# Patient Record
Sex: MALE | Race: BLACK OR AFRICAN AMERICAN | Employment: UNEMPLOYED | URBAN - METROPOLITAN AREA
[De-identification: names, ages, dates, MRNs, and addresses within clinical notes are randomized per-mention and may not be internally consistent; named-entity substitution may affect disease eponyms.]

---

## 2018-07-19 ENCOUNTER — HOSPITAL ENCOUNTER (EMERGENCY)
Age: 38
Discharge: HOME | End: 2018-07-19
Attending: EMERGENCY MEDICINE
Payer: COMMERCIAL

## 2018-07-19 VITALS
SYSTOLIC BLOOD PRESSURE: 132 MMHG | TEMPERATURE: 99.3 F | BODY MASS INDEX: 36.69 KG/M2 | OXYGEN SATURATION: 99 % | HEIGHT: 66 IN | DIASTOLIC BLOOD PRESSURE: 67 MMHG | WEIGHT: 228.3 LBS | HEART RATE: 90 BPM | RESPIRATION RATE: 16 BRPM

## 2018-07-19 DIAGNOSIS — K13.0 CELLULITIS OF LIP: Primary | ICD-10-CM

## 2018-07-19 PROCEDURE — 6360000002 HC RX W HCPCS: Performed by: NURSE PRACTITIONER

## 2018-07-19 PROCEDURE — 6370000000 HC RX 637 (ALT 250 FOR IP): Performed by: NURSE PRACTITIONER

## 2018-07-19 PROCEDURE — 96372 THER/PROPH/DIAG INJ SC/IM: CPT

## 2018-07-19 PROCEDURE — 99283 EMERGENCY DEPT VISIT LOW MDM: CPT

## 2018-07-19 RX ORDER — DIPHENHYDRAMINE HYDROCHLORIDE 50 MG/ML
50 INJECTION INTRAMUSCULAR; INTRAVENOUS ONCE
Status: COMPLETED | OUTPATIENT
Start: 2018-07-19 | End: 2018-07-19

## 2018-07-19 RX ORDER — CLINDAMYCIN HYDROCHLORIDE 300 MG/1
300 CAPSULE ORAL 3 TIMES DAILY
Qty: 21 CAPSULE | Refills: 0 | Status: SHIPPED | OUTPATIENT
Start: 2018-07-19 | End: 2018-07-26

## 2018-07-19 RX ORDER — FAMOTIDINE 20 MG/1
20 TABLET, FILM COATED ORAL ONCE
Status: COMPLETED | OUTPATIENT
Start: 2018-07-19 | End: 2018-07-19

## 2018-07-19 RX ORDER — DEXAMETHASONE SODIUM PHOSPHATE 10 MG/ML
10 INJECTION INTRAMUSCULAR; INTRAVENOUS ONCE
Status: COMPLETED | OUTPATIENT
Start: 2018-07-19 | End: 2018-07-19

## 2018-07-19 RX ADMIN — DEXAMETHASONE SODIUM PHOSPHATE 10 MG: 10 INJECTION INTRAMUSCULAR; INTRAVENOUS at 11:05

## 2018-07-19 RX ADMIN — DIPHENHYDRAMINE HYDROCHLORIDE 50 MG: 50 INJECTION, SOLUTION INTRAMUSCULAR; INTRAVENOUS at 11:04

## 2018-07-19 RX ADMIN — FAMOTIDINE 20 MG: 20 TABLET ORAL at 11:02

## 2018-07-19 ASSESSMENT — ENCOUNTER SYMPTOMS
CHEST TIGHTNESS: 0
SORE THROAT: 0
FACIAL SWELLING: 1
CHOKING: 0
NAUSEA: 0
TROUBLE SWALLOWING: 0
COUGH: 0
SHORTNESS OF BREATH: 0
VOMITING: 0

## 2018-07-19 ASSESSMENT — PAIN SCALES - GENERAL: PAINLEVEL_OUTOF10: 9

## 2018-07-19 ASSESSMENT — PAIN DESCRIPTION - LOCATION: LOCATION: FACE

## 2018-07-19 ASSESSMENT — PAIN DESCRIPTION - DESCRIPTORS: DESCRIPTORS: THROBBING;CONSTANT

## 2018-07-19 NOTE — ED NOTES
Patient ambulatory to treatment area. advised by patient that he experienced onset of swelling to upper lip two days ago. Denies any injury to swollen area and claims that the only medication he is taking is percocet. . States that upper lip swelling has decreased slightly since he has been taking benadryl . denies any other specific discomfort other than swelling to upper lip. Upon arrival patient remains alert/oriented . Respirations non labored. Denies any difficulty swallowing. Patient being evaluated by elise dillard cnp and medications being ordered.       Alexx Sykes RN  07/19/18 [id number]

## 2018-07-26 ENCOUNTER — HOSPITAL ENCOUNTER (EMERGENCY)
Age: 38
Discharge: HOME | End: 2018-07-26
Attending: EMERGENCY MEDICINE
Payer: COMMERCIAL

## 2018-07-26 VITALS
WEIGHT: 229.06 LBS | SYSTOLIC BLOOD PRESSURE: 130 MMHG | OXYGEN SATURATION: 100 % | DIASTOLIC BLOOD PRESSURE: 75 MMHG | HEART RATE: 74 BPM | BODY MASS INDEX: 36.81 KG/M2 | TEMPERATURE: 97.9 F | RESPIRATION RATE: 18 BRPM | HEIGHT: 66 IN

## 2018-07-26 DIAGNOSIS — R19.7 DIARRHEA, UNSPECIFIED TYPE: Primary | ICD-10-CM

## 2018-07-26 PROCEDURE — 6360000002 HC RX W HCPCS: Performed by: NURSE PRACTITIONER

## 2018-07-26 PROCEDURE — 99283 EMERGENCY DEPT VISIT LOW MDM: CPT

## 2018-07-26 RX ORDER — ONDANSETRON 4 MG/1
4 TABLET, ORALLY DISINTEGRATING ORAL EVERY 8 HOURS PRN
Qty: 20 TABLET | Refills: 0 | Status: SHIPPED | OUTPATIENT
Start: 2018-07-26 | End: 2018-09-16

## 2018-07-26 RX ORDER — ONDANSETRON 4 MG/1
4 TABLET, ORALLY DISINTEGRATING ORAL ONCE
Status: COMPLETED | OUTPATIENT
Start: 2018-07-26 | End: 2018-07-26

## 2018-07-26 RX ADMIN — ONDANSETRON 4 MG: 4 TABLET, ORALLY DISINTEGRATING ORAL at 09:53

## 2018-07-26 ASSESSMENT — ENCOUNTER SYMPTOMS
SINUS PRESSURE: 0
COUGH: 0
WHEEZING: 0
VOMITING: 0
COLOR CHANGE: 0
CONSTIPATION: 0
SORE THROAT: 0
NAUSEA: 0
RHINORRHEA: 0
DIARRHEA: 1
ABDOMINAL PAIN: 0
SHORTNESS OF BREATH: 0

## 2018-07-26 ASSESSMENT — PAIN DESCRIPTION - PAIN TYPE: TYPE: ACUTE PAIN

## 2018-07-26 ASSESSMENT — PAIN SCALES - GENERAL: PAINLEVEL_OUTOF10: 3

## 2018-07-26 NOTE — ED PROVIDER NOTES
The patient was seen and examined by me in conjunction with the mid-level provider. I agree with his/her assessment and treatment plan. The patient was unable to provide a stool specimen. Our intention was to test for C. difficile.      Dustin Terry MD  07/26/18 7191

## 2018-09-16 ENCOUNTER — HOSPITAL ENCOUNTER (EMERGENCY)
Age: 38
Discharge: HOME | End: 2018-09-16
Attending: EMERGENCY MEDICINE
Payer: COMMERCIAL

## 2018-09-16 ENCOUNTER — APPOINTMENT (OUTPATIENT)
Dept: GENERAL RADIOLOGY | Age: 38
End: 2018-09-16
Payer: COMMERCIAL

## 2018-09-16 VITALS
OXYGEN SATURATION: 100 % | WEIGHT: 228 LBS | HEIGHT: 66 IN | TEMPERATURE: 98.2 F | SYSTOLIC BLOOD PRESSURE: 134 MMHG | DIASTOLIC BLOOD PRESSURE: 63 MMHG | RESPIRATION RATE: 14 BRPM | HEART RATE: 58 BPM | BODY MASS INDEX: 36.64 KG/M2

## 2018-09-16 DIAGNOSIS — M25.572 CHRONIC PAIN OF LEFT ANKLE: Primary | ICD-10-CM

## 2018-09-16 DIAGNOSIS — G89.29 CHRONIC PAIN OF LEFT ANKLE: Primary | ICD-10-CM

## 2018-09-16 PROCEDURE — 99283 EMERGENCY DEPT VISIT LOW MDM: CPT

## 2018-09-16 PROCEDURE — 73610 X-RAY EXAM OF ANKLE: CPT

## 2018-09-16 ASSESSMENT — ENCOUNTER SYMPTOMS
ABDOMINAL PAIN: 0
SORE THROAT: 0
COUGH: 0
DIARRHEA: 0
VOMITING: 0
COLOR CHANGE: 0
WHEEZING: 0
CONSTIPATION: 0
SHORTNESS OF BREATH: 0
RHINORRHEA: 0
SINUS PRESSURE: 0
NAUSEA: 0

## 2018-09-16 ASSESSMENT — PAIN SCALES - GENERAL: PAINLEVEL_OUTOF10: 8

## 2018-09-16 NOTE — ED PROVIDER NOTES
Systems   Constitutional: Negative for chills, fever and unexpected weight change. HENT: Negative for congestion, rhinorrhea, sinus pressure and sore throat. Respiratory: Negative for cough, shortness of breath and wheezing. Cardiovascular: Negative for chest pain and palpitations. Gastrointestinal: Negative for abdominal pain, constipation, diarrhea, nausea and vomiting. Genitourinary: Negative for dysuria and hematuria. Musculoskeletal: Negative for arthralgias and myalgias. Skin: Negative for color change and rash. Neurological: Negative for dizziness, weakness and headaches. Hematological: Negative for adenopathy. Except as noted above the remainder of the review of systems was reviewed and negative. PHYSICAL EXAM    (up to 7 for level 4, 8 or more for level 5)     ED Triage Vitals [09/16/18 1006]   BP Temp Temp src Pulse Resp SpO2 Height Weight   134/63 98.2 °F (36.8 °C) -- 58 14 100 % 5' 6\" (1.676 m) 228 lb (103.4 kg)       Physical Exam   Constitutional: He is oriented to person, place, and time. He appears well-developed and well-nourished. HENT:   Head: Normocephalic and atraumatic. Mouth/Throat: Oropharynx is clear and moist.   Eyes: Pupils are equal, round, and reactive to light. Conjunctivae are normal.   Neck: Normal range of motion. Neck supple. Cardiovascular: Normal rate and regular rhythm. Pulmonary/Chest: Effort normal and breath sounds normal. No stridor. No respiratory distress. Abdominal: Soft. Bowel sounds are normal.   Musculoskeletal: Normal range of motion. Lymphadenopathy:     He has no cervical adenopathy. Neurological: He is alert and oriented to person, place, and time. Skin: Skin is warm and dry. No rash noted. Psychiatric: He has a normal mood and affect. Vitals reviewed.       RADIOLOGY:   Non-plain film images such as CT, Ultrasound and MRI are read by the radiologist. Plain radiographic images are visualized and preliminarily DEPARTMENT COURSE and DIFFERENTIAL DIAGNOSIS/MDM:   Vitals:    Vitals:    09/16/18 1006   BP: 134/63   Pulse: 58   Resp: 14   Temp: 98.2 °F (36.8 °C)   SpO2: 100%   Weight: 228 lb (103.4 kg)   Height: 5' 6\" (1.676 m)       Medical Decision Making: the patient was given 3 days off at work. He was made aware of the findings on xray. He will call Dr Enrique Farfan office in the morning for follow up care. FINAL IMPRESSION      1.  Chronic pain of left ankle          DISPOSITION/PLAN   DISPOSITION        PATIENT REFERRED TO:   Vidhya Monterroso MD  . [place] [address]  [phone]    Call in 2 days      [facility]  [address] [facility]  [phone]    If symptoms worsen      DISCHARGE MEDICATIONS:     Discharge Medication List as of 9/16/2018 10:40 AM              (Please note that portions of this note were completed with a voice recognition program.  Efforts were made to edit the dictations but occasionally words are mis-transcribed.)    [address] [place] [address] NP, APRN - [place]  Certified Nurse Practitioner          ALEXA Warner - STALIN  09/16/18 9523

## 2019-03-10 ENCOUNTER — HOSPITAL ENCOUNTER (EMERGENCY)
Age: 39
Discharge: HOME | End: 2019-03-10
Attending: EMERGENCY MEDICINE
Payer: COMMERCIAL

## 2019-03-10 ENCOUNTER — APPOINTMENT (OUTPATIENT)
Dept: GENERAL RADIOLOGY | Age: 39
End: 2019-03-10
Payer: COMMERCIAL

## 2019-03-10 VITALS
OXYGEN SATURATION: 100 % | SYSTOLIC BLOOD PRESSURE: 140 MMHG | DIASTOLIC BLOOD PRESSURE: 68 MMHG | TEMPERATURE: 98.1 F | HEIGHT: 66 IN | BODY MASS INDEX: 35.79 KG/M2 | WEIGHT: 222.7 LBS | HEART RATE: 61 BPM | RESPIRATION RATE: 16 BRPM

## 2019-03-10 DIAGNOSIS — M25.572 CHRONIC PAIN OF LEFT ANKLE: Primary | ICD-10-CM

## 2019-03-10 DIAGNOSIS — S93.402A SPRAIN OF LEFT ANKLE, UNSPECIFIED LIGAMENT, INITIAL ENCOUNTER: ICD-10-CM

## 2019-03-10 DIAGNOSIS — G89.29 CHRONIC PAIN OF LEFT ANKLE: Primary | ICD-10-CM

## 2019-03-10 PROCEDURE — 99283 EMERGENCY DEPT VISIT LOW MDM: CPT

## 2019-03-10 PROCEDURE — 73610 X-RAY EXAM OF ANKLE: CPT

## 2019-03-10 PROCEDURE — 73630 X-RAY EXAM OF FOOT: CPT

## 2019-03-10 ASSESSMENT — PAIN DESCRIPTION - LOCATION: LOCATION: ANKLE

## 2019-03-10 ASSESSMENT — PAIN DESCRIPTION - DESCRIPTORS: DESCRIPTORS: CONSTANT;SHARP;STABBING

## 2019-03-10 ASSESSMENT — PAIN DESCRIPTION - FREQUENCY: FREQUENCY: CONTINUOUS

## 2019-03-10 ASSESSMENT — PAIN SCALES - GENERAL: PAINLEVEL_OUTOF10: 9

## 2019-03-10 ASSESSMENT — PAIN DESCRIPTION - ORIENTATION: ORIENTATION: LEFT

## 2020-07-02 ENCOUNTER — TELEPHONE (OUTPATIENT)
Dept: ORTHOPEDIC SURGERY | Age: 40
End: 2020-07-02

## 2020-07-02 NOTE — TELEPHONE ENCOUNTER
[facility] office calling to see if patient needs clearance for upcoming surgery. MA/Surgery Scheduler, Denis Shelton said clearance is needed. [facility] stated an appointment will be scheduled for patient.

## 2020-07-15 ENCOUNTER — HOSPITAL ENCOUNTER (OUTPATIENT)
Age: 40
Setting detail: SPECIMEN
Discharge: HOME | End: 2020-07-15
Payer: COMMERCIAL

## 2020-07-15 ENCOUNTER — OFFICE VISIT (OUTPATIENT)
Dept: ORTHOPEDIC SURGERY | Age: 40
End: 2020-07-15
Payer: COMMERCIAL

## 2020-07-15 VITALS
DIASTOLIC BLOOD PRESSURE: 89 MMHG | HEIGHT: 66 IN | SYSTOLIC BLOOD PRESSURE: 132 MMHG | WEIGHT: 222 LBS | BODY MASS INDEX: 35.68 KG/M2 | HEART RATE: 98 BPM

## 2020-07-15 LAB
ABSOLUTE EOS #: 0.05 K/UL (ref 0–0.44)
ABSOLUTE IMMATURE GRANULOCYTE: <0.03 K/UL (ref 0–0.3)
ABSOLUTE LYMPH #: 1.48 K/UL (ref 1.1–3.7)
ABSOLUTE MONO #: 0.21 K/UL (ref 0.1–1.2)
ALBUMIN SERPL-MCNC: 4.6 G/DL (ref 3.5–5.2)
ALBUMIN/GLOBULIN RATIO: 1.5 (ref 1–2.5)
ALP BLD-CCNC: 61 U/L (ref 40–129)
ALT SERPL-CCNC: 26 U/L (ref 5–41)
ANION GAP SERPL CALCULATED.3IONS-SCNC: 15 MMOL/L (ref 9–17)
AST SERPL-CCNC: 23 U/L
BASOPHILS # BLD: 1 % (ref 0–2)
BASOPHILS ABSOLUTE: 0.05 K/UL (ref 0–0.2)
BILIRUB SERPL-MCNC: 0.37 MG/DL (ref 0.3–1.2)
BUN BLDV-MCNC: 10 MG/DL (ref 6–20)
BUN/CREAT BLD: ABNORMAL (ref 9–20)
C-REACTIVE PROTEIN: 1.2 MG/L (ref 0–5)
CALCIUM SERPL-MCNC: 9.4 MG/DL (ref 8.6–10.4)
CHLORIDE BLD-SCNC: 100 MMOL/L (ref 98–107)
CO2: 24 MMOL/L (ref 20–31)
CREAT SERPL-MCNC: 0.9 MG/DL (ref 0.7–1.2)
DIFFERENTIAL TYPE: NORMAL
EOSINOPHILS RELATIVE PERCENT: 1 % (ref 1–4)
GFR AFRICAN AMERICAN: >60 ML/MIN
GFR NON-AFRICAN AMERICAN: >60 ML/MIN
GFR SERPL CREATININE-BSD FRML MDRD: ABNORMAL ML/MIN/{1.73_M2}
GFR SERPL CREATININE-BSD FRML MDRD: ABNORMAL ML/MIN/{1.73_M2}
GLUCOSE BLD-MCNC: 134 MG/DL (ref 70–99)
HCT VFR BLD CALC: 47.2 % (ref 40.7–50.3)
HEMOGLOBIN: 14.5 G/DL (ref 13–17)
IMMATURE GRANULOCYTES: 0 %
LYMPHOCYTES # BLD: 33 % (ref 24–43)
MCH RBC QN AUTO: 26.3 PG (ref 25.2–33.5)
MCHC RBC AUTO-ENTMCNC: 30.7 G/DL (ref 28.4–34.8)
MCV RBC AUTO: 85.7 FL (ref 82.6–102.9)
MONOCYTES # BLD: 5 % (ref 3–12)
NRBC AUTOMATED: 0 PER 100 WBC
PDW BLD-RTO: 13.2 % (ref 11.8–14.4)
PLATELET # BLD: 300 K/UL (ref 138–453)
PLATELET ESTIMATE: NORMAL
PMV BLD AUTO: 11.6 FL (ref 8.1–13.5)
POTASSIUM SERPL-SCNC: 3.9 MMOL/L (ref 3.7–5.3)
PREALBUMIN: 26.8 MG/DL (ref 20–40)
PTH INTACT: 29.03 PG/ML (ref 15–65)
RBC # BLD: 5.51 M/UL (ref 4.21–5.77)
RBC # BLD: NORMAL 10*6/UL
SEDIMENTATION RATE, ERYTHROCYTE: 6 MM (ref 0–15)
SEG NEUTROPHILS: 60 % (ref 36–65)
SEGMENTED NEUTROPHILS ABSOLUTE COUNT: 2.68 K/UL (ref 1.5–8.1)
SODIUM BLD-SCNC: 139 MMOL/L (ref 135–144)
TOTAL PROTEIN: 7.6 G/DL (ref 6.4–8.3)
TSH SERPL DL<=0.05 MIU/L-ACNC: 1.63 MIU/L (ref 0.3–5)
VITAMIN D 25-HYDROXY: 17.8 NG/ML (ref 30–100)
WBC # BLD: 4.5 K/UL (ref 3.5–11.3)
WBC # BLD: NORMAL 10*3/UL

## 2020-07-15 PROCEDURE — 99204 OFFICE O/P NEW MOD 45 MIN: CPT | Performed by: ORTHOPAEDIC SURGERY
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
## 2020-07-15 NOTE — LETTER
Dr. Rai [address] [facility]  [phone]        7/15/2020     Patient: Jorge Phillips  YOB: 1980    Dear Dontae Perdomo MD,    I had the pleasure of seeing one of your patients, Jorge Phillips, recently in the office. Below are the relevant portions of my assessment and plan of care. ASSESSMENT AND PLAN:  He has left posttraumatic tibiotalar arthritis (status post surgery for ankle and syndesmosis fusion in April 2019 by Dr. Raquel East) with retained hardware, and questionable nonunion of his tibiotalar joint, along with valgus tilt of his ankle/hindfoot. Notably, he has no relevant past medical history. I had a long discussion today with the patient about the likely diagnosis and its natural history, physical exam and imaging findings, as well as treatment options in detail. We discussed rest/activity modification, swelling control, NSAIDs/Acetaminophen/topical anesthetics, orthotics/shoewear modification, bracing/immobilization, injections, and physical therapy. Surgically, we discussed revision tibiotalar arthrodesis versus hindfoot fusion with bone grafting, with removal of hardware, with soft tissue balancing as needed. We also discussed the possibility of staging the surgical treatment, beginning with a removal of hardware, and then returning to the operating room several weeks later for the fusion as above, depending on the results of his infectious lab work as below. At this point, as he does report that he is having increasing difficulty with ambulation, he does wish to proceed with surgery, and after our discussion of the anticipated postoperative course he does state he would like to proceed in September/October. I do think he would most likely benefit postoperatively from a bone stimulator as well. The patient wishes to proceed with the recommendations as above. Orders/referrals were placed as below at today's visit. The following labs were ordered to rule out a reversible metabolic cause of a nonunion: 25-OH vitamin D, CMP, TSH, PTH, and Prealbumin. The following labs were ordered to rule out an infection: CBC with differential, CRP, and ESR. All questions were answered and the patient agrees with the above plan. The patient will return to clinic in 1 month with bilateral Lorriane Mano view x-rays. At his next visit, he will bring with him a CD with his CT scan (was obtained in May 2020), and a copy of his most recent operative note. Depending on his infectious labs, if there is any suspicion for infection, then I may consider recommending staging his removal of hardware, in order to allow 14-day cultures to mature. Depending on the appearance of his CT scan (which is unable to be viewed at this visit), we will decide between a tibiotalar and a hindfoot fusion. Thank you for allowing me to participate in the care of this patient. I look forward to serving you and your patients again in the future. Please don't hesitate to contact me at my mobile number .         Demetris Mojica MD  Orthopedic Surgery

## 2020-07-20 ENCOUNTER — TELEPHONE (OUTPATIENT)
Dept: ORTHOPEDIC SURGERY | Age: 40
End: 2020-07-20

## 2020-07-24 ENCOUNTER — TELEPHONE (OUTPATIENT)
Dept: ORTHOPEDIC SURGERY | Age: 40
End: 2020-07-24

## 2020-08-05 ENCOUNTER — OFFICE VISIT (OUTPATIENT)
Dept: ORTHOPEDIC SURGERY | Age: 40
End: 2020-08-05
Payer: COMMERCIAL

## 2020-08-05 VITALS
BODY MASS INDEX: 38.45 KG/M2 | DIASTOLIC BLOOD PRESSURE: 84 MMHG | HEART RATE: 63 BPM | SYSTOLIC BLOOD PRESSURE: 131 MMHG | HEIGHT: 67 IN | TEMPERATURE: 97.6 F | WEIGHT: 245 LBS

## 2020-08-05 PROCEDURE — 99213 OFFICE O/P EST LOW 20 MIN: CPT | Performed by: ORTHOPAEDIC SURGERY

## 2020-08-05 RX ORDER — ERGOCALCIFEROL (VITAMIN D2) 1250 MCG
50000 CAPSULE ORAL WEEKLY
Qty: 8 CAPSULE | Refills: 0 | Status: SHIPPED | OUTPATIENT
Start: 2020-08-05 | End: 2020-10-05
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
## 2020-08-17 ENCOUNTER — OFFICE VISIT (OUTPATIENT)
Dept: FAMILY MEDICINE CLINIC | Age: 40
End: 2020-08-17
Payer: COMMERCIAL

## 2020-08-17 VITALS
TEMPERATURE: 99.1 F | HEART RATE: 72 BPM | SYSTOLIC BLOOD PRESSURE: 130 MMHG | DIASTOLIC BLOOD PRESSURE: 82 MMHG | OXYGEN SATURATION: 96 % | WEIGHT: 244 LBS | HEIGHT: 66 IN | BODY MASS INDEX: 39.21 KG/M2

## 2020-08-17 LAB — HBA1C MFR BLD: 5 %

## 2020-08-17 PROCEDURE — 99203 OFFICE O/P NEW LOW 30 MIN: CPT | Performed by: NURSE PRACTITIONER

## 2020-08-17 PROCEDURE — 83036 HEMOGLOBIN GLYCOSYLATED A1C: CPT | Performed by: NURSE PRACTITIONER

## 2020-08-17 ASSESSMENT — ENCOUNTER SYMPTOMS
NAUSEA: 0
COUGH: 0
CHEST TIGHTNESS: 0
ABDOMINAL DISTENTION: 0
VOMITING: 0
CONSTIPATION: 0
ABDOMINAL PAIN: 0
SHORTNESS OF BREATH: 0
SORE THROAT: 0
DIARRHEA: 0
BACK PAIN: 0
RHINORRHEA: 0

## 2020-08-17 ASSESSMENT — PATIENT HEALTH QUESTIONNAIRE - PHQ9
1. LITTLE INTEREST OR PLEASURE IN DOING THINGS: 0
SUM OF ALL RESPONSES TO PHQ9 QUESTIONS 1 & 2: 0
2. FEELING DOWN, DEPRESSED OR HOPELESS: 0
SUM OF ALL RESPONSES TO PHQ QUESTIONS 1-9: 0
SUM OF ALL RESPONSES TO PHQ QUESTIONS 1-9: 0
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
## 2020-08-17 NOTE — LETTER
Sridhar Lamer, APRN-CNP  [address] [place] [address]  [address] [address], [address]  [facility] [address]  Dept: [phone]  Dept Fax: [phone]                  Date: 2020     Provider: Dr Joni Rosario MD    Patient: Leslye Mast    : 1980    Procedure: Revision tibiotalar arthrodesis versus hindfoot fusion with bone grafting, with removal of hardware, with soft tissue balancing as needed. Patient will be low risk for cardiopulmonary complications. Patient is cleared for proposed surgery. Any questions please feel free to call at anytime.     Thank you,        DESIREE Silvestre CNP

## 2020-08-17 NOTE — PROGRESS NOTES
Visit Information    Have you changed or started any medications since your last visit including any over-the-counter medicines, vitamins, or herbal medicines? no   Are you having any side effects from any of your medications? -  no  Have you stopped taking any of your medications? Is so, why? -  no    Have you seen any other physician or provider since your last visit? Yes - Records Requested  Have you had any other diagnostic tests since your last visit? Yes - Records Obtained  Have you been seen in the emergency room and/or had an admission to a hospital since we last saw you? No  Have you had your routine dental cleaning in the past 6 months? no    Have you activated your [employer] account? If not, what are your barriers?  Yes     Patient Care Team:  Claire Aguirre MD as Consulting Physician (Orthopedic Surgery)    Medical History Review  Past Medical, Family, and Social History reviewed and does not contribute to the patient presenting condition    Health Maintenance   Topic Date Due    DTaP/Tdap/Td vaccine (1 - Tdap) 09/14/2000    HIV screen  08/17/2021 (Originally 9/14/1996)    Flu vaccine (1) 09/01/2020    Hepatitis A vaccine  Aged Out    Hepatitis B vaccine  Aged Out    Hib vaccine  Aged Out    Meningococcal (ACWY) vaccine  Aged Out    Pneumococcal 0-64 years Vaccine  Aged Out    Varicella vaccine  Discontinued

## 2020-08-18 ENCOUNTER — HOSPITAL ENCOUNTER (OUTPATIENT)
Dept: CT IMAGING | Age: 40
Discharge: HOME | End: 2020-08-20
Payer: COMMERCIAL

## 2020-08-18 PROCEDURE — 73700 CT LOWER EXTREMITY W/O DYE: CPT

## 2020-08-19 ENCOUNTER — OFFICE VISIT (OUTPATIENT)
Dept: ORTHOPEDIC SURGERY | Age: 40
End: 2020-08-19
Payer: COMMERCIAL

## 2020-08-19 VITALS
WEIGHT: 244 LBS | HEIGHT: 66 IN | HEART RATE: 59 BPM | SYSTOLIC BLOOD PRESSURE: 139 MMHG | DIASTOLIC BLOOD PRESSURE: 83 MMHG | BODY MASS INDEX: 39.21 KG/M2

## 2020-08-19 PROCEDURE — 99213 OFFICE O/P EST LOW 20 MIN: CPT | Performed by: ORTHOPAEDIC SURGERY

## 2020-08-19 RX ORDER — ACETAMINOPHEN 160 MG
1 TABLET,DISINTEGRATING ORAL DAILY
COMMUNITY
End: 2021-06-01 | Stop reason: ALTCHOICE

## 2020-08-19 NOTE — PROGRESS NOTES
time, and place  Cardio:  well perfused extremities  Resp:  normal respiratory effort  Skin:  no cyanosis  Hem/lymph:  no lymphedema  Neuro:  sensation to light touch grossly intact throughout all nerve distributions in the foot except diminished on the medial and dorsal aspect of the left foot  Musculoskeletal:    RLE:  Alignment:  Heel neutral   Vascular: Toes warm and well perfused, compartments soft/compressible. No significant swelling of foot. Skin: Intact without rash/lesions/AV malformations. Strength: Able to fire/perform the following with appropriate strength:    [x]  Tib Ant:     [x]  Gastroc-Soleus:         [x]  Inversion:    [x]  Eversion:         [x]  FHL:     [x]  EHL:      Motion:  Normal for the following joints:    [x]  Ankle:     [x]  Subtalar:        [x]  1st MTP:      []  1st TMT:            Tenderness to Palpation:    Tenderness to palpation: None      LLE:  Alignment:  Heel valgus, asymmetric to contralateral  Vascular: Toes warm and well perfused, compartments soft/compressible. No significant swelling of foot. Skin: Intact without rash/lesions/AV malformations. Healed incision at the lateral aspect of the ankle/hindfoot. Healed traumatic wound to the anteromedial aspect of the ankle. Strength: Able to fire/perform the following with appropriate strength:    [x]  Tib Ant:     [x]  Gastroc-Soleus:         [x]  Inversion:    [x]  Eversion:         [x]  FHL:     [x]  EHL:      Motion:  Normal for the following joints:    []  Ankle: No gross motion    []  Subtalar: Diminished      [x]  1st MTP:      []  1st TMT:            Tenderness to Palpation:    Tenderness to palpation:  anterior ankle joint line and diffusely throughout the lateral hindfoot      RADIOLOGY:   8/19/2020 Prior images reviewed for reference.  CT images and radiology report reviewed, as below:    Chronic postsurgical and posttraumatic change to the foot and ankle as above    with orthopedic hardware in place.  No evidence for hardware complication.         Minimal bony fusion of tibiotalar joint estimated at 10% or less with severe    degenerative changes to the unfused portions of the joint.         Mild degenerative changes to the subtalar joint.                 FINDINGS:  One weightbearing view Elkinsjaelyn Bernabee) of the bilateral ankle was obtained in the office today and reviewed, revealing no acute fracture, dislocation, or radioopaque foreign body/tumor. Asymmetric hindfoot valgus on the left. IMPRESSION:  No acute fracture/dislocation. Alignment as above. Electronically signed by Lashonda Haas MD         FINDINGS:  Three weightbearing views (AP, Mortise, and Lateral) of the left ankle and three weightbearing views (AP, Oblique, Lateral) of the left foot were obtained in the office today and reviewed, revealing no acute fracture, dislocation, or radioopaque foreign body/tumor. Degenerative changes of tibiotalar joint with joint narrowing, sclerosis, and osteophytes. Retained hardware noted across the tibiotalar joint and across the syndesmosis, with questionable amount of healing at the tibiotalar joint. IMPRESSION:  No acute fracture/dislocation. Hardware traversing the tibiotalar joint and syndesmosis as above.     Electronically signed by Lashonda Haas MD      LABS:   Lab Results   Component Value Date     07/15/2020    K 3.9 07/15/2020     07/15/2020    CO2 24 07/15/2020    BUN 10 07/15/2020    CREATININE 0.90 07/15/2020    GLUCOSE 134 (H) 07/15/2020    CALCIUM 9.4 07/15/2020    PROT 7.6 07/15/2020    LABALBU 4.6 07/15/2020    BILITOT 0.37 07/15/2020    ALKPHOS 61 07/15/2020    AST 23 07/15/2020    ALT 26 07/15/2020    LABGLOM >60 07/15/2020    GFRAA >60 07/15/2020     Lab Results   Component Value Date    TSH 1.63 07/15/2020     Lab Results   Component Value Date    CALCIUM 9.4 07/15/2020     Lab Results   Component Value Date    LABALBU 4.6 07/15/2020      Lab Results   Component Value Date VITD25 17.8 (L) 07/15/2020     Prealbumin 26.8    LABS:    Lab Results   Component Value Date    WBC 4.5 07/15/2020     Lab Results   Component Value Date    CRP 1.2 07/15/2020     Lab Results   Component Value Date    SEDRATE 6 07/15/2020       ASSESSMENT AND PLAN:  Chad Camacho was seen today for Ankle Pain (left ankle)  The primary encounter diagnosis was Retained orthopedic hardware. 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 Body mass index is 39.38 kg/m². He has left posttraumatic tibiotalar arthritis (status post surgery for ankle and syndesmosis fusion in April 2019 by Dr. 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 His infectious labs as above are all within normal limits, and the only abnormal lab from a nonunion laboratory work-up as above was low vitamin D (for which I prescribed him vitamin D supplementation). Notably, he has no relevant past medical history, other than vitamin D deficiency. I had another discussion today with the patient about the likely diagnosis and its natural history, physical exam and imaging findings, as well as treatment options in detail. We discussed rest/activity modification, swelling control, NSAIDs/Acetaminophen/topical anesthetics, orthotics/shoewear modification, bracing/immobilization, injections, and physical therapy. Surgically, we discussed staged procedures. For the first stage, I will remove all of his hardware, with possible irrigation debridement, and take deep cultures. If his cultures return as negative after 14 days, we will plan to return to the operating room for a revision tibiotalar arthrodesis with bone grafting (allograft and possible proximal tibial bone graft), possible VON, and possible calcaneal osteotomy.   He does wish to proceed with surgery as planned in September and October. Postoperatively, he will use a bone stimulator (he has obtained this), continuous vitamin D supplementation, and I will most likely keep him nonweightbearing for 3 months. The patient wishes to proceed with the recommendations as above. Orders/referrals were placed as below at today's visit. All questions were answered and the patient agrees with the above plan. The patient will return to clinic preoperatively with repeat left foot and ankle x-rays. At his next visit, he will bring a copy of his most recent operative note for preoperative planning in order to remove the hardware. No follow-ups on file. No orders of the defined types were placed in this encounter. No orders of the defined types were placed in this encounter. Susanna Aguilar MD  Orthopedic Surgery, Foot and Ankle        Please excuse any typos/errors, as this note was created with the assistance of voice recognition software. While intending to generate a document that actually reflects the content of the visit, the document can still have some errors including those of syntax and sound-a-like substitutions which may escape proof reading. In such instances, actual meaning can be extrapolated by context.

## 2020-09-04 ENCOUNTER — TELEPHONE (OUTPATIENT)
Dept: ORTHOPEDIC SURGERY | Age: 40
End: 2020-09-04

## 2020-09-11 ENCOUNTER — HOSPITAL ENCOUNTER (OUTPATIENT)
Dept: PREADMISSION TESTING | Age: 40
Discharge: HOME | End: 2020-09-15
Payer: COMMERCIAL

## 2020-09-11 VITALS
TEMPERATURE: 97.6 F | BODY MASS INDEX: 40.04 KG/M2 | HEART RATE: 70 BPM | HEIGHT: 66 IN | WEIGHT: 249.12 LBS | OXYGEN SATURATION: 100 % | DIASTOLIC BLOOD PRESSURE: 70 MMHG | RESPIRATION RATE: 16 BRPM | SYSTOLIC BLOOD PRESSURE: 132 MMHG

## 2020-09-11 LAB
ANION GAP SERPL CALCULATED.3IONS-SCNC: 8 MMOL/L (ref 9–17)
BUN BLDV-MCNC: 12 MG/DL (ref 6–20)
BUN/CREAT BLD: 13 (ref 9–20)
CALCIUM SERPL-MCNC: 8.9 MG/DL (ref 8.6–10.4)
CHLORIDE BLD-SCNC: 104 MMOL/L (ref 98–107)
CO2: 28 MMOL/L (ref 20–31)
CREAT SERPL-MCNC: 0.95 MG/DL (ref 0.7–1.2)
GFR AFRICAN AMERICAN: >60 ML/MIN
GFR NON-AFRICAN AMERICAN: >60 ML/MIN
GFR SERPL CREATININE-BSD FRML MDRD: ABNORMAL ML/MIN/{1.73_M2}
GFR SERPL CREATININE-BSD FRML MDRD: ABNORMAL ML/MIN/{1.73_M2}
GLUCOSE BLD-MCNC: 98 MG/DL (ref 70–99)
HCT VFR BLD CALC: 44.1 % (ref 40.7–50.3)
HEMOGLOBIN: 13.6 G/DL (ref 13–17)
MCH RBC QN AUTO: 26.2 PG (ref 25.2–33.5)
MCHC RBC AUTO-ENTMCNC: 30.8 G/DL (ref 28.4–34.8)
MCV RBC AUTO: 85 FL (ref 82.6–102.9)
NRBC AUTOMATED: 0 PER 100 WBC
PDW BLD-RTO: 13.1 % (ref 11.8–14.4)
PLATELET # BLD: 224 K/UL (ref 138–453)
PMV BLD AUTO: 10.4 FL (ref 8.1–13.5)
POTASSIUM SERPL-SCNC: 3.8 MMOL/L (ref 3.7–5.3)
RBC # BLD: 5.19 M/UL (ref 4.21–5.77)
SODIUM BLD-SCNC: 140 MMOL/L (ref 135–144)
WBC # BLD: 3.3 K/UL (ref 3.5–11.3)

## 2020-09-11 PROCEDURE — 85027 COMPLETE CBC AUTOMATED: CPT

## 2020-09-11 PROCEDURE — 93005 ELECTROCARDIOGRAM TRACING: CPT | Performed by: ANESTHESIOLOGY

## 2020-09-11 PROCEDURE — 80048 BASIC METABOLIC PNL TOTAL CA: CPT

## 2020-09-11 PROCEDURE — 36415 COLL VENOUS BLD VENIPUNCTURE: CPT

## 2020-09-11 RX ORDER — ALPRAZOLAM 1 MG/1
1 TABLET ORAL DAILY PRN
COMMUNITY
End: 2021-04-30 | Stop reason: ALTCHOICE

## 2020-09-11 RX ORDER — ACETAMINOPHEN 500 MG
1000 TABLET ORAL ONCE
Status: CANCELLED | OUTPATIENT
Start: 2020-09-25

## 2020-09-11 ASSESSMENT — PAIN DESCRIPTION - PROGRESSION: CLINICAL_PROGRESSION: GRADUALLY WORSENING

## 2020-09-11 ASSESSMENT — PAIN DESCRIPTION - FREQUENCY: FREQUENCY: CONTINUOUS

## 2020-09-11 ASSESSMENT — PAIN DESCRIPTION - ONSET: ONSET: ON-GOING

## 2020-09-11 ASSESSMENT — PAIN DESCRIPTION - ORIENTATION: ORIENTATION: LEFT

## 2020-09-11 ASSESSMENT — PAIN DESCRIPTION - LOCATION: LOCATION: ANKLE

## 2020-09-11 ASSESSMENT — PAIN SCALES - GENERAL: PAINLEVEL_OUTOF10: 9

## 2020-09-11 ASSESSMENT — PAIN DESCRIPTION - PAIN TYPE: TYPE: CHRONIC PAIN

## 2020-09-11 ASSESSMENT — PAIN DESCRIPTION - DESCRIPTORS: DESCRIPTORS: ACHING;CONSTANT

## 2020-09-11 ASSESSMENT — PAIN - FUNCTIONAL ASSESSMENT: PAIN_FUNCTIONAL_ASSESSMENT: PREVENTS OR INTERFERES SOME ACTIVE ACTIVITIES AND ADLS
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
## 2020-09-11 NOTE — PRE-PROCEDURE INSTRUCTIONS
[facility] ON Friday, September 25,2020 at 07:30 AM    Call [phone] when you arrive to the hospital.    No visitors in surgery at this time      Continue to take your home medications as you normally do up to and including the night before surgery with the exception of any blood thinning medications. Please stop any blood thinning medications as directed by your surgeon or prescribing physician. Failure to stop certain medications may interfere with your scheduled surgery. These may include:  Aspirin, Warfarin (Coumadin), Clopidogrel (Plavix), Ibuprofen (Motrin, Advil), Naproxen (Aleve), Meloxicam (Mobic), Celecoxib (Celebrex), Eliquis, Pradaxa, Xarelto, Effient, Fish Oil, Herbal supplements. Please take the following medication(s) the day of surgery with a small sip of water:        PREPARING FOR YOUR SURGERY:     Before surgery, you can play an important role in your own health. Because skin is not sterile, we need to be sure that your skin is as free of germs as possible before surgery by carefully washing before surgery. Preparing or prepping skin before surgery can reduce the risk of a surgical site infection.   Do not shave the area of your body where your surgery will be performed unless you received specific permission from your physician. You will need to shower at home the night before surgery and the morning of surgery with a special soap called chlorhexidine gluconate (CHG*). *Not to be used by people allergic to Chlorhexidine Gluconate (CHG). Following these instructions will help you be sure that your skin is clean before surgery. Instructions on cleaning your skin before surgery: The night before your surgery:      You will need to shower with warm water (not hot) and the CHG soap.  Use a clean wash cloth and a clean towel. Have clean clothes available to put on after the shower.   First wash your hair with regular shampoo.   Rinse your hair and body thoroughly to remove the shampoo.  Wash your face and genital area (private parts) with your regular soap or water only. Thoroughly rinse your body with warm water from the neck down.  Turn water off to prevent rinsing the soap off too soon.  With a clean wet washcloth and half of the CHG soap in the bottle, lather your entire body from the neck down. Do not use CHG soap near your eyes or ears to avoid injury to those areas.  Wash thoroughly, paying special attention to the area where your surgery will be performed.  Wash your body gently for five (5) minutes. Avoid scrubbing your skin too hard.  Turn the water back on and rinse your body thoroughly.  Pat yourself dry with a clean, soft towel. Do not apply lotion, cream or powder.  Dress with clean freshly washed clothes. The morning of surgery:     Repeat shower following steps above - using remaining half of CHG soap in bottle. Patient Instructions:    Eyvonne Clonts If you are having any type of anesthesia you are to have nothing to eat or drink after midnight the night before your surgery. This includes gum, hard candy, mints, water or smoking or chewing tobacco.  The only exception to this is a small sip of water to take with any morning dose of heart, blood pressure, or seizure medications. No alcoholic beverages for 24 hours prior to surgery.  Brush your teeth but do not swallow water.  Bring your eyeglasses and case with you. No contacts are to be worn the day of surgery. You also may bring your hearing aids. Most surgical procedures involving anesthesia will require that you remove your dentures prior to surgery. · Do not wear any jewelry or body piercings day of surgery. Also, NO lotion, perfume or deodorant to be used the day of surgery.   No nail polish on the operative extremity (arm/leg surgeries)    · If you are staying overnight with us, please bring a small bag of necessary personal items.     Please wear loose, comfortable clothing. If you are potentially going to have a cast or brace bring clothing that will fit over them.  In case of illness - If you have cold or flu like symptoms (high fever, runny nose, sore throat, cough, etc.) rash, nausea, vomiting, loose stools, and/or recent contact with someone who has a contagious disease (chicken pox, measles, etc.) Please call your doctor before coming to the hospital.         Day of Surgery/Procedure:    As a patient at [facility] you can expect quality medical and nursing care that is centered on your individual needs. Our goal is to make your surgical experience as comfortable as possible    . Transportation After Your Surgery/Procedure: You will need a friend or family member to drive you home after your procedure. Your  must be 25years of age or older and able to sign off on your discharge instructions. A taxi cab or any other form of public transportation is not acceptable. Your friend or family member must stay at the hospital throughout your procedure. Someone must remain with you for the first 24 hours after your surgery if you receive anesthesia or medication. If you do not have someone to stay with you, your procedure may be cancelled.       If you have any other questions regarding your procedure or the day of surgery, please call [phone]      _________________________  ____________________________  Signature (Patient)              Signature (Provider) & date
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
## 2020-09-12 LAB
EKG ATRIAL RATE: 69 BPM
EKG P AXIS: 50 DEGREES
EKG P-R INTERVAL: 206 MS
EKG Q-T INTERVAL: 404 MS
EKG QRS DURATION: 94 MS
EKG QTC CALCULATION (BAZETT): 432 MS
EKG R AXIS: 23 DEGREES
EKG T AXIS: -2 DEGREES
EKG VENTRICULAR RATE: 69 BPM

## 2020-09-14 ENCOUNTER — TELEPHONE (OUTPATIENT)
Dept: ORTHOPEDIC SURGERY | Age: 40
End: 2020-09-14

## 2020-09-15 ENCOUNTER — TELEPHONE (OUTPATIENT)
Dept: ORTHOPEDIC SURGERY | Age: 40
End: 2020-09-15

## 2020-09-21 ENCOUNTER — HOSPITAL ENCOUNTER (OUTPATIENT)
Dept: PREADMISSION TESTING | Age: 40
Setting detail: SPECIMEN
Discharge: HOME | End: 2020-09-25
Payer: COMMERCIAL

## 2020-09-21 PROCEDURE — U0003 INFECTIOUS AGENT DETECTION BY NUCLEIC ACID (DNA OR RNA); SEVERE ACUTE RESPIRATORY SYNDROME CORONAVIRUS 2 (SARS-COV-2) (CORONAVIRUS DISEASE [COVID-19]), AMPLIFIED PROBE TECHNIQUE, MAKING USE OF HIGH THROUGHPUT TECHNOLOGIES AS DESCRIBED BY CMS-2020-01-R: HCPCS

## 2020-09-22 LAB — SARS-COV-2, NAA: NOT DETECTED

## 2020-09-23 ENCOUNTER — TELEPHONE (OUTPATIENT)
Dept: PRIMARY CARE CLINIC | Age: 40
End: 2020-09-23

## 2020-09-23 ENCOUNTER — OFFICE VISIT (OUTPATIENT)
Dept: ORTHOPEDIC SURGERY | Age: 40
End: 2020-09-23
Payer: COMMERCIAL

## 2020-09-23 VITALS — HEIGHT: 66 IN | WEIGHT: 249 LBS | BODY MASS INDEX: 40.02 KG/M2 | RESPIRATION RATE: 12 BRPM | TEMPERATURE: 97.4 F

## 2020-09-23 PROCEDURE — 99213 OFFICE O/P EST LOW 20 MIN: CPT | Performed by: ORTHOPAEDIC SURGERY
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
## 2020-09-24 ENCOUNTER — ANESTHESIA EVENT (OUTPATIENT)
Dept: OPERATING ROOM | Age: 40
End: 2020-09-24
Payer: COMMERCIAL

## 2020-09-25 ENCOUNTER — HOSPITAL ENCOUNTER (OUTPATIENT)
Age: 40
Setting detail: OUTPATIENT SURGERY
Discharge: HOME HEALTH | End: 2020-09-25
Attending: ORTHOPAEDIC SURGERY | Admitting: ORTHOPAEDIC SURGERY
Payer: COMMERCIAL

## 2020-09-25 ENCOUNTER — APPOINTMENT (OUTPATIENT)
Dept: GENERAL RADIOLOGY | Age: 40
End: 2020-09-25
Attending: ORTHOPAEDIC SURGERY
Payer: COMMERCIAL

## 2020-09-25 ENCOUNTER — ANESTHESIA (OUTPATIENT)
Dept: OPERATING ROOM | Age: 40
End: 2020-09-25
Payer: COMMERCIAL

## 2020-09-25 VITALS
WEIGHT: 249.12 LBS | OXYGEN SATURATION: 97 % | RESPIRATION RATE: 17 BRPM | HEART RATE: 77 BPM | HEIGHT: 66 IN | TEMPERATURE: 96.8 F | DIASTOLIC BLOOD PRESSURE: 62 MMHG | SYSTOLIC BLOOD PRESSURE: 118 MMHG | BODY MASS INDEX: 40.04 KG/M2

## 2020-09-25 VITALS — OXYGEN SATURATION: 100 % | DIASTOLIC BLOOD PRESSURE: 48 MMHG | SYSTOLIC BLOOD PRESSURE: 98 MMHG | TEMPERATURE: 98.6 F

## 2020-09-25 PROCEDURE — 6370000000 HC RX 637 (ALT 250 FOR IP): Performed by: ANESTHESIOLOGY

## 2020-09-25 PROCEDURE — 2500000003 HC RX 250 WO HCPCS: Performed by: NURSE ANESTHETIST, CERTIFIED REGISTERED

## 2020-09-25 PROCEDURE — 20680 REMOVAL OF IMPLANT DEEP: CPT | Performed by: ORTHOPAEDIC SURGERY

## 2020-09-25 PROCEDURE — 3209999900 FLUORO FOR SURGICAL PROCEDURES

## 2020-09-25 PROCEDURE — 6360000002 HC RX W HCPCS: Performed by: ANESTHESIOLOGY

## 2020-09-25 PROCEDURE — 6360000002 HC RX W HCPCS: Performed by: NURSE ANESTHETIST, CERTIFIED REGISTERED

## 2020-09-25 PROCEDURE — 87070 CULTURE OTHR SPECIMN AEROBIC: CPT

## 2020-09-25 PROCEDURE — 2580000003 HC RX 258: Performed by: ANESTHESIOLOGY

## 2020-09-25 PROCEDURE — 7100000010 HC PHASE II RECOVERY - FIRST 15 MIN: Performed by: ORTHOPAEDIC SURGERY

## 2020-09-25 PROCEDURE — 3700000000 HC ANESTHESIA ATTENDED CARE: Performed by: ORTHOPAEDIC SURGERY

## 2020-09-25 PROCEDURE — 3700000001 HC ADD 15 MINUTES (ANESTHESIA): Performed by: ORTHOPAEDIC SURGERY

## 2020-09-25 PROCEDURE — 2500000003 HC RX 250 WO HCPCS: Performed by: ANESTHESIOLOGY

## 2020-09-25 PROCEDURE — 73600 X-RAY EXAM OF ANKLE: CPT

## 2020-09-25 PROCEDURE — 87075 CULTR BACTERIA EXCEPT BLOOD: CPT

## 2020-09-25 PROCEDURE — 2709999900 HC NON-CHARGEABLE SUPPLY: Performed by: ORTHOPAEDIC SURGERY

## 2020-09-25 PROCEDURE — 87205 SMEAR GRAM STAIN: CPT

## 2020-09-25 PROCEDURE — 3600000012 HC SURGERY LEVEL 2 ADDTL 15MIN: Performed by: ORTHOPAEDIC SURGERY

## 2020-09-25 PROCEDURE — 7100000011 HC PHASE II RECOVERY - ADDTL 15 MIN: Performed by: ORTHOPAEDIC SURGERY

## 2020-09-25 PROCEDURE — 64445 NJX AA&/STRD SCIATIC NRV IMG: CPT | Performed by: ANESTHESIOLOGY

## 2020-09-25 PROCEDURE — 7100000000 HC PACU RECOVERY - FIRST 15 MIN: Performed by: ORTHOPAEDIC SURGERY

## 2020-09-25 PROCEDURE — 3600000002 HC SURGERY LEVEL 2 BASE: Performed by: ORTHOPAEDIC SURGERY

## 2020-09-25 PROCEDURE — 7100000001 HC PACU RECOVERY - ADDTL 15 MIN: Performed by: ORTHOPAEDIC SURGERY

## 2020-09-25 RX ORDER — BUPIVACAINE HYDROCHLORIDE 5 MG/ML
INJECTION, SOLUTION EPIDURAL; INTRACAUDAL
Status: COMPLETED | OUTPATIENT
Start: 2020-09-25 | End: 2020-09-25

## 2020-09-25 RX ORDER — ONDANSETRON 4 MG/1
4 TABLET, FILM COATED ORAL EVERY 8 HOURS PRN
Qty: 20 TABLET | Refills: 0 | Status: SHIPPED | OUTPATIENT
Start: 2020-09-25 | End: 2020-10-02

## 2020-09-25 RX ORDER — OXYCODONE HYDROCHLORIDE AND ACETAMINOPHEN 5; 325 MG/1; MG/1
1 TABLET ORAL EVERY 6 HOURS PRN
Qty: 20 TABLET | Refills: 0 | Status: SHIPPED | OUTPATIENT
Start: 2020-09-25 | End: 2020-10-02

## 2020-09-25 RX ORDER — PROPOFOL 10 MG/ML
INJECTION, EMULSION INTRAVENOUS PRN
Status: DISCONTINUED | OUTPATIENT
Start: 2020-09-25 | End: 2020-09-25 | Stop reason: SDUPTHER

## 2020-09-25 RX ORDER — MIDAZOLAM HYDROCHLORIDE 1 MG/ML
2 INJECTION INTRAMUSCULAR; INTRAVENOUS ONCE
Status: COMPLETED | OUTPATIENT
Start: 2020-09-25 | End: 2020-09-25

## 2020-09-25 RX ORDER — ONDANSETRON 2 MG/ML
4 INJECTION INTRAMUSCULAR; INTRAVENOUS
Status: DISCONTINUED | OUTPATIENT
Start: 2020-09-25 | End: 2020-09-25 | Stop reason: HOSPADM

## 2020-09-25 RX ORDER — FENTANYL CITRATE 50 UG/ML
INJECTION, SOLUTION INTRAMUSCULAR; INTRAVENOUS PRN
Status: DISCONTINUED | OUTPATIENT
Start: 2020-09-25 | End: 2020-09-25 | Stop reason: SDUPTHER

## 2020-09-25 RX ORDER — CEFAZOLIN SODIUM 1 G/3ML
INJECTION, POWDER, FOR SOLUTION INTRAMUSCULAR; INTRAVENOUS PRN
Status: DISCONTINUED | OUTPATIENT
Start: 2020-09-25 | End: 2020-09-25 | Stop reason: SDUPTHER

## 2020-09-25 RX ORDER — CEFAZOLIN SODIUM 2 G/50ML
2 SOLUTION INTRAVENOUS ONCE
Status: DISCONTINUED | OUTPATIENT
Start: 2020-09-25 | End: 2020-09-25

## 2020-09-25 RX ORDER — OXYCODONE HYDROCHLORIDE AND ACETAMINOPHEN 5; 325 MG/1; MG/1
1 TABLET ORAL
Status: COMPLETED | OUTPATIENT
Start: 2020-09-25 | End: 2020-09-25

## 2020-09-25 RX ORDER — DOCUSATE SODIUM 100 MG/1
100 CAPSULE, LIQUID FILLED ORAL 2 TIMES DAILY PRN
Qty: 20 CAPSULE | Refills: 0 | Status: SHIPPED | OUTPATIENT
Start: 2020-09-25 | End: 2020-10-02

## 2020-09-25 RX ORDER — GLYCOPYRROLATE 1 MG/5 ML
SYRINGE (ML) INTRAVENOUS PRN
Status: DISCONTINUED | OUTPATIENT
Start: 2020-09-25 | End: 2020-09-25 | Stop reason: SDUPTHER

## 2020-09-25 RX ORDER — ROCURONIUM BROMIDE 10 MG/ML
INJECTION, SOLUTION INTRAVENOUS PRN
Status: DISCONTINUED | OUTPATIENT
Start: 2020-09-25 | End: 2020-09-25 | Stop reason: SDUPTHER

## 2020-09-25 RX ORDER — FENTANYL CITRATE 50 UG/ML
50 INJECTION, SOLUTION INTRAMUSCULAR; INTRAVENOUS EVERY 5 MIN PRN
Status: DISCONTINUED | OUTPATIENT
Start: 2020-09-25 | End: 2020-09-25 | Stop reason: HOSPADM

## 2020-09-25 RX ORDER — LIDOCAINE HYDROCHLORIDE 10 MG/ML
INJECTION, SOLUTION INFILTRATION; PERINEURAL
Status: COMPLETED | OUTPATIENT
Start: 2020-09-25 | End: 2020-09-25

## 2020-09-25 RX ORDER — ONDANSETRON 2 MG/ML
INJECTION INTRAMUSCULAR; INTRAVENOUS PRN
Status: DISCONTINUED | OUTPATIENT
Start: 2020-09-25 | End: 2020-09-25 | Stop reason: SDUPTHER

## 2020-09-25 RX ORDER — MIDAZOLAM HYDROCHLORIDE 1 MG/ML
INJECTION INTRAMUSCULAR; INTRAVENOUS PRN
Status: DISCONTINUED | OUTPATIENT
Start: 2020-09-25 | End: 2020-09-25 | Stop reason: SDUPTHER

## 2020-09-25 RX ORDER — ACETAMINOPHEN 500 MG
1000 TABLET ORAL ONCE
Status: COMPLETED | OUTPATIENT
Start: 2020-09-25 | End: 2020-09-25

## 2020-09-25 RX ORDER — FENTANYL CITRATE 50 UG/ML
25 INJECTION, SOLUTION INTRAMUSCULAR; INTRAVENOUS EVERY 5 MIN PRN
Status: DISCONTINUED | OUTPATIENT
Start: 2020-09-25 | End: 2020-09-25 | Stop reason: HOSPADM

## 2020-09-25 RX ORDER — SODIUM CHLORIDE, SODIUM LACTATE, POTASSIUM CHLORIDE, CALCIUM CHLORIDE 600; 310; 30; 20 MG/100ML; MG/100ML; MG/100ML; MG/100ML
INJECTION, SOLUTION INTRAVENOUS CONTINUOUS
Status: DISCONTINUED | OUTPATIENT
Start: 2020-09-25 | End: 2020-09-25 | Stop reason: HOSPADM

## 2020-09-25 RX ORDER — ASPIRIN 325 MG
325 TABLET, DELAYED RELEASE (ENTERIC COATED) ORAL DAILY
Qty: 42 TABLET | Refills: 0 | Status: ON HOLD
Start: 2020-09-25 | End: 2020-10-15 | Stop reason: HOSPADM

## 2020-09-25 RX ORDER — DEXAMETHASONE SODIUM PHOSPHATE 10 MG/ML
INJECTION, SOLUTION INTRAMUSCULAR; INTRAVENOUS PRN
Status: DISCONTINUED | OUTPATIENT
Start: 2020-09-25 | End: 2020-09-25 | Stop reason: SDUPTHER

## 2020-09-25 RX ORDER — LIDOCAINE HYDROCHLORIDE 20 MG/ML
INJECTION, SOLUTION EPIDURAL; INFILTRATION; INTRACAUDAL; PERINEURAL PRN
Status: DISCONTINUED | OUTPATIENT
Start: 2020-09-25 | End: 2020-09-25 | Stop reason: SDUPTHER

## 2020-09-25 RX ORDER — SULFAMETHOXAZOLE AND TRIMETHOPRIM 800; 160 MG/1; MG/1
1 TABLET ORAL 2 TIMES DAILY
Qty: 10 TABLET | Refills: 0 | Status: SHIPPED | OUTPATIENT
Start: 2020-09-25 | End: 2020-09-30

## 2020-09-25 RX ADMIN — Medication 0.4 MG: at 17:57

## 2020-09-25 RX ADMIN — ROCURONIUM BROMIDE 40 MG: 10 INJECTION, SOLUTION INTRAVENOUS at 15:54

## 2020-09-25 RX ADMIN — Medication 50 MCG: at 18:10

## 2020-09-25 RX ADMIN — SODIUM CHLORIDE, POTASSIUM CHLORIDE, SODIUM LACTATE AND CALCIUM CHLORIDE: 600; 310; 30; 20 INJECTION, SOLUTION INTRAVENOUS at 16:10

## 2020-09-25 RX ADMIN — ACETAMINOPHEN 1000 MG: 500 TABLET ORAL at 11:04

## 2020-09-25 RX ADMIN — MIDAZOLAM 2 MG: 1 INJECTION INTRAMUSCULAR; INTRAVENOUS at 15:48

## 2020-09-25 RX ADMIN — DEXAMETHASONE SODIUM PHOSPHATE 10 MG: 10 INJECTION INTRAMUSCULAR; INTRAVENOUS at 16:01

## 2020-09-25 RX ADMIN — ONDANSETRON 4 MG: 2 INJECTION, SOLUTION INTRAMUSCULAR; INTRAVENOUS at 16:30

## 2020-09-25 RX ADMIN — PROPOFOL 200 MG: 10 INJECTION, EMULSION INTRAVENOUS at 15:54

## 2020-09-25 RX ADMIN — Medication 100 MCG: at 15:53

## 2020-09-25 RX ADMIN — Medication 50 MCG: at 18:16

## 2020-09-25 RX ADMIN — NEOSTIGMINE METHYLSULFATE 3 MG: 1 INJECTION, SOLUTION INTRAMUSCULAR; INTRAVENOUS; SUBCUTANEOUS at 17:57

## 2020-09-25 RX ADMIN — BUPIVACAINE HYDROCHLORIDE 45 ML: 5 INJECTION, SOLUTION EPIDURAL; INTRACAUDAL; PERINEURAL at 12:53

## 2020-09-25 RX ADMIN — MIDAZOLAM 2 MG: 1 INJECTION INTRAMUSCULAR; INTRAVENOUS at 12:42

## 2020-09-25 RX ADMIN — Medication 50 MCG: at 18:01

## 2020-09-25 RX ADMIN — LIDOCAINE HYDROCHLORIDE 5 ML: 10 INJECTION, SOLUTION INFILTRATION; PERINEURAL at 12:53

## 2020-09-25 RX ADMIN — CEFAZOLIN 2000 MG: 1 INJECTION, POWDER, FOR SOLUTION INTRAMUSCULAR; INTRAVENOUS at 16:43

## 2020-09-25 RX ADMIN — LIDOCAINE HYDROCHLORIDE 100 MG: 20 INJECTION, SOLUTION EPIDURAL; INFILTRATION; INTRACAUDAL; PERINEURAL at 15:54

## 2020-09-25 RX ADMIN — OXYCODONE HYDROCHLORIDE AND ACETAMINOPHEN 1 TABLET: 5; 325 TABLET ORAL at 19:09

## 2020-09-25 RX ADMIN — Medication 50 MCG: at 18:02

## 2020-09-25 RX ADMIN — SODIUM CHLORIDE, POTASSIUM CHLORIDE, SODIUM LACTATE AND CALCIUM CHLORIDE: 600; 310; 30; 20 INJECTION, SOLUTION INTRAVENOUS at 08:13

## 2020-09-25 ASSESSMENT — PULMONARY FUNCTION TESTS
PIF_VALUE: 15
PIF_VALUE: 23
PIF_VALUE: 25
PIF_VALUE: 25
PIF_VALUE: 23
PIF_VALUE: 23
PIF_VALUE: 24
PIF_VALUE: 2
PIF_VALUE: 16
PIF_VALUE: 23
PIF_VALUE: 24
PIF_VALUE: 4
PIF_VALUE: 23
PIF_VALUE: 24
PIF_VALUE: 23
PIF_VALUE: 24
PIF_VALUE: 24
PIF_VALUE: 4
PIF_VALUE: 23
PIF_VALUE: 1
PIF_VALUE: 24
PIF_VALUE: 23
PIF_VALUE: 20
PIF_VALUE: 24
PIF_VALUE: 16
PIF_VALUE: 25
PIF_VALUE: 24
PIF_VALUE: 23
PIF_VALUE: 21
PIF_VALUE: 24
PIF_VALUE: 25
PIF_VALUE: 15
PIF_VALUE: 24
PIF_VALUE: 23
PIF_VALUE: 15
PIF_VALUE: 25
PIF_VALUE: 13
PIF_VALUE: 24
PIF_VALUE: 24
PIF_VALUE: 23
PIF_VALUE: 22
PIF_VALUE: 24
PIF_VALUE: 16
PIF_VALUE: 9
PIF_VALUE: 23
PIF_VALUE: 24
PIF_VALUE: 24
PIF_VALUE: 0
PIF_VALUE: 23
PIF_VALUE: 16
PIF_VALUE: 25
PIF_VALUE: 25
PIF_VALUE: 27
PIF_VALUE: 24
PIF_VALUE: 24
PIF_VALUE: 23
PIF_VALUE: 24
PIF_VALUE: 24
PIF_VALUE: 15
PIF_VALUE: 25
PIF_VALUE: 16
PIF_VALUE: 24
PIF_VALUE: 27
PIF_VALUE: 4
PIF_VALUE: 24
PIF_VALUE: 23
PIF_VALUE: 16
PIF_VALUE: 23
PIF_VALUE: 24
PIF_VALUE: 23
PIF_VALUE: 24
PIF_VALUE: 16
PIF_VALUE: 25
PIF_VALUE: 24
PIF_VALUE: 25
PIF_VALUE: 25
PIF_VALUE: 24
PIF_VALUE: 24
PIF_VALUE: 4
PIF_VALUE: 15
PIF_VALUE: 24
PIF_VALUE: 16
PIF_VALUE: 23
PIF_VALUE: 15
PIF_VALUE: 23
PIF_VALUE: 24
PIF_VALUE: 0
PIF_VALUE: 24
PIF_VALUE: 23
PIF_VALUE: 25
PIF_VALUE: 24
PIF_VALUE: 16
PIF_VALUE: 4
PIF_VALUE: 25
PIF_VALUE: 24
PIF_VALUE: 1
PIF_VALUE: 21
PIF_VALUE: 24
PIF_VALUE: 15
PIF_VALUE: 23
PIF_VALUE: 24
PIF_VALUE: 21
PIF_VALUE: 24
PIF_VALUE: 24
PIF_VALUE: 5
PIF_VALUE: 24
PIF_VALUE: 23
PIF_VALUE: 24
PIF_VALUE: 25
PIF_VALUE: 24
PIF_VALUE: 23
PIF_VALUE: 24
PIF_VALUE: 25
PIF_VALUE: 24
PIF_VALUE: 24
PIF_VALUE: 20
PIF_VALUE: 24
PIF_VALUE: 24
PIF_VALUE: 16
PIF_VALUE: 24
PIF_VALUE: 16
PIF_VALUE: 1
PIF_VALUE: 24
PIF_VALUE: 24
PIF_VALUE: 20
PIF_VALUE: 24
PIF_VALUE: 24
PIF_VALUE: 23
PIF_VALUE: 24
PIF_VALUE: 23
PIF_VALUE: 9
PIF_VALUE: 23
PIF_VALUE: 4
PIF_VALUE: 24
PIF_VALUE: 23
PIF_VALUE: 24
PIF_VALUE: 1
PIF_VALUE: 23
PIF_VALUE: 4
PIF_VALUE: 15
PIF_VALUE: 25
PIF_VALUE: 24
PIF_VALUE: 24
PIF_VALUE: 23
PIF_VALUE: 23
PIF_VALUE: 16
PIF_VALUE: 20
PIF_VALUE: 24
PIF_VALUE: 23
PIF_VALUE: 16
PIF_VALUE: 24
PIF_VALUE: 24
PIF_VALUE: 16
PIF_VALUE: 23
PIF_VALUE: 24
PIF_VALUE: 23

## 2020-09-25 ASSESSMENT — PAIN DESCRIPTION - FREQUENCY
FREQUENCY: CONTINUOUS
FREQUENCY: CONTINUOUS

## 2020-09-25 ASSESSMENT — PAIN SCALES - GENERAL
PAINLEVEL_OUTOF10: 5
PAINLEVEL_OUTOF10: 5
PAINLEVEL_OUTOF10: 6
PAINLEVEL_OUTOF10: 8
PAINLEVEL_OUTOF10: 5

## 2020-09-25 ASSESSMENT — PAIN DESCRIPTION - ONSET
ONSET: ON-GOING
ONSET: ON-GOING

## 2020-09-25 ASSESSMENT — PAIN DESCRIPTION - PROGRESSION: CLINICAL_PROGRESSION: GRADUALLY WORSENING

## 2020-09-25 ASSESSMENT — PAIN DESCRIPTION - DESCRIPTORS
DESCRIPTORS: CONSTANT;ACHING
DESCRIPTORS: ACHING

## 2020-09-25 ASSESSMENT — PAIN DESCRIPTION - ORIENTATION
ORIENTATION: LEFT
ORIENTATION: LEFT

## 2020-09-25 ASSESSMENT — PAIN DESCRIPTION - LOCATION
LOCATION: ANKLE
LOCATION: FOOT

## 2020-09-25 ASSESSMENT — PAIN DESCRIPTION - PAIN TYPE
TYPE: CHRONIC PAIN
TYPE: SURGICAL PAIN

## 2020-09-25 NOTE — ANESTHESIA PROCEDURE NOTES
Peripheral Block    Patient location during procedure: pre-op  Start time: 9/25/2020 12:53 PM  End time: 9/25/2020 12:53 PM  Staffing  Anesthesiologist: Anastacia Chacon MD  Performed: anesthesiologist   Preanesthetic Checklist  Completed: patient identified, site marked, surgical consent, pre-op evaluation, timeout performed, IV checked, risks and benefits discussed, monitors and equipment checked, anesthesia consent given, oxygen available and patient being monitored  Peripheral Block  Prep: ChloraPrep  Patient monitoring: cardiac monitor, continuous pulse ox, frequent blood pressure checks and IV access  Block type: Sciatic and Saphenous  Laterality: left  Injection technique: single-shot  Procedures: ultrasound guided  Local infiltration: lidocaine  Infiltration strength: 1 %  Dose: 3 mL  Provider prep: mask and sterile gloves  Local infiltration: lidocaine  Needle  Needle type: pencil-tip   Needle gauge: 21 G  Needle length: 10 cm  Needle localization: ultrasound guidance and nerve stimulator  Assessment  Injection assessment: negative aspiration for heme, no paresthesia on injection and local visualized surrounding nerve on ultrasound  Paresthesia pain: none  Slow fractionated injection: yes  Hemodynamics: stable  Additional Notes  U/S 11283.  (1) Under ultrasound guidance, a  gauge needle was inserted and placed in close proximity to the  nerve.  (2) Ultrasound was also used to visualize the spread of the anesthetic in close proximity to the nerve being blocked. (3) The nerve appeared anatomically normal, and (4 there were no apparent abnormal pathological findings on the image that were readily visible and related to the nerve being blocked. (5) A permanent ultrasound image was saved in the patient's record.       30 ml of bupivacaine for popliteal  15 ml for add canal      Medications Administered  Lidocaine injection 1%, 5 mL  bupivacaine (MARCAINE) PF injection 0.5%, 45 mL  Reason for block: post-op pain management and at surgeon's request

## 2020-09-25 NOTE — OP NOTE
abnormal lab from a nonunion laboratory work-up as above was low vitamin D (for which I prescribed him vitamin D supplementation).     Notably, he has no relevant past medical history, other than vitamin D deficiency. He is currently medically stable and appropriate for the planned procedure. I have previously spoken to the patient about the risks/benefits/alternatives to a surgical intervention, he expressed verbal understanding of these risks, and informed consent was obtained. I have answered all questions, and he wishes to proceed with surgical intervention. Operative Note:    The patient was identified in the preoperative holding area by name, MRN, and . The surgical site was verified and marked according to AAOS guidelines. The patient was taken to the operating room and placed on the operating table in a supine position. After achieving adequate sedation by the anesthesia team, the patient was then carefully positioned and all bony prominences were then padded. A tourniquet was placed on the ipsilateral thigh, and then the operative extremity was prepped and draped in the usual sterile fashion. A timeout was held in which the patient's identity, surgical site, procedure, allergies, and antibiotics were verified, and all in the room were in agreement, and thus the procedure began. The leg was exsanguinated to the level of the tourniquet. The tourniquet was elevated to 300 mm Hg, and the total tourniquet time was 88 minutes. Antibiotics were verified until deep cultures were obtained and sent. Attention was turned to the medial distal tibia, utilizing the prior incision. The skin was incised sharply and vessels were cauterized. Careful soft tissue dissection was performed down to the level of hardware, by elevating soft tissue at the edges of the screw.   Direct visualization and fluoroscopy were used to verify the position of the screw, and the screw was removed without complication. Attention was turned to the lateral aspect of the ankle. An incision was marked out over the lateral aspect of the ankle, utilizing the prior incision, and extending both proximal and distal. The skin was incised sharply and vessels were cauterized. Careful sharp and blunt dissection was performed down to the level of the fibula. There was significant bony overgrowth overlying the two 4.0 cannulated screws across the syndesmosis, and this was removed under fluoroscopic guidance with a mallet and an osteotome. This took extensive work in order to localize the buried screws under several millimeters of bone. The two 4.0 mm cannulated screws were eventually removed without complication. The posterior lateral tibiotalar lag screw was then addressed. In order to get to the screw, the syndesmotic fusion was partially taken down utilizing an osteotome under fluoroscopic guidance in order to the separate the fibula from the tibia. Significant proximal soft tissue dissection was then needed in order to get the appropriate angle to take the screw out, up to the level of the distal aspect of the proximal fibula stump. A rongeur was used to debride the scar tissue and help clear the screw head. The osteotome was then used to clear a path from the tibia, as bone had grown all around the proximal aspect of the screw. The screw was eventually removed without complication. Fluoroscopy was used to verify that the desired hardware was removed completely. Copious sterile irrigation was used to rinse the operative sites, and a layered closure was performed using 2-0 vicryl and 3-0 nylon, providing appropriate tension to the skin. The skin was cleaned and gently dried. Steri-Strips were then applied, and anti-bacterial ointment was applied on top of that, with Adaptic and fluffs. A sterile layer of cast padding was then applied.   A short leg splint was then applied with the ankle at neutral.     The patient was aroused from anesthesia without complication, and gently transferred to the bed and then transported to the postoperative area in a stable condition. The patient was noted to have tolerated the procedure well without complication. Postoperative Plan:       WB status:  nonweight bearing x 2-3 weeks anticipated on the Left lower extremity, until the incision(s) heal  DVT ppx:  Early mobilization + medication as prescribed (Aspirin 325 mg PO q Day)  Pain control:  Medication as prescribed (dosing and quantity) indicated for acute postoperative pain control  Special concerns:  given a relatively low vitamin D level as above, the patient will take vitamin supplementation to help maximize bone healing. I will follow-up on intraoperative cultures. Disposition:   PACU, then home    I called the patient's wife multiple times per the patient's preoperative request, to discuss the patient's condition and the particulars of surgery, however her phone line remained busy and I was unable to directly connect with her.          Wilma Pierre MD  Orthopedic Surgery, Foot and Ankle
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
## 2020-09-25 NOTE — ANESTHESIA POSTPROCEDURE EVALUATION
Department of Anesthesiology  Postprocedure Note    Patient: Kamron Reich  MRN: [id number]  YOB: 1980  Date of evaluation: 9/25/2020  Time:  7:01 PM     Procedure Summary     Date:  09/25/20 Room / Location:  [address] - INPATIENT    Anesthesia Start:  5146 Anesthesia Stop:  1838    Procedure:  LEFT ANKLE HARDWARE REMOVAL (Left Ankle) Diagnosis:  (DX RETAINED HARDWARE LEFT ANKLE)    Surgeon:  Marcell Mckinnon MD Responsible Provider:  Deangelo Moore MD    Anesthesia Type:  general, regional ASA Status:  2          Anesthesia Type: general, regional    Mechelle Phase I: Mechelle Score: 10    Mechelle Phase II:      Last vitals: Reviewed and per EMR flowsheets.        Anesthesia Post Evaluation    Complications: no

## 2020-09-26 ENCOUNTER — CLINICAL DOCUMENTATION (OUTPATIENT)
Dept: ORTHOPEDIC SURGERY | Age: 40
End: 2020-09-26

## 2020-09-26 NOTE — PROGRESS NOTES
I called the patient at home for a routine check to discuss how he was doing, as well as reinforce the relevant postoperative restrictions/precautions, discuss the medications I prescribed, and answer any further questions. I was unable to reach him, but did leave a voice message along with my phone number should any concerns arise.

## 2020-09-30 LAB
CULTURE: NORMAL
DIRECT EXAM: NORMAL
Lab: NORMAL
SPECIMEN DESCRIPTION: NORMAL

## 2020-10-11 ENCOUNTER — HOSPITAL ENCOUNTER (OUTPATIENT)
Dept: PREADMISSION TESTING | Age: 40
Setting detail: SPECIMEN
Discharge: HOME | End: 2020-10-15
Payer: COMMERCIAL

## 2020-10-12 ENCOUNTER — OFFICE VISIT (OUTPATIENT)
Dept: ORTHOPEDIC SURGERY | Age: 40
End: 2020-10-12

## 2020-10-12 VITALS — WEIGHT: 249 LBS | BODY MASS INDEX: 40.02 KG/M2 | TEMPERATURE: 97.9 F | RESPIRATION RATE: 12 BRPM | HEIGHT: 66 IN

## 2020-10-12 PROCEDURE — 99024 POSTOP FOLLOW-UP VISIT: CPT | Performed by: ORTHOPAEDIC SURGERY

## 2020-10-12 NOTE — PROGRESS NOTES
risks/benefits/alternatives to a surgical intervention. They understand that the risks of surgery may include but are not limited to pain, infection, bleeding, blood clot, damage to soft tissue/vessel/nerve, future surgery, scarring/stiffness, decreased strength/weakness, cosmetic deformity, neuroma/neuritis/phantom pains, delayed soft tissue/bone healing, nonunion, malunion, failure of hardware/fixation/surgery, iatrogenic fracture/dislocation, damage to bone/joint(s), worsening of condition, recurrence, limb length discrepancy, avascular necrosis of bone, neurovascular compromise/compartment syndrome, tourniquet complications, failure of surgery, dissatisfaction with outcome, loss of limb, stroke, heart attack, pulmonary embolus, mental status change, anesthesia risks/reaction, and even death. They expressed verbal understanding of these risks and wish to proceed with surgical intervention. All questions were answered. No guarantees were made or implied. Informed consent was obtained. The patient was counseled about the risks of jarett Covid-19 during the perioperative period and any recovery window from their procedure. The patient was made aware that jarett Covid-19 may worsen their prognosis for recovering from their procedure and lend to a higher morbidity and/or mortality risk. All material risks, benefits, and reasonable alternatives including postponing the procedure were discussed. The patient does wish to proceed with their procedure at this time. We also had a discussion about the risk of blood clot and thromboembolic events. The patient understands that there is an increased risk with surgery/immobilization, and understands nothing will completely eliminate the risk of DVT/PE's, and that any prophylactic medication does not substitute for early mobilization.  Given his risk profile, I have recommended the following strategy to decrease the risk of blood clots, and the patient agrees and wishes to proceed:  Aspirin 325 mg PO q Day. All questions were answered and the patient agrees with the above plan. The patient will return to clinic in postoperatively         No follow-ups on file. No orders of the defined types were placed in this encounter. No orders of the defined types were placed in this encounter. Madonna Ingram MD  Orthopedic Surgery        Please excuse any typos/errors, as this note was created with the assistance of voice recognition software. While intending to generate a document that actually reflects the content of the visit, the document can still have some errors including those of syntax and sound-a-like substitutions which may escape proof reading. In such instances, actual meaning can be extrapolated by context.

## 2020-10-14 ENCOUNTER — TELEPHONE (OUTPATIENT)
Dept: ORTHOPEDIC SURGERY | Age: 40
End: 2020-10-14

## 2020-10-15 ENCOUNTER — ANESTHESIA EVENT (OUTPATIENT)
Dept: OPERATING ROOM | Age: 40
End: 2020-10-15
Payer: COMMERCIAL

## 2020-10-15 ENCOUNTER — APPOINTMENT (OUTPATIENT)
Dept: GENERAL RADIOLOGY | Age: 40
End: 2020-10-15
Attending: ORTHOPAEDIC SURGERY
Payer: COMMERCIAL

## 2020-10-15 ENCOUNTER — ANESTHESIA (OUTPATIENT)
Dept: OPERATING ROOM | Age: 40
End: 2020-10-15
Payer: COMMERCIAL

## 2020-10-15 ENCOUNTER — HOSPITAL ENCOUNTER (OUTPATIENT)
Age: 40
Setting detail: OUTPATIENT SURGERY
Discharge: HOME | End: 2020-10-15
Attending: ORTHOPAEDIC SURGERY | Admitting: ORTHOPAEDIC SURGERY
Payer: COMMERCIAL

## 2020-10-15 VITALS
DIASTOLIC BLOOD PRESSURE: 78 MMHG | HEART RATE: 104 BPM | SYSTOLIC BLOOD PRESSURE: 145 MMHG | OXYGEN SATURATION: 98 % | BODY MASS INDEX: 37.77 KG/M2 | RESPIRATION RATE: 24 BRPM | TEMPERATURE: 96.8 F | HEIGHT: 66 IN | WEIGHT: 235 LBS

## 2020-10-15 VITALS — DIASTOLIC BLOOD PRESSURE: 78 MMHG | SYSTOLIC BLOOD PRESSURE: 138 MMHG | OXYGEN SATURATION: 100 % | TEMPERATURE: 97.2 F

## 2020-10-15 LAB
SARS-COV-2, RAPID: NOT DETECTED
SARS-COV-2: NORMAL
SARS-COV-2: NORMAL
SOURCE: NORMAL

## 2020-10-15 PROCEDURE — C1734 ORTH/DEVIC/DRUG BN/BN,TIS/BN: HCPCS | Performed by: ORTHOPAEDIC SURGERY

## 2020-10-15 PROCEDURE — 6360000002 HC RX W HCPCS: Performed by: ANESTHESIOLOGY

## 2020-10-15 PROCEDURE — U0002 COVID-19 LAB TEST NON-CDC: HCPCS

## 2020-10-15 PROCEDURE — 7100000001 HC PACU RECOVERY - ADDTL 15 MIN: Performed by: ORTHOPAEDIC SURGERY

## 2020-10-15 PROCEDURE — 6360000002 HC RX W HCPCS: Performed by: SPECIALIST

## 2020-10-15 PROCEDURE — 7100000011 HC PHASE II RECOVERY - ADDTL 15 MIN: Performed by: ORTHOPAEDIC SURGERY

## 2020-10-15 PROCEDURE — 2720000010 HC SURG SUPPLY STERILE: Performed by: ORTHOPAEDIC SURGERY

## 2020-10-15 PROCEDURE — 7100000010 HC PHASE II RECOVERY - FIRST 15 MIN: Performed by: ORTHOPAEDIC SURGERY

## 2020-10-15 PROCEDURE — 7100000000 HC PACU RECOVERY - FIRST 15 MIN: Performed by: ORTHOPAEDIC SURGERY

## 2020-10-15 PROCEDURE — 2709999900 HC NON-CHARGEABLE SUPPLY: Performed by: ORTHOPAEDIC SURGERY

## 2020-10-15 PROCEDURE — 27870 FUSION OF ANKLE JOINT OPEN: CPT | Performed by: ORTHOPAEDIC SURGERY

## 2020-10-15 PROCEDURE — 3600000003 HC SURGERY LEVEL 3 BASE: Performed by: ORTHOPAEDIC SURGERY

## 2020-10-15 PROCEDURE — C1713 ANCHOR/SCREW BN/BN,TIS/BN: HCPCS | Performed by: ORTHOPAEDIC SURGERY

## 2020-10-15 PROCEDURE — 3600000013 HC SURGERY LEVEL 3 ADDTL 15MIN: Performed by: ORTHOPAEDIC SURGERY

## 2020-10-15 PROCEDURE — 73600 X-RAY EXAM OF ANKLE: CPT

## 2020-10-15 PROCEDURE — 3700000001 HC ADD 15 MINUTES (ANESTHESIA): Performed by: ORTHOPAEDIC SURGERY

## 2020-10-15 PROCEDURE — 2580000003 HC RX 258: Performed by: ANESTHESIOLOGY

## 2020-10-15 PROCEDURE — 27726 REPAIR FIBULA NONUNION: CPT | Performed by: ORTHOPAEDIC SURGERY

## 2020-10-15 PROCEDURE — 3700000000 HC ANESTHESIA ATTENDED CARE: Performed by: ORTHOPAEDIC SURGERY

## 2020-10-15 PROCEDURE — 6360000002 HC RX W HCPCS: Performed by: ORTHOPAEDIC SURGERY

## 2020-10-15 PROCEDURE — 64445 NJX AA&/STRD SCIATIC NRV IMG: CPT | Performed by: ANESTHESIOLOGY

## 2020-10-15 PROCEDURE — 6370000000 HC RX 637 (ALT 250 FOR IP): Performed by: ORTHOPAEDIC SURGERY

## 2020-10-15 PROCEDURE — 6370000000 HC RX 637 (ALT 250 FOR IP): Performed by: ANESTHESIOLOGY

## 2020-10-15 PROCEDURE — 27606 INCISION OF ACHILLES TENDON: CPT | Performed by: ORTHOPAEDIC SURGERY

## 2020-10-15 PROCEDURE — 3209999900 FLUORO FOR SURGICAL PROCEDURES

## 2020-10-15 PROCEDURE — 2500000003 HC RX 250 WO HCPCS: Performed by: SPECIALIST

## 2020-10-15 DEVICE — 7.0 X 50 MM HEADED, CANNULATED, SHORT THREAD SCREW
Type: IMPLANTABLE DEVICE | Site: ANKLE | Status: FUNCTIONAL
Brand: MONSTER SCREW SYSTEM

## 2020-10-15 DEVICE — TIM-GRAFT BONE AUGMENT 3ML INJ: Type: IMPLANTABLE DEVICE | Site: ANKLE | Status: FUNCTIONAL

## 2020-10-15 RX ORDER — VANCOMYCIN HYDROCHLORIDE 1 G/20ML
INJECTION, POWDER, LYOPHILIZED, FOR SOLUTION INTRAVENOUS PRN
Status: DISCONTINUED | OUTPATIENT
Start: 2020-10-15 | End: 2020-10-15 | Stop reason: ALTCHOICE

## 2020-10-15 RX ORDER — SODIUM CHLORIDE 0.9 % (FLUSH) 0.9 %
10 SYRINGE (ML) INJECTION EVERY 12 HOURS SCHEDULED
Status: DISCONTINUED | OUTPATIENT
Start: 2020-10-15 | End: 2020-10-15 | Stop reason: HOSPADM

## 2020-10-15 RX ORDER — OXYCODONE HYDROCHLORIDE AND ACETAMINOPHEN 5; 325 MG/1; MG/1
1 TABLET ORAL EVERY 6 HOURS PRN
Qty: 20 TABLET | Refills: 0 | Status: SHIPPED | OUTPATIENT
Start: 2020-10-15 | End: 2020-10-22

## 2020-10-15 RX ORDER — HYDROMORPHONE HCL 110MG/55ML
0.5 PATIENT CONTROLLED ANALGESIA SYRINGE INTRAVENOUS EVERY 5 MIN PRN
Status: DISCONTINUED | OUTPATIENT
Start: 2020-10-15 | End: 2020-10-15 | Stop reason: HOSPADM

## 2020-10-15 RX ORDER — LIDOCAINE HYDROCHLORIDE 10 MG/ML
1 INJECTION, SOLUTION EPIDURAL; INFILTRATION; INTRACAUDAL; PERINEURAL
Status: DISCONTINUED | OUTPATIENT
Start: 2020-10-15 | End: 2020-10-15 | Stop reason: HOSPADM

## 2020-10-15 RX ORDER — SODIUM CHLORIDE 0.9 % (FLUSH) 0.9 %
10 SYRINGE (ML) INJECTION PRN
Status: DISCONTINUED | OUTPATIENT
Start: 2020-10-15 | End: 2020-10-15 | Stop reason: HOSPADM

## 2020-10-15 RX ORDER — DEXAMETHASONE SODIUM PHOSPHATE 10 MG/ML
INJECTION INTRAMUSCULAR; INTRAVENOUS PRN
Status: DISCONTINUED | OUTPATIENT
Start: 2020-10-15 | End: 2020-10-15 | Stop reason: SDUPTHER

## 2020-10-15 RX ORDER — ONDANSETRON 2 MG/ML
4 INJECTION INTRAMUSCULAR; INTRAVENOUS
Status: DISCONTINUED | OUTPATIENT
Start: 2020-10-15 | End: 2020-10-15 | Stop reason: HOSPADM

## 2020-10-15 RX ORDER — ONDANSETRON 2 MG/ML
INJECTION INTRAMUSCULAR; INTRAVENOUS PRN
Status: DISCONTINUED | OUTPATIENT
Start: 2020-10-15 | End: 2020-10-15 | Stop reason: SDUPTHER

## 2020-10-15 RX ORDER — GINSENG 100 MG
CAPSULE ORAL PRN
Status: DISCONTINUED | OUTPATIENT
Start: 2020-10-15 | End: 2020-10-15 | Stop reason: ALTCHOICE

## 2020-10-15 RX ORDER — LIDOCAINE HYDROCHLORIDE 20 MG/ML
INJECTION, SOLUTION EPIDURAL; INFILTRATION; INTRACAUDAL; PERINEURAL PRN
Status: DISCONTINUED | OUTPATIENT
Start: 2020-10-15 | End: 2020-10-15 | Stop reason: SDUPTHER

## 2020-10-15 RX ORDER — ASPIRIN 325 MG
325 TABLET, DELAYED RELEASE (ENTERIC COATED) ORAL DAILY
Qty: 42 TABLET | Refills: 0 | Status: SHIPPED | OUTPATIENT
Start: 2020-10-15 | End: 2022-01-11

## 2020-10-15 RX ORDER — FENTANYL CITRATE 50 UG/ML
50 INJECTION, SOLUTION INTRAMUSCULAR; INTRAVENOUS EVERY 5 MIN PRN
Status: DISCONTINUED | OUTPATIENT
Start: 2020-10-15 | End: 2020-10-15 | Stop reason: HOSPADM

## 2020-10-15 RX ORDER — GLYCOPYRROLATE 1 MG/5 ML
SYRINGE (ML) INTRAVENOUS PRN
Status: DISCONTINUED | OUTPATIENT
Start: 2020-10-15 | End: 2020-10-15 | Stop reason: SDUPTHER

## 2020-10-15 RX ORDER — PROPOFOL 10 MG/ML
INJECTION, EMULSION INTRAVENOUS PRN
Status: DISCONTINUED | OUTPATIENT
Start: 2020-10-15 | End: 2020-10-15 | Stop reason: SDUPTHER

## 2020-10-15 RX ORDER — SODIUM CHLORIDE, SODIUM LACTATE, POTASSIUM CHLORIDE, CALCIUM CHLORIDE 600; 310; 30; 20 MG/100ML; MG/100ML; MG/100ML; MG/100ML
INJECTION, SOLUTION INTRAVENOUS CONTINUOUS
Status: DISCONTINUED | OUTPATIENT
Start: 2020-10-15 | End: 2020-10-15 | Stop reason: HOSPADM

## 2020-10-15 RX ORDER — ACETAMINOPHEN 500 MG
1000 TABLET ORAL ONCE
Status: COMPLETED | OUTPATIENT
Start: 2020-10-15 | End: 2020-10-15

## 2020-10-15 RX ORDER — FENTANYL CITRATE 50 UG/ML
25 INJECTION, SOLUTION INTRAMUSCULAR; INTRAVENOUS EVERY 5 MIN PRN
Status: DISCONTINUED | OUTPATIENT
Start: 2020-10-15 | End: 2020-10-15 | Stop reason: HOSPADM

## 2020-10-15 RX ORDER — HYDROMORPHONE HCL 110MG/55ML
0.25 PATIENT CONTROLLED ANALGESIA SYRINGE INTRAVENOUS EVERY 5 MIN PRN
Status: DISCONTINUED | OUTPATIENT
Start: 2020-10-15 | End: 2020-10-15 | Stop reason: HOSPADM

## 2020-10-15 RX ORDER — ROPIVACAINE HYDROCHLORIDE 5 MG/ML
INJECTION, SOLUTION EPIDURAL; INFILTRATION; PERINEURAL PRN
Status: DISCONTINUED | OUTPATIENT
Start: 2020-10-15 | End: 2020-10-15 | Stop reason: SDUPTHER

## 2020-10-15 RX ORDER — ROCURONIUM BROMIDE 10 MG/ML
INJECTION, SOLUTION INTRAVENOUS PRN
Status: DISCONTINUED | OUTPATIENT
Start: 2020-10-15 | End: 2020-10-15 | Stop reason: SDUPTHER

## 2020-10-15 RX ORDER — SODIUM CHLORIDE 9 MG/ML
INJECTION, SOLUTION INTRAVENOUS CONTINUOUS
Status: DISCONTINUED | OUTPATIENT
Start: 2020-10-15 | End: 2020-10-15

## 2020-10-15 RX ORDER — FENTANYL CITRATE 50 UG/ML
INJECTION, SOLUTION INTRAMUSCULAR; INTRAVENOUS PRN
Status: DISCONTINUED | OUTPATIENT
Start: 2020-10-15 | End: 2020-10-15 | Stop reason: SDUPTHER

## 2020-10-15 RX ADMIN — NEOSTIGMINE METHYLSULFATE 3 MG: 1 INJECTION, SOLUTION INTRAMUSCULAR; INTRAVENOUS; SUBCUTANEOUS at 12:48

## 2020-10-15 RX ADMIN — SODIUM CHLORIDE, POTASSIUM CHLORIDE, SODIUM LACTATE AND CALCIUM CHLORIDE: 600; 310; 30; 20 INJECTION, SOLUTION INTRAVENOUS at 10:24

## 2020-10-15 RX ADMIN — Medication 50 MCG: at 12:51

## 2020-10-15 RX ADMIN — HYDROMORPHONE HYDROCHLORIDE 0.5 MG: 2 INJECTION, SOLUTION INTRAMUSCULAR; INTRAVENOUS; SUBCUTANEOUS at 13:49

## 2020-10-15 RX ADMIN — HYDROMORPHONE HYDROCHLORIDE 0.5 MG: 2 INJECTION, SOLUTION INTRAMUSCULAR; INTRAVENOUS; SUBCUTANEOUS at 14:05

## 2020-10-15 RX ADMIN — HYDROMORPHONE HYDROCHLORIDE 0.5 MG: 2 INJECTION, SOLUTION INTRAMUSCULAR; INTRAVENOUS; SUBCUTANEOUS at 13:44

## 2020-10-15 RX ADMIN — Medication 0.2 MG: at 12:56

## 2020-10-15 RX ADMIN — LIDOCAINE HYDROCHLORIDE 100 MG: 20 INJECTION, SOLUTION EPIDURAL; INFILTRATION; INTRACAUDAL; PERINEURAL at 10:27

## 2020-10-15 RX ADMIN — ROPIVACAINE HYDROCHLORIDE 40 ML: 5 INJECTION, SOLUTION EPIDURAL; INFILTRATION; PERINEURAL at 14:50

## 2020-10-15 RX ADMIN — SODIUM CHLORIDE, POTASSIUM CHLORIDE, SODIUM LACTATE AND CALCIUM CHLORIDE: 600; 310; 30; 20 INJECTION, SOLUTION INTRAVENOUS at 13:10

## 2020-10-15 RX ADMIN — SODIUM CHLORIDE, POTASSIUM CHLORIDE, SODIUM LACTATE AND CALCIUM CHLORIDE: 600; 310; 30; 20 INJECTION, SOLUTION INTRAVENOUS at 09:11

## 2020-10-15 RX ADMIN — PROPOFOL 200 MG: 10 INJECTION, EMULSION INTRAVENOUS at 10:27

## 2020-10-15 RX ADMIN — Medication 0.4 MG: at 12:48

## 2020-10-15 RX ADMIN — CEFAZOLIN 2 G: 10 INJECTION, POWDER, FOR SOLUTION INTRAVENOUS at 10:38

## 2020-10-15 RX ADMIN — ACETAMINOPHEN 1000 MG: 500 TABLET ORAL at 09:16

## 2020-10-15 RX ADMIN — DEXAMETHASONE SODIUM PHOSPHATE 10 MG: 10 INJECTION INTRAMUSCULAR; INTRAVENOUS at 10:50

## 2020-10-15 RX ADMIN — ONDANSETRON 4 MG: 2 INJECTION, SOLUTION INTRAMUSCULAR; INTRAVENOUS at 10:50

## 2020-10-15 RX ADMIN — Medication 100 MCG: at 10:27

## 2020-10-15 RX ADMIN — Medication 50 MCG: at 10:57

## 2020-10-15 RX ADMIN — ROCURONIUM BROMIDE 50 MG: 10 INJECTION, SOLUTION INTRAVENOUS at 10:27

## 2020-10-15 ASSESSMENT — PULMONARY FUNCTION TESTS
PIF_VALUE: 23
PIF_VALUE: 22
PIF_VALUE: 23
PIF_VALUE: 12
PIF_VALUE: 23
PIF_VALUE: 22
PIF_VALUE: 24
PIF_VALUE: 23
PIF_VALUE: 2
PIF_VALUE: 2
PIF_VALUE: 24
PIF_VALUE: 23
PIF_VALUE: 23
PIF_VALUE: 21
PIF_VALUE: 23
PIF_VALUE: 23
PIF_VALUE: 22
PIF_VALUE: 23
PIF_VALUE: 23
PIF_VALUE: 24
PIF_VALUE: 23
PIF_VALUE: 23
PIF_VALUE: 24
PIF_VALUE: 16
PIF_VALUE: 24
PIF_VALUE: 23
PIF_VALUE: 9
PIF_VALUE: 2
PIF_VALUE: 1
PIF_VALUE: 22
PIF_VALUE: 23
PIF_VALUE: 24
PIF_VALUE: 2
PIF_VALUE: 24
PIF_VALUE: 4
PIF_VALUE: 23
PIF_VALUE: 3
PIF_VALUE: 18
PIF_VALUE: 24
PIF_VALUE: 6
PIF_VALUE: 23
PIF_VALUE: 2
PIF_VALUE: 23
PIF_VALUE: 2
PIF_VALUE: 2
PIF_VALUE: 19
PIF_VALUE: 2
PIF_VALUE: 23
PIF_VALUE: 23
PIF_VALUE: 24
PIF_VALUE: 23
PIF_VALUE: 2
PIF_VALUE: 22
PIF_VALUE: 22
PIF_VALUE: 3
PIF_VALUE: 24
PIF_VALUE: 23
PIF_VALUE: 27
PIF_VALUE: 23
PIF_VALUE: 23
PIF_VALUE: 1
PIF_VALUE: 24
PIF_VALUE: 22
PIF_VALUE: 10
PIF_VALUE: 24
PIF_VALUE: 24
PIF_VALUE: 23
PIF_VALUE: 24
PIF_VALUE: 2
PIF_VALUE: 23
PIF_VALUE: 7
PIF_VALUE: 23
PIF_VALUE: 1
PIF_VALUE: 2
PIF_VALUE: 20
PIF_VALUE: 24
PIF_VALUE: 24
PIF_VALUE: 2
PIF_VALUE: 23
PIF_VALUE: 31
PIF_VALUE: 24
PIF_VALUE: 23
PIF_VALUE: 23
PIF_VALUE: 2
PIF_VALUE: 2
PIF_VALUE: 23
PIF_VALUE: 23
PIF_VALUE: 2
PIF_VALUE: 23
PIF_VALUE: 3
PIF_VALUE: 20
PIF_VALUE: 2
PIF_VALUE: 1
PIF_VALUE: 24
PIF_VALUE: 23
PIF_VALUE: 20
PIF_VALUE: 24
PIF_VALUE: 19
PIF_VALUE: 23
PIF_VALUE: 23
PIF_VALUE: 22
PIF_VALUE: 23
PIF_VALUE: 22
PIF_VALUE: 22
PIF_VALUE: 19
PIF_VALUE: 24
PIF_VALUE: 23
PIF_VALUE: 22
PIF_VALUE: 23
PIF_VALUE: 2
PIF_VALUE: 23
PIF_VALUE: 24
PIF_VALUE: 20
PIF_VALUE: 20
PIF_VALUE: 24
PIF_VALUE: 13
PIF_VALUE: 23
PIF_VALUE: 23
PIF_VALUE: 5
PIF_VALUE: 2
PIF_VALUE: 18
PIF_VALUE: 24
PIF_VALUE: 23
PIF_VALUE: 2
PIF_VALUE: 2
PIF_VALUE: 19
PIF_VALUE: 24
PIF_VALUE: 22
PIF_VALUE: 23
PIF_VALUE: 23
PIF_VALUE: 24
PIF_VALUE: 22
PIF_VALUE: 1
PIF_VALUE: 24
PIF_VALUE: 22
PIF_VALUE: 23
PIF_VALUE: 23
PIF_VALUE: 22
PIF_VALUE: 24
PIF_VALUE: 22
PIF_VALUE: 17
PIF_VALUE: 2
PIF_VALUE: 24
PIF_VALUE: 23
PIF_VALUE: 25
PIF_VALUE: 24
PIF_VALUE: 23
PIF_VALUE: 24
PIF_VALUE: 2
PIF_VALUE: 23
PIF_VALUE: 24
PIF_VALUE: 23
PIF_VALUE: 22
PIF_VALUE: 23
PIF_VALUE: 24
PIF_VALUE: 23
PIF_VALUE: 2
PIF_VALUE: 23
PIF_VALUE: 23
PIF_VALUE: 2
PIF_VALUE: 22
PIF_VALUE: 2
PIF_VALUE: 22
PIF_VALUE: 24
PIF_VALUE: 24
PIF_VALUE: 23
PIF_VALUE: 23
PIF_VALUE: 2
PIF_VALUE: 2
PIF_VALUE: 24
PIF_VALUE: 23
PIF_VALUE: 24

## 2020-10-15 ASSESSMENT — PAIN DESCRIPTION - DESCRIPTORS
DESCRIPTORS: THROBBING
DESCRIPTORS: ACHING;THROBBING
DESCRIPTORS: ACHING
DESCRIPTORS: ACHING;THROBBING

## 2020-10-15 ASSESSMENT — PAIN - FUNCTIONAL ASSESSMENT
PAIN_FUNCTIONAL_ASSESSMENT: PREVENTS OR INTERFERES SOME ACTIVE ACTIVITIES AND ADLS
PAIN_FUNCTIONAL_ASSESSMENT: 0-10

## 2020-10-15 ASSESSMENT — PAIN DESCRIPTION - LOCATION
LOCATION: FOOT

## 2020-10-15 ASSESSMENT — PAIN DESCRIPTION - PAIN TYPE
TYPE: SURGICAL PAIN

## 2020-10-15 ASSESSMENT — PAIN SCALES - GENERAL
PAINLEVEL_OUTOF10: 8
PAINLEVEL_OUTOF10: 3
PAINLEVEL_OUTOF10: 8

## 2020-10-15 ASSESSMENT — PAIN DESCRIPTION - ORIENTATION
ORIENTATION: LEFT
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
## 2020-10-15 NOTE — BRIEF OP NOTE
Brief Postoperative Note      Patient: Sindi Woods  YOB: 1980  MRN: [id number]    Date of Procedure: 10/15/2020    1. 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 Left fibula malunion  3. Left ankle equinus contracture  4. History of open ankle fracture dislocation  5. Vitamin D deficiency  Body mass index is 37.93 kg/m². Post-Op Diagnosis: Same       Procedure:   1. 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 Left fibula open treatment of fibular malunion (with osteotomy and excision of bone)  3. Left percutaneous tendoachilles lengthening, performed through separate incision    Surgeon(s):  Cintia Hernandez MD    Assistant:  Resident: Jojre Caballero DO    Anesthesia: General    Estimated Blood Loss (mL): less than 50     Complications: None    Specimens:   * No specimens in log *    Implants:  Implant Name Type Inv. Item Serial No.  Lot No. LRB No. Used Action   CHING-GRAFT BONE AUGMENT 3ML INJ Bone/Graft/Tissue/Human/Synth CHING-GRAFT BONE AUGMENT 3ML INJ  [employer] [id number] Left 1 Implanted   SCREW MONSTER DARIUS SHRT-THRD 7.0X50MM HE Screw/Plate/Nail/Mateus SCREW MONSTER DARIUS SHRT-THRD 7.0X50MM HE  PARAGON 28  Left 1 Implanted   ANTERIOR TT PLATE, FLAT LEFT     PARAGON 28  Left 1 Implanted   4.2 X 24MM PLATE SCREW    PARAGON 28  Left 2 Implanted   4.7 X 30MM NON-LOCKING SCREW    PARAGON 28  Left 1 Implanted   4.7 X 38MM PLATE SCREW    PARAGON 28  Left 1 Implanted   4.7 X 34MM PLATE SCREW    PARAGON 28  Left 1 Implanted   4.2 X 26MM PLATE SCREW     PARAGON 28  Left 1 Implanted         Drains: * No LDAs found *    Findings: See op note for details.      Electronically signed by Jorje Caballero DO on 10/15/2020 at 1:44 PM

## 2020-10-15 NOTE — OP NOTE
[place] [address]  Dept: [phone]: [phone]      Orthopedic Surgery Operative Report        Patient: Jackee Gosselin      MRN#: [id number]     YOB: 1980      Date of Admission: 10/15/2020    Attending Surgeon: Nyla Arreola M.D.   PCP: ALEXA Henry NP        Preoperative Diagnosis:   1. 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 Left fibula malunion  3. Left ankle equinus contracture  4. History of open ankle fracture dislocation  5. Vitamin D deficiency  6. Body mass index is 37.93 kg/m². Postoperative Diagnosis:   1. Same as above    Procedures Performed:  (10/15/2020)  1. 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 Left fibula open treatment of fibular malunion (with osteotomy and excision of bone)  3. Left percutaneous tendoachilles lengthening, performed through separate incision      Implants:    [place] 28 anterior ankle fusion plate and partially-threaded cannulated 7.0 screw  Implant Name Type Inv.  Item Serial No.  Lot No. LRB No. Used Action   CHING-GRAFT BONE AUGMENT 3ML INJ Bone/Graft/Tissue/Human/Synth CHING-GRAFT BONE AUGMENT 3ML INJ  [employer] [id number] Left 1 Implanted   SCREW MONSTER DARIUS SHRT-THRD 7.0X50MM HE Screw/Plate/Nail/Mateus SCREW MONSTER DARIUS SHRT-THRD 7.0X50MM HE  PARAGON 28  Left 1 Implanted   ANTERIOR TT PLATE, FLAT LEFT     PARAGON 28  Left 1 Implanted   4.2 X 24MM PLATE SCREW    PARAGON 28  Left 2 Implanted   4.7 X 30MM NON-LOCKING SCREW    PARAGON 28  Left 1 Implanted   4.7 X 38MM PLATE SCREW    PARAGON 28  Left 1 Implanted   4.7 X 34MM PLATE SCREW    PARAGON 28  Left 1 Implanted          1 Implanted         Attending Surgeon:     Vianey Chau MD      Assistant Surgeon:    Resident: Betty Sanchez DO      Anesthesia:    General      Staff:  Surgeon(s):  Cristy Hernandez MD  Relief Scrub: Griselda Poncho  Scrub Person First: Alex Lindsey  Anesthesia: Daniel Velásquez MD      Estimated Blood Loss:    Less than 897 ml      Complications:    None      Specimen:    * No specimens in log *      History:    Mr. Lucia Farrell is a 45 y.o. male who was seen recently for the above problem. He has left posttraumatic tibiotalar arthritis with a complicated history surrounding his left ankle. He initially had an open ankle fracture, and had multiple surgeries to fix his fracture, however his fibula never healed. He eventually underwent an attempted left ankle fusion with syndesmosis fusion by Dr. Michael Vicente in April 2019, but unfortunately his left tibiotalar joint went on to develop a nonunion, with the ankle tipped into valgus, and his fibula went on to malunion. His infectious labs have all been within normal limits, and the only abnormal lab from a nonunion laboratory work-up as above was low vitamin D (for which I prescribed him vitamin D supplementation). On 9/25/2020, I took the patient to the operating room for a left ankle removal of hardware, and deep cultures were sent. The deep cultures were discarded after 5 days, but they remained negative. The patient and I had numerous discussions, and the plan was a staged revision ankle fusion with treatment of his fibula malunion. Today's trip to the operating room was the planned second stage, 2 refuses ankle and improved position and treat his fibula malunion. He is currently medically stable and appropriate for the planned procedure. We have spoken about the risks/benefits/alternatives to a surgical intervention, verbal understanding of these risks was expressed, and informed consent was obtained. All questions were answered. No guarantees were made or implied. I have answered all questions, and they wish to proceed with surgical intervention.        Labs:   Lab Results   Component Value Date    VITD25 17.8 (L) 07/15/2020       Operative Note:    The patient was identified in the preoperative holding area by name, MRN, and . The surgical site was verified and marked according to AAOS guidelines. The patient was taken to the operating room and placed on the operating table in a supine position. After achieving adequate sedation by the anesthesia team, the patient was then carefully positioned and all bony prominences were well padded. A tourniquet was placed on the ipsilateral thigh, and then the operative extremity was prepped and draped in the usual sterile fashion. A timeout was held in which the patient's identity, surgical site, procedure, allergies, and antibiotics were verified, and all in the room were in agreement, and thus the procedure began. The procedure began with a triple hemisection tendo-Achilles lengthening to allow more dorsiflexion and help neutralize the talus. This was accomplished by elevating the leg off the bed and using a 15-blade to release the tendon at three percutaneous stab incisions, approximately 3 cm apart. After completing this, improved dorsiflexion was immediately noted. The leg was then returned to its position on the bed. The leg was exsanguinated to the level of the tourniquet, and the tourniquet was then elevated to 275 mm Hg. The total tourniquet time was 120 minutes. An incision about 10 cm in length was marked out over the midline of the ankle, about 1 cm lateral to the tibial crest extending distally over the center of the ankle, extending to about the level of the talar head. The skin was incised sharply and vessels were cauterized. The superficial peroneal nerve was carefully protected. Careful dissection was then performed through the subcutaneous tissues, to identify deep fascia and extensor retinaculum, which was incised in-line with the incision to develop the interval between Tibialis Anterior and the EHL tendon, safely retracting the deep neurovascular bundle laterally.  An arthrotomy was performed at the tibiotalar joint to expose the joint surface. A full thickness flap including periosteum was then elevated off the tibia distally both medially and laterally, to expose the ankle joint. There was significant arthrosis with scarring at the tibiotalar joint, and the plafond was noted to be in valgus. The distal fibula was malunited posterior laterally to the tibiotalar joint, and this was accessed through the anterior ankle wound. An osteotome was used to perform an osteotomy at the level of the tibiotalar joint, and an osteotome and a rongeur were then used distally to remove the excess bone which was impinging on the tibiotalar joint and in the lateral gutter. This was extricated from the wound and discarded. Fluoroscopy was used to ensure the appropriate level of the osteotomy as well as that the appropriate resection had been performed. Osteotomes and laminar spreaders were used to localize the level of the joint under fluoroscopy and then mobilize the tibiotalar joint appropriately, in order to provide distraction and expose the tibiotalar joint. Due to the multiple prior surgeries, this took an extensive amount of work to free this up appropriately, which required substantially greater work then normal.      The tibiotalar joint was prepared for fusion by denuding all the bone and nonunion tissue interposed at the tibiotalar joint, using a curette, a rongeur, and osteotomes. The joint was then irrigated to remove debris. The tibia and the talus were noted to be alive and well perfused with good healthy bleeding. The medial aspect of the tibiotalar joint was planed down to help decrease the valgus tilt, using a high-speed bur. The subchondral plate was fenestrated to stimulate healing. Bone graft [facility] PDGF augment and local autograft) was added and packed into place.  The joint was manually reduced and provisionally pinned into place to check the alignment both clinically and on fluoroscopy. The joint was deemed to be in the appropriate position. Multiple guide wires were then advanced under fluoroscopic guidance to hold the joint in the desired position. After fluoroscopy was used to verify the position of the guidewires, a percutaneous incision was made at the posteromedial aspect of the tibia, with careful blunt spreading down to bone and a guidewire was advanced in an antegrade fashion into the talus. Fluoroscopy and direct visualization were used to verify the position of the ankle and foot, and deemed to be appropriate. The tibiotalar joint was then fused using the following hardware:  one 7.0 mm screw and an anterior ankle fusion plate. Good compression across the joint was noted upon insertion by hand of the above hardware after predrilling. Additional compression was also obtained through the plate by drilling eccentrically. Proximal to the joint line, there were a total of 3 screws placed, a combination of 4.7 mm cortical and locking screws by predrilling and measuring. Distal to the joint line, there were a total of 3 screws placed, 4.2 mm locking screws by predrilling and measuring. Locking fixation was used at the sites of far cortex comminution. Final radiographs were obtained and reviewed, showing the hardware was positioned safely. The foot was again checked clinically, and the position of the fusion appeared appropriate. The surrounding joints moved appropriately and without crepitus. The foot and toes were all noted to be well perfused. The fixation was stable. Final radiographs were obtained and reviewed, showing the hardware was positioned safely. The foot was again checked clinically, and the position of the fusion appeared appropriate. The foot and toes were all noted to be well perfused. The fixation was stable. After irrigating, vancomycin powder was sprinkled into the wound deeply over the hardware to help prevent infection.  Copious sterile irrigation was used to rinse the operative sites, and a layered closure was performed using 0 vicryl, 2-0 vicryl and 3-0 nylon, providing appropriate tension to the skin. The skin was cleaned and gently dried. Steri-Strips were then applied, and anti-bacterial ointment was applied on top of that, with Adaptic and fluffs. A sterile layer of cast padding was then applied, and a short leg splint was then applied with the ankle at neutral.    The patient was aroused from anesthesia without complication, and gently transferred to the bed and then transported to the postoperative area in a stable condition. The patient was noted to have tolerated the procedure well without complication. Postoperative Plan:         Precautions:  nonweight bearing x 8 weeks anticipated on the Left lower extremity   -             []  Physical Therapy/Home exercises       Immobilization:      [x]  Splint/Cast  []  CAM boot  []  Comfortable shoe    []  Other:      DVT ppx:   [x]  Early mobilization       [x]  Medication as prescribed (Aspirin 325 mg PO q Day)      []  No chemical ppx needed; mechanical only   -    Pain control:  Medication as prescribed (dosing and quantity) indicated for acute postoperative pain control   -    Special concerns:       [x]  Vitamin D -- due to relatively low level, the patient will take supplementation         []  Prealbumin -- due to relatively low level, the patient will take supplementation    []  Glucose control            []  Tobacco cessation     []  Pin pulling in office at 6 weeks anticipated    []  Follow up on pathology results    []  Follow up on culture results    [x] Bone stimulator        [x]  Avoid strenuous activity/pain provoking maneuvers and high-impact repetitive exercises    -    Disposition:   PACU      The patient has been instructed to follow up in the office.     Postoperatively, I contacted the patient's fiancé, per his request, however she was unavailable by phone, so I

## 2020-10-15 NOTE — ANESTHESIA POSTPROCEDURE EVALUATION
Department of Anesthesiology  Postprocedure Note    Patient: Tito Dates  MRN: [id number]  YOB: 1980  Date of evaluation: 10/15/2020  Time:  3:05 PM     Procedure Summary     Date:  10/15/20 Room / Location:  [facility] OR 01 / [facility] - INPATIENT    Anesthesia Start:  1024 Anesthesia Stop:  1331    Procedure:  LEFT ANKLE FUSION WITH BONE GRAFT- PARAGON 28 (Left Ankle) Diagnosis:  (DX LEFT ANKLE OA)    Surgeon:  Serge Regalado MD Responsible Provider:  Taiwo More MD    Anesthesia Type:  general, regional ASA Status:  2          Anesthesia Type: general, regional    Mechelle Phase I: Mechelle Score: 8    Mechelle Phase II:      Last vitals: Reviewed and per EMR flowsheets.        Anesthesia Post Evaluation    Patient location during evaluation: PACU  Level of consciousness: awake and alert  Complications: no

## 2020-10-15 NOTE — ANESTHESIA PROCEDURE NOTES
Peripheral Block    Patient location during procedure: PACU  Start time: 10/15/2020 2:40 PM  End time: 10/15/2020 2:55 PM  Staffing  Anesthesiologist: Shannan Kapoor MD  Performed: anesthesiologist   Preanesthetic Checklist  Completed: patient identified, site marked, surgical consent, pre-op evaluation, timeout performed, IV checked, risks and benefits discussed, monitors and equipment checked, anesthesia consent given, oxygen available and patient being monitored  Peripheral Block  Patient position: supine  Prep: ChloraPrep  Patient monitoring: cardiac monitor, continuous pulse ox, frequent blood pressure checks and IV access  Block type: Sciatic  Laterality: left  Injection technique: single-shot  Procedures: ultrasound guided  Local infiltration: lidocaine  Infiltration strength: 1 %  Dose: 3 mL  Popliteal  Provider prep: mask and sterile gloves  Local infiltration: lidocaine  Needle  Needle type: combined needle/nerve stimulator   Needle gauge: 21 G  Needle length: 10 cm  Needle localization: ultrasound guidance  Assessment  Injection assessment: negative aspiration for heme, no paresthesia on injection and local visualized surrounding nerve on ultrasound  Paresthesia pain: none  Slow fractionated injection: yes  Hemodynamics: stable  Additional Notes  U/S 81290.  (1) Under ultrasound guidance, a 21 gauge needle was inserted and placed in close proximity to the nerve.  (2) Ultrasound was also used to visualize the spread of the anesthetic in close proximity to the nerve being blocked. (3)  A permanent ultrasound image was saved in the patient's record.             Reason for block: post-op pain management and at surgeon's request

## 2020-10-15 NOTE — H&P
History and Physical Update    Pt Name: Dalia Choi  MRN: [id number]  YOB: 1980  Date of evaluation: 10/15/2020      [x] I have reviewed the Orthopedic Progress Note by Dr Guillermina Gorman dated 10/12/20 in epic which meets the criteria for an Interval History and Physical note and is attached below. [x] I have examined  Dalia Choi  There are no changes to the patient who is scheduled for a left ankle fusion possible calcaneal osteotomy, possible bone graft by Dr Guillermina Gorman for left ankle OA. Patient s/p left ankle hardware removal 6/99/53 w/o complications. Patient returns for scheduled ankle fusion. He denies health changes, fever, chills, wheezing, cough, increased SOB, chest pain, open sores or wounds. Last  mg 10/12/20     Vital signs: /76   Pulse 79   Temp 97.7 °F (36.5 °C) (Temporal)   Resp 18   SpO2 96%     Allergies:  Patient has no known allergies. Medications:    Prior to Admission medications    Medication Sig Start Date End Date Taking? Authorizing Provider   oxyCODONE-acetaminophen (PERCOCET)  MG per tablet Take 1 tablet by mouth every 4 hours as needed for Pain. Historical Provider, MD   docusate sodium (COLACE) 100 MG capsule Take 100 mg by mouth 2 times daily    Historical Provider, MD   acetaminophen (TYLENOL) 325 MG tablet Take 325 mg by mouth every 6 hours as needed for Pain    Historical Provider, MD   aspirin 325 MG EC tablet Take 1 tablet by mouth daily 9/25/20 11/6/20  Ed Shepard MD   ALPRAZolam Mary Ramoss) 1 MG tablet Take 1 mg by mouth daily as needed for Anxiety. Historical Provider, MD   Cholecalciferol (VITAMIN D3) 50 MCG (2000 UT) CAPS Take 1 capsule by mouth daily     Historical Provider, MD         This is a 45 y.o.morbidly obese male who is pleasant, cooperative, alert and oriented x3, in no acute distress. Heart: Heart sounds are normal.  HR 79 regular rate and rhythm without murmur, gallop or rub.    Lungs: Normal respiratory effort with equal expansion, good air exchange, unlabored and clear to auscultation without wheezes or rales bilaterally   Abdomen: obese, soft, round, nontender, nondistended with bowel sounds. Labs:  No results for input(s): HGB, HCT, WBC, MCV, PLT, NA, K, CL, CO2, BUN, CREATININE, GLUCOSE, INR, PROTIME, APTT, AST, ALT, LABALBU, HCG in the last 720 hours. Recent Labs     09/21/20  1030   COVID19 Not Detected       Iram Nam  APRN, ANP-BC  Electronically signed 10/15/2020 at 8:56 AM        Christian Caraballo MD    Physician    Specialty:  Orthopedic Surgery    Progress Notes    Signed    Encounter Date:  10/12/2020          Related encounter: Office Visit from 10/12/2020 in [facility]          Signed        Expand All Collapse All       [facility]  [address] [facility] [address]  Dept: [phone]     Ambulatory Orthopedic Postoperative Visit     Preoperative Diagnosis:   1. Left ankle retained hardware in the tibia and fibula status post attempted ankle fusion  2. History of open ankle fracture dislocation  3. Vitamin D deficiency  4. Body mass index is 40.21 kg/m². Postoperative Diagnosis:   1. Same      Procedures Performed:   (9/25/2020)  1. Left ankle removal of hardware via 2 separate incisions        SUBJECTIVE:     The patient returns for routine check 2 weeks post op from the above stated procedure. Reports doing well overall, reports improved pain, denies wound drainage/issues, fevers/chills/night sweats, calf swelling/pain, chest pain, shortness of breath. No complaints.         OBJECTIVE:  Temp 97.9 °F (36.6 °C)   Resp 12   Ht 5' 6\" (1.676 m)   Wt 249 lb (112.9 kg)   BMI 40.19 kg/m²    NAD, resting comfortably  Incisions clean/dry/intact, no erythema/dehiscence/drainage  Sensation to light touch grossly intact throughout   Can wiggle toes and plantarflex/dorsiflex foot appropriately  Toes warm and well perfused  Compartments of foot/leg soft and compressible  No calf swelling/tenderness  No signs of infection        RADIOLOGY:   10/12/2020 No new radiology images today. Prior images reviewed for reference. ASSESSMENT AND PLAN:      2 weeks s/p above, doing well overall          [x]? Sutures were removed and steri-strips applied             []?  Physical Therapy/Home exercises                       -    []? Compression socks     Immobilization:      []? Cast                     [x]? CAM boot              []?  Comfortable shoe    []? Other:        []? Information has been provided on routine antibiotic prophylaxis recommendations        []? Avoid the routine use of NSAIDs        [x]? Avoid strenuous activity/pain provoking maneuvers and high-impact repetitive exercises     We spoke about the risks/benefits/alternatives to a surgical intervention. They understand that the risks of surgery may include but are not limited to pain, infection, bleeding, blood clot, damage to soft tissue/vessel/nerve, future surgery, scarring/stiffness, decreased strength/weakness, cosmetic deformity, neuroma/neuritis/phantom pains, delayed soft tissue/bone healing, nonunion, malunion, failure of hardware/fixation/surgery, iatrogenic fracture/dislocation, damage to bone/joint(s), worsening of condition, recurrence, limb length discrepancy, avascular necrosis of bone, neurovascular compromise/compartment syndrome, tourniquet complications, failure of surgery, dissatisfaction with outcome, loss of limb, stroke, heart attack, pulmonary embolus, mental status change, anesthesia risks/reaction, and even death. They expressed verbal understanding of these risks and wish to proceed with surgical intervention. All questions were answered. No guarantees were made or implied. Informed consent was obtained.       The patient was counseled about the risks of jarett Covid-19 during the perioperative period and any recovery window from their procedure. The patient was made aware that jarett Covid-19 may worsen their prognosis for recovering from their procedure and lend to a higher morbidity and/or mortality risk. All material risks, benefits, and reasonable alternatives including postponing the procedure were discussed. The patient does wish to proceed with their procedure at this time. We also had a discussion about the risk of blood clot and thromboembolic events. The patient understands that there is an increased risk with surgery/immobilization, and understands nothing will completely eliminate the risk of DVT/PE's, and that any prophylactic medication does not substitute for early mobilization. Given his risk profile, I have recommended the following strategy to decrease the risk of blood clots, and the patient agrees and wishes to proceed:  Aspirin 325 mg PO q Day. All questions were answered and the patient agrees with the above plan. The patient will return to clinic in postoperatively        No follow-ups on file. Encounter Medications    No orders of the defined types were placed in this encounter. No orders of the defined types were placed in this encounter. Milvia Sierra MD  Orthopedic Surgery           Please excuse any typos/errors, as this note was created with the assistance of voice recognition software. While intending to generate a document that actually reflects the content of the visit, the document can still have some errors including those of syntax and sound-a-like substitutions which may escape proof reading. In such instances, actual meaning can be extrapolated by context. I spoke to the patient in the preoperative area, and the operative site was identified, verified and marked. The procedure was verified. I reviewed the risks, benefits, and alternatives to the procedure. No guarantees were made.  I have

## 2020-10-16 ENCOUNTER — CLINICAL DOCUMENTATION (OUTPATIENT)
Dept: ORTHOPEDIC SURGERY | Age: 40
End: 2020-10-16

## 2020-10-28 ENCOUNTER — OFFICE VISIT (OUTPATIENT)
Dept: ORTHOPEDIC SURGERY | Age: 40
End: 2020-10-28

## 2020-10-28 VITALS — BODY MASS INDEX: 37.77 KG/M2 | WEIGHT: 235 LBS | TEMPERATURE: 97.2 F | HEIGHT: 66 IN | RESPIRATION RATE: 12 BRPM

## 2020-10-28 PROCEDURE — 99024 POSTOP FOLLOW-UP VISIT: CPT | Performed by: ORTHOPAEDIC SURGERY

## 2020-10-28 RX ORDER — DOCUSATE SODIUM 100 MG/1
100 CAPSULE, LIQUID FILLED ORAL 2 TIMES DAILY PRN
Qty: 20 CAPSULE | Refills: 0 | Status: SHIPPED | OUTPATIENT
Start: 2020-10-28 | End: 2021-04-30 | Stop reason: ALTCHOICE

## 2020-10-28 NOTE — PROGRESS NOTES
[facility]  [address] [address]  Dept: [phone]    Ambulatory Orthopedic Postoperative Visit     Preoperative Diagnosis:   1. 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 Left fibula malunion  3. Left ankle equinus contracture  4. History of open ankle fracture dislocation  5. Vitamin D deficiency  6. Body mass index is 37.93 kg/m².     Postoperative Diagnosis:   1. Same as above     Procedures Performed:  (10/15/2020)  1. 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 Left fibula open treatment of fibular malunion (with osteotomy and excision of bone)  3. Left percutaneous tendoachilles lengthening, performed through separate incision      SUBJECTIVE:     The patient returns 2 weeks post op from the above stated procedure. Reports doing well overall, reports improved pain, denies wound drainage/issues, fevers/chills/night sweats, calf swelling/pain, chest pain, shortness of breath. No complaints. OBJECTIVE:  Ht 5' 6\" (1.676 m)   Wt 235 lb (106.6 kg)   BMI 37.93 kg/m²    NAD, resting comfortably  Incisions clean/dry/intact, no erythema/dehiscence/drainage  Sensation to light touch grossly intact throughout   Can wiggle toes and plantarflex/dorsiflex foot appropriately  Toes warm and well perfused  Compartments of foot/leg soft and compressible  No calf swelling/tenderness  No signs of infection      RADIOLOGY:   10/28/2020 No new radiology images today. Prior images reviewed for reference. ASSESSMENT AND PLAN:     2 weeks s/p above, doing well overall        [x]  Sutures were removed and steri-strips applied          Precautions:  nonweight bearing x 8 weeks anticipated on the Left lower extremity             -             []?  Physical Therapy/Home exercises        Immobilization:      [x]?   Splint/Cast               []?  CAM boot []?  Comfortable shoe    []? Other:                DVT ppx:   [x]? Early mobilization              [x]? Medication as prescribed (Aspirin 325 mg PO q Day)                     []?  No chemical ppx needed; mechanical only             -     Pain control:  Medication as prescribed (dosing and quantity) indicated for acute postoperative pain control             -     Special concerns:       [x]? Vitamin D -- due to relatively low level, the patient will take supplementation                           []?  Prealbumin -- due to relatively low level, the patient will take supplementation    []? Glucose control            []? Tobacco cessation     []? Pin pulling in office at 6 weeks anticipated    []? Follow up on pathology results    []? Follow up on culture results    [x]? Bone stimulator          [x]? Avoid strenuous activity/pain provoking maneuvers and high-impact repetitive exercises    -20 tabs of docusate ordered today. He does report he has been able to have bowel movements every day, that he normally goes twice a day. All questions were answered and the patient agrees with the above plan. The patient will return to clinic in 4 weeks with left foot and ankle x-rays out of plaster simulated weightbearing         Return in about 4 weeks (around 11/25/2020). No orders of the defined types were placed in this encounter. No orders of the defined types were placed in this encounter. Jose Brennan MD  Orthopedic Surgery        Please excuse any typos/errors, as this note was created with the assistance of voice recognition software. While intending to generate a document that actually reflects the content of the visit, the document can still have some errors including those of syntax and sound-a-like substitutions which may escape proof reading. In such instances, actual meaning can be extrapolated by context.

## 2020-10-28 NOTE — LETTER
[address] [place] [address] and Sports Medicine  [address] [address]  Phone: [phone]  Fax: [phone]    Meera Tellez MD        October 28, 2020     Patient: Nava Zamarripa   YOB: 1980   Date of Visit: 10/28/2020       To Whom it May Concern:    Pilar Mayfield was seen in my clinic on 10/28/2020. He was brought to this appointment by his [employer]. John Ferguson is unable to drive at this time following orthopedic surgery. If you have any questions or concerns, please don't hesitate to call.     Sincerely,     The office of Tamia Bland MD

## 2020-12-10 ENCOUNTER — TELEPHONE (OUTPATIENT)
Dept: ORTHOPEDIC SURGERY | Age: 40
End: 2020-12-10

## 2020-12-10 NOTE — TELEPHONE ENCOUNTER
FYI  Procedures Performed:  (10/15/2020)  1. 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 Left fibula open treatment of fibular malunion (with osteotomy and excision of bone)  3.  Left percutaneous tendoachilles lengthening, performed through separate incision  10/28 Last appt 1st post op   11/06/cancel appt  11/25 No show   12/18 Can  Next appt 01/06

## 2020-12-18 ENCOUNTER — OFFICE VISIT (OUTPATIENT)
Dept: ORTHOPEDIC SURGERY | Age: 40
End: 2020-12-18

## 2020-12-18 VITALS
HEART RATE: 94 BPM | BODY MASS INDEX: 37.77 KG/M2 | HEIGHT: 66 IN | TEMPERATURE: 97.5 F | RESPIRATION RATE: 15 BRPM | WEIGHT: 235 LBS

## 2020-12-18 DIAGNOSIS — Z96.9 RETAINED ORTHOPEDIC HARDWARE: Primary | ICD-10-CM

## 2020-12-18 DIAGNOSIS — M19.272 SECONDARY LOCALIZED OSTEOARTHROSIS OF LEFT ANKLE AND FOOT: ICD-10-CM

## 2020-12-18 PROCEDURE — 99024 POSTOP FOLLOW-UP VISIT: CPT | Performed by: ORTHOPAEDIC SURGERY

## 2020-12-18 NOTE — PROGRESS NOTES
[facility]  [address] [address]  Dept: [phone]    Ambulatory Orthopedic Postoperative Visit     Preoperative Diagnosis:   1. 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 Left fibula malunion  3. Left ankle equinus contracture  4. History of open ankle fracture dislocation  5. Vitamin D deficiency  6. Body mass index is 37.93 kg/m².     Postoperative Diagnosis:   1. Same as above     Procedures Performed:  (10/15/2020)  1. 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 Left fibula open treatment of fibular malunion (with osteotomy and excision of bone)  3. Left percutaneous tendoachilles lengthening, performed through separate incision      SUBJECTIVE:     The patient returns 9 weeks post op from the above stated procedure. Reports doing well overall, reports improved pain, denies wound drainage/issues, fevers/chills/night sweats, calf swelling/pain, chest pain, shortness of breath. No complaints. Since being seen here last, the patient did miss his 6-week postop appointment (I called the patient at home earlier this week and encouraged him to return to the office; he explained that there must of been a miscommunication as he believes his next appointment was in January). He reports he has been putting some weight on his foot, but has not been ambulating.      OBJECTIVE:  Pulse 94   Temp 97.5 °F (36.4 °C)   Resp 15   Ht 5' 6\" (1.676 m)   Wt 235 lb (106.6 kg)   BMI 37.93 kg/m²    NAD, resting comfortably  Incisions clean/dry/intact, no erythema/dehiscence/drainage  Sensation to light touch grossly intact throughout   Can wiggle toes and plantarflex/dorsiflex foot appropriately  Toes warm and well perfused  Compartments of foot/leg soft and compressible  No calf swelling/tenderness  No signs of infection      RADIOLOGY: 12/18/2020 FINDINGS:  Three views (AP, Mortise, and Lateral) of the left ankle and three views (AP, Oblique, Lateral) of the left foot were obtained in the office today and reviewed, revealing no acute fracture, dislocation, or radioopaque foreign body/tumor. Intact hardware across the tibiotalar joint without evidence of loosening. No interval displacement. Interval healing noted. IMPRESSION: Intact hardware as above. Electronically signed by Cintia Hernandez MD      ASSESSMENT AND PLAN:     9 weeks s/p above, doing well overall        []  Sutures were removed and steri-strips applied          Precautions:  nonweight bearing x 8 weeks anticipated on the Left lower extremity--completed             -Begin weightbearing advance, information was provided on this today             [x]? Physical Therapy/Home exercises        Immobilization:      []? Splint/Cast               [x]? CAM boot                    []?  Comfortable shoe    []? Other:                DVT ppx:   [x]? Early mobilization              []?  Medication as prescribed (Aspirin 325 mg PO q Day)                     [x]? No chemical ppx needed; mechanical only             -     Pain control:  Medication as prescribed (dosing and quantity) indicated for acute postoperative pain control             -     Special concerns:       [x]? Vitamin D -- due to relatively low level, the patient will take supplementation                           []?  Prealbumin -- due to relatively low level, the patient will take supplementation    []? Glucose control            []? Tobacco cessation     []? Pin pulling in office at 6 weeks anticipated    []? Follow up on pathology results    []? Follow up on culture results    [x]? Bone stimulator          [x]?   Avoid strenuous activity/pain provoking maneuvers and high-impact repetitive exercises All questions were answered and the patient agrees with the above plan. The patient will return to clinic in 6 weeks with repeat left foot and ankle x-rays as well as bilateral Lolly Hang x-rays         Return in about 6 weeks (around 1/29/2021). No orders of the defined types were placed in this encounter. Orders Placed This Encounter   Procedures    DME Order for (Specify) as OP     DME: compression stockings (20-30 mm Hg)  Routine, External, Referral By - [employer]ables, Qty-1, Supply Name: Compression Stockings Prognosis: good Estimated length of need: 80         Radha Dan MD  Orthopedic Surgery        Please excuse any typos/errors, as this note was created with the assistance of voice recognition software. While intending to generate a document that actually reflects the content of the visit, the document can still have some errors including those of syntax and sound-a-like substitutions which may escape proof reading. In such instances, actual meaning can be extrapolated by context.

## 2020-12-18 NOTE — LETTER
[address] [place] [address] and [address] [address]  Phone: [phone]  Fax: [phone]    Ed Shepard MD        December 18, 2020     Patient: Dalia Choi   YOB: 1980   Date of Visit: 12/18/2020       To Whom It May Concern: It is my medical opinion that Trena Dupont should remain out of work until February 1, 2021. If you have any questions or concerns, please don't hesitate to call.     Sincerely,        Ed Shepard MD

## 2021-01-22 ENCOUNTER — HOSPITAL ENCOUNTER (OUTPATIENT)
Dept: PHYSICAL THERAPY | Age: 41
Setting detail: THERAPIES SERIES
Discharge: HOME | End: 2021-01-22

## 2021-01-22 PROCEDURE — 97162 PT EVAL MOD COMPLEX 30 MIN: CPT

## 2021-01-22 PROCEDURE — 97110 THERAPEUTIC EXERCISES: CPT

## 2021-01-22 NOTE — CONSULTS
[x] [facility]  [address]  [phone]  F: [phone]         Physical Therapy Lower Extremity Evaluation    Date:  2021  Patient: Tashia Sheridan  : 1980  MRN: [id number]  Physician: Meg Pritchard MD     Insurance: BC/BS (limit 30 Rx, 30% co-pay)  Medical Diagnosis: Retained orthopedic hardware Z96.9; Secondary localized osteoarthrosis of left ankle and foot M19.272; s/p L ankle fusion     Rehab Codes: M25.572, M25.672, M25.675, M25.662, R26.2, R26.89, M25.472, M25.475  Onset date: 10/15/2020  Next 's appt.: 2021      Subjective:   HPI/CC: Pt w/h/o open L fibula fracture , Pt has had 7 surgeries total, including attempted fusion w/valgus non-union. Pt had hardware removed 2020, then 10/15/2020 had tibiotalar arthrodesis, osteotomy L fibula malunion, and tendoachilles lengthening. Pt used knee scooter, was NWB until 2020. Pt reported to Rx w/out assistive device, wearing CAM boot. Pt reports has 1 crutch uses off and on as needed. Pt reports ankle cont to be painful, points medial, ant and lat ankle joint. Pain increases w/walking, going up steps, getting up early in morning, walking distances, riding in car too long, and driving (due to position of leg), up to 10/10. Pt reports he has to \"bump up stairs\" to his second floor seated as they are narrow steps. Pt currently using bone stimulator daily for 20 min. Pain decreases w/pain meds, sitting down, and elevating leg. Pt has not tried using ice or heat. Warm water from bath helps with pain and swelling.        PMHx: [] Unremarkable [] Diabetes [] HTN  [] Pacemaker   [] MI/Heart Problems [] Cancer [] Arthritis   [x] Other: seizure, brain cyst, severe anxiety w/panic; bone graft from L hip for L ankle              [x] Refer to full medical chart  In EPIC       Comorbidities:   [] Obesity [] Dialysis  [x] N/A   [] Asthma/COPD [] Dementia [] Other: [] Stroke [] Sleep apnea [] Other:   [] Vascular disease [] Rheumatic disease [] Other:     Tests: [x] X-Ray: [] MRI:  [] Other:    Medications: [x] Refer to full medical record [] None [] Other:  Allergies:      [x] Refer to full medical record [x] None [] Other:    Function:  Hand Dominance  [x] Right  [] Left  Patient lives with: fiance   In what type of home []  One story   [x] Two story   [] Split level   Number of stairs to enter 3 steps   With handrail on the []  Right to enter   [x] Left to enter   Bathroom has a []  Tub only  [x] Tub/shower combo   [] Walk in shower    []  Grab bars   Washing machine is on []  Main level   [] Second level   [x] Basement   Employer [employer]   Job Status []  Normal duty   [] Light duty    [x] Off due to condition-since April 2019  []  Retired   [] Not employed   [] Disability  [] Other:  [x]  Return to work: unknown   Work activities/duties , on feet 8 hours day, lifting  lbs       ADL/IADL Previous level of function Current level of function Who currently assists the patient with task   Bathing  [x] Independent  [] Assist [] Independent  [x] Assist Fiance helps, has to stand on one leg    Dress/grooming [x] Independent  [] Assist [x] Independent  [] Assist    Transfer/mobility [x] Independent  [] Assist [] Independent  [x] Assist Fiance helps w/steps   Feeding [x] Independent  [] Assist [x] Independent  [] Assist    Toileting [x] Independent  [] Assist [] Independent  [x] Assist Uses urinal due to difficulty w/transferring to toilet   Driving [x] Independent  [] Assist [x] Independent  [] Assist    Housekeeping [x] Independent  [] Assist [] Independent  [x] Assist Fiance does laundry as in basement   Grocery shop/meal prep [x] Independent  [] Assist [] Independent  [x] Assist      Gait Prior level of function Current level of function    [x] Independent  [] Assist [x] Independent  [] Assist   Device: [x] Independent [x] Independent    [] Straight Cane [] Quad cane [] Straight Cane [] Quad cane    [] Standard walker [] Rolling walker   [] 4 wheeled walker [] Standard walker [] Rolling walker   [] 4 wheeled walker    [] Wheelchair [x] 1 crutch     Pain:  [x] Yes  [] No Location: L foot, ankle  Pain Rating: (0-10 scale) 10/10  Pain altered Tx:  [] Yes  [x] No  Action:    Symptoms:  [x] Improving-as long as takes pain meds [] Worsening [] Same    Sleep: [] OK    [x] Disturbed-wakes him up if moves    Objective:    ROM  ° A/P STRENGTH TESTS (+/-) Left Right Not Tested    Left Right Left Right Ant. Drawer   []   Hip Flex   3+ 4- Post. Drawer   []   Ext   3+ 4 Lachmans   []   ER     Valgus Stress   []   IR     Varus Stress   []   ABD   3+  Pablos   []   ADD     Apleys Comp.   []   Knee Flex 118°  3+ 4+ Apleys Dist.   []   Ext 28°  3+ 4 Hip Scouring   []   Ankle DF 1°p 8° 2-p 5 MICHELLEs   []   PF 4°p 33° 2-p 5 Piriformis   []   INV 17° 49 2- 5 Caydens   []   EVER 0-1° 15° 2- 5 Talor Tilt   []        Pat-Fem Grind   []   p=pain      OBSERVATION No Deficit Deficit Not Tested Comments   Posture       Forward Head [] [x] []    Rounded Shoulders [] [x] []    Slumped Sitting [] [x] []    Palpation [] [x] []    Sensation [] [] [x]    Edema [] [x] [] @ lat malleoli L 34.4 cm, R 28.8 cm; mid arch L 29.0 cm, R 29.4 cm   Neurological [] [] [x]    Patellar Mobility [] [] [x]    Patellar Orientation [] [] [x]    Gait [] [x] [] Analysis: painful gait, decreased WB L, decreased stance time L           Functional Test: LEFS Score: 91% functionally impaired     Comments: Pt arrived 10 min late for appt, was to have paperwork completed, did not. Assessment:  Patient would benefit from skilled physical therapy services in order to: decrease ankel pain, increase L LE strength and ankle ROM, and improve walking and steps, in order to allow Pt to return to work. Problems:    [x] ? Pain: L ankle 10/10   [x] ? ROM: L ankle, L knee ext  [x] ? Strength: L LE   [x] ?  Function: LEFS 91% loss of LE function   [] Other:       STG: (to be met in 10 treatments)  1. ? Pain: L ankle 6/10 average pain, 8/10 at worst  2. ? ROM: L DF 4°, PF 10°, Inv 20°, Ever 4°, L knee ext 14°  3. ? Strength: L hip 4-/5, L knee 4-/5, L ankle 3+/5  4. ? Function: LEFS 62% loss of LE function  5. Pt amb indep w/CAM boot for community distances  6. Pt ascend/descend 1 flight steps indep 1 step at a time  7. Patient to be independent with home exercise program as demonstrated by performance with correct form without cues. LTG: (to be met in 18 treatments)  1. ? Pain: L ankle 3/10 average pain, 6/10 at worst   2. ? ROM: L DF 6°, PF 16°, Inv 26°, Ever 6°, L knee ext 6°  3. ? Strength: L hip 4/5, L knee 4+/5, L ankle 4-/5  4. Pt ascend/descend 1 flight steps indep step over step  5. ? Function: LEFS 30% loss of LE function   6. Pt amb indep w/normal gait pattern for community distances (when Dr Dotson Math to go without CAM boot)                   Patient goals: \"Get back to work\"    Rehab Potential:  [x] Good  [] Fair  [] Poor   Suggested Professional Referral:  [x] No  [] Yes:  Barriers to Goal Achievement:  [x] No  [] Yes:  Domestic Concerns:  [x] No  [] Yes:    Pt. Education:  [x] Plans/Goals, Risks/Benefits discussed  [x] Home exercise program    Method of Education: [x] Verbal  [x] Demo  [x] Written  Comprehension of Education:  [x] Verbalizes understanding. [] Demonstrates understanding. [] Needs Review. [] Demonstrates/verbalizes understanding of HEP/Ed previously given.     Treatment Plan:  [x] Therapeutic Exercise   11446  [] Iontophoresis: 4 mg/mL Dexamethasone Sodium Phosphate  mAmin  54224   [x] Therapeutic Activity  04297 [x] Vasopneumatic cold with compression  58148    [x] Gait Training   95425 [x] Ultrasound   88452   [] Neuromuscular Re-education  04703 [x] Electrical Stimulation Unattended  23140   [x] Manual Therapy  49914 [] Electrical Stimulation Attended  80844   [x] Instruction in HEP  [] Lumbar/Cervical Traction  [id number]   [] Aquatic Therapy   [id number] [x] Cold/hotpack    [] Massage   [id number]      [] Dry Needling, 1 or 2 muscles  10363   [] Biofeedback, first 15 minutes   75441  [] Biofeedback, additional 15 minutes   73382 [] Dry Needling, 3 or more muscles  53559     []  Medication allergies reviewed for use of    Dexamethasone Sodium Phosphate 4mg/ml     with iontophoresis treatments. Pt is not allergic. Frequency:  2 x/week for 18 visits        Todays Treatment:  Modalities:   Precautions: from 12/18 order (in CAM boot when ambulating) 50% Partial Wb'ing x 2 weeks, then 75% Partial Wb'ing x 2 weeks, then WBAT if pt is comfortable. Exercises:  Exercise Reps/ Time Weight/ Level Comments         Ankle pumps 10x     Ankle inver/ever 10x     Ankle circles  10x ea  Cw, ccw   Toe curls  15x     Ankle alphabet       Other: Educated Pt in ex for HEP, issued handout. Specific Instructions for next treatment: Progress ankle ex, add toe ex, HS stretch, knee ext stretches, hip, knee strengthening; trial game ready, if no benefit with game ready try HP; review amb w/1 crutch (if Pt cont to use)  Per Order: (in CAM boot when ambulating) 50% Partial Wb'ing x 2 weeks, then 75% Partial Wb'ing x 2 weeks, then WBAT if pt is comfortable.   Please evaluate and treat, services as deemed appropriate by the therapist, including:  Gait training, range of motion, light strengthening as tolerated    Evaluation Complexity:  History (Personal factors, comorbidities) [] 0 [] 1-2 [x] 3+   Exam (limitations, restrictions) [] 1-2 [] 3 [x] 4+   Clinical presentation (progression) [] Stable [x] Evolving  [] Unstable   Decision Making [] Low [x] Moderate [] High    [] Low Complexity [x] Moderate Complexity [] High Complexity       Treatment Charges: Mins Units   [x] Evaluation       []  Low       [x]  Moderate       []  High 31 1   []  Modalities     [x]  Ther Exercise 13 1   []  Manual Therapy     []  Ther Activities     []

## 2021-01-26 ENCOUNTER — HOSPITAL ENCOUNTER (OUTPATIENT)
Dept: PHYSICAL THERAPY | Age: 41
Setting detail: THERAPIES SERIES
Discharge: HOME | End: 2021-01-26

## 2021-01-26 PROCEDURE — 97140 MANUAL THERAPY 1/> REGIONS: CPT

## 2021-01-26 PROCEDURE — 97016 VASOPNEUMATIC DEVICE THERAPY: CPT

## 2021-01-26 PROCEDURE — 97110 THERAPEUTIC EXERCISES: CPT

## 2021-01-26 NOTE — FLOWSHEET NOTE
[x] [facility]  [address]  [phone]  F: [phone] [] [address]  [place] [address]   [address]  P: [phone]  F: [phone] [] [place]  [address]  P: [phone]  F: [phone] [] [address]  P: [phone]  F: [phone] [] [address]  [facility]   [address]   [place]: [phone]  F: [phone]      Physical Therapy Daily Treatment Note    Date:  2021  Patient Name:  Wing Quiros    :  1980  MRN: [id number]    Physician: Espinoza Hurtado MD                                    Insurance: BC/BS (limit 30 Rx, 30% co-pay)  Medical Diagnosis: Retained orthopedic hardware Z96.9; Secondary localized osteoarthrosis of left ankle and foot M19.272; s/p L ankle fusion                  Rehab Codes: M25.572, M25.672, M25.675, M25.662, R26.2, R26.89, M25.472, M25.475  Onset date: 10/15/2020                     Next 's appt.: 2021    Visit# / total visits:       Cancels/No Shows: 0/1    Subjective:    Pain:  [x]? Yes  []? No  Location: L foot, ankle            Pain Rating: (0-10 scale) 9/10  Pain altered Tx:  []? Yes  [x]? No  Action:  Comments: Patient arrives this date with camboot and crutch. Patient states he has 9/10 pain upon arrival today. Says he did not take meds before coming to therapy today. Objective:  Modalities:  Vaso to L ankle x 15 mins, 38°, med compression, supine with ankle elevated    Precautions:  from  order (in CAM boot when ambulating) 50% Partial Wb'ing x 2 weeks, then 75% Partial Wb'ing x 2 weeks, then WBAT if pt is comfortable.   Exercises:  Exercise Reps/ Time Weight/ Level Comments   Nustep       Standing:      3 way hip 10x ea     Weight shifting  x15     HS stretch  20\"x3 ea           Seated: Ankle pumps  x10     Ankle inver/ever x10     Ankle circles  x10  Cw/Ccw   Toe curls  x15     Ankle alphabet  x1     BAPS  x10  Cw/Ccw, plantar/dorsi, inver/leander   LAQ x15     Toe yoga  x10     Calf stretch  20\"x3  Long seated   Domers  x10           Supine:      SLR  x10     SAQ x10                             Other: PROM plantar/dorsi flexion, inver,leander, CW, CCW for 8 minutes. Treatment Charges: Mins Units   []  Modalities     [x]  Ther Exercise 36 2   [x]  Manual Therapy 8 1   []  Ther Activities     []  Aquatics     [x]  Vasocompression 15 1   []  Other     Total Treatment time 59 4       Assessment: [x] Progressing toward goals. Initiated therapeutic exercise this date as noted above with fair tolerance from patient. Patient needed MIN visual/verbal cues throughout exercises to improve technique with good carryover noted. Pt unable to get full ROM thus delivered manual therapy to L ankle as noted above to increase ROM with patient noting some discomfort however, states \"it's not that bad\" Ended tx with vaso to left ankle as noted above to decrease pain and swelling with positive results. No adverse response noted post tx.    [] No change. [] Other:  [x] Patient would continue to benefit from skilled physical therapy services in order to: decrease ankel pain, increase L LE strength and ankle ROM, and improve walking and steps, in order to allow Pt  to return to work. STG/LTG STG: (to be met in 10 treatments)  1. ? Pain: L ankle 6/10 average pain, 8/10 at worst  2. ? ROM: L DF 4°, PF 10°, Inv 20°, Ever 4°, L knee ext 14°  3. ? Strength: L hip 4-/5, L knee 4-/5, L ankle 3+/5  4. ? Function: LEFS 62% loss of LE function  5. Pt amb indep w/CAM boot for community distances  6. Pt ascend/descend 1 flight steps indep 1 step at a time  7.  Patient to be independent with home exercise program as demonstrated by performance with correct form without cues.     LTG: (to be met in 18 treatments)  1. ? Pain: L ankle 3/10 average pain, 6/10 at worst   2. ? ROM: L DF 6°, PF 16°, Inv 26°, Ever 6°, L knee ext 6°  3. ? Strength: L hip 4/5, L knee 4+/5, L ankle 4-/5  4. Pt ascend/descend 1 flight steps indep step over step  5. ? Function: LEFS 30% loss of LE function   6. Pt amb indep w/normal gait pattern for community distances (when Dr Dumont Curls to go without CAM boot)                    Patient goals: \"Get back to work\"       Pt. Education:  [x] Yes  [] No  [x] Reviewed Prior HEP/Ed  Method of Education: [x] Verbal  [x] Demo  [] Written  Comprehension of Education:  [x] Verbalizes understanding. [x] Demonstrates understanding. [] Needs review. [x] Demonstrates/verbalizes HEP/Ed previously given. Plan: [x] Continue current frequency toward long and short term goals. [x] Specific Instructions for subsequent treatments: Progress ankle ex, add toe ex, HS stretch, knee ext stretches, hip, knee strengthening; review amb w/1 crutch (if Pt cont to use)    Frequency:  2 x/week for 18 visits       Time In:9:06AM            Time Out: 10:10AM    Electronically signed by:  Anya Amaro    Patient treated and note written by Anya Amaro, Student Physical Therapist Assistant under supervision of Nathan Villalpando PTA.

## 2021-01-28 ENCOUNTER — HOSPITAL ENCOUNTER (OUTPATIENT)
Dept: PHYSICAL THERAPY | Age: 41
Setting detail: THERAPIES SERIES
Discharge: HOME | End: 2021-01-28

## 2021-01-28 PROCEDURE — 97140 MANUAL THERAPY 1/> REGIONS: CPT

## 2021-01-28 PROCEDURE — 97016 VASOPNEUMATIC DEVICE THERAPY: CPT

## 2021-01-28 PROCEDURE — 97110 THERAPEUTIC EXERCISES: CPT

## 2021-01-28 NOTE — FLOWSHEET NOTE
[x] [facility]  [address]  [phone]  F: [phone] [] [address]  [place] [address]   [address]  P: [phone]  F: [phone] [] [place]  [address]  P: [phone]  F: [phone] [] [address]  P: [phone]  F: [phone] [] [address]  [facility]   [address]   [place]: [phone]  F: [phone]      Physical Therapy Daily Treatment Note    Date:  2021  Patient Name:  Keith Ford    :  1980  MRN: [id number]    Physician: Kadi Eaton MD                                    Insurance: BC/BS (limit 30 Rx, 30% co-pay)  Medical Diagnosis: Retained orthopedic hardware Z96.9; Secondary localized osteoarthrosis of left ankle and foot M19.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 Yes  []? No  Location: L foot, ankle            Pain Rating: (0-10 scale) 10/10  Pain altered Tx:  []? Yes  [x]? No  Action:  Comments: Patient states his ankle is \"sore\" upon arrival, however notes 10/10 pain. Patient arrives ambulating with no crutches, states they are \"outside\" and he has only been using them every now and then. Reports he has been adhering to some exercises at home, mostly ROM.      Objective:  Modalities:  Vaso to L ankle x 15 mins, 38°, med compression, supine with ankle elevated    Precautions:  from  order (in CAM boot when ambulating) 50% Partial Wb'ing x 2 weeks, then 75% Partial Wb'ing x 2 weeks, then WBAT if pt is comfortable. Exercises:  Exercise Reps/ Time Weight/ Level Comments   Nustep  5m L1 CAM boot on          Standing:   Held standing 1/28   3 way hip  10x ea     Weight shifting  x15     HS stretch  20\"x3 ea           Seated:      Heel/toe raises 2x10  Limited ROM heel raise   Ankle inver/ever x10     Ankle circles  x10  Cw/Ccw   Toe curls  2x10  W/ towel   Ankle alphabet  x1     BAPS  x10 L1 Cw/Ccw, plantar/dorsi, inver/leander -- manual assist    LAQ 2x10     Toe yoga  x10     Calf stretch  20\"x3  Long seated w/ towel   Domers  x10           Supine:   Dilan   SLR  2x10     SAQ 2x10                             Other: PROM plantar/dorsi flexion, inver,leander, CW, CCW, end range PF stretch for 10 minutes. Treatment Charges: Mins Units   []  Modalities     [x]  Ther Exercise 35 2   [x]  Manual Therapy 10 1   []  Ther Activities     []  Aquatics     [x]  Vasocompression 15 1   []  Other     Total Treatment time 60 4       Assessment: [] Progressing toward goals. Continued with focus on seated and supine mat exercises for ROM this date due to increased pain noted at arrival. Increased majority of reps to 20x, broken into sets for rest if needed with no issues or increased pain noted from patient. Continued with manual therapy as noted with focus on ROM in all planes as well as end range plantarflexion which is most limited with minimal discomfort noted. Ended with vaso for symptom and swelling reduction with positive result. [] No change. [] Other:  [x] Patient would continue to benefit from skilled physical therapy services in order to: decrease ankle pain, increase L LE strength and ankle ROM, and improve walking and steps, in order to allow Pt  to return to work.       STG/LTG STG: (to be met in 10 treatments)  1. ? Pain: L ankle 6/10 average pain, 8/10 at worst  2. ? ROM: L DF 4°, PF 10°, Inv 20°, Ever 4°, L knee ext 14°  3. ? Strength: L hip 4-/5, L knee 4-/5, L ankle 3+/5  4. ? Function: LEFS 62% loss of LE function  5. Pt amb indep w/CAM boot for community distances  6. Pt ascend/descend 1 flight steps indep 1 step at a time  7. Patient to be independent with home exercise program as demonstrated by performance with correct form without cues.     LTG: (to be met in 18 treatments)  1. ? Pain: L ankle 3/10 average pain, 6/10 at worst   2. ? ROM: L DF 6°, PF 16°, Inv 26°, Ever 6°, L knee ext 6°  3. ? Strength: L hip 4/5, L knee 4+/5, L ankle 4-/5  4. Pt ascend/descend 1 flight steps indep step over step  5. ? Function: LEFS 30% loss of LE function   6. Pt amb indep w/normal gait pattern for community distances (when Dr Margy Garciaers to go without CAM boot)                    Patient goals: \"Get back to work\"       Pt. Education:  [x] Yes  [] No  [x] Reviewed Prior HEP/Ed  Method of Education: [x] Verbal  [x] Demo  [] Written  Comprehension of Education:  [x] Verbalizes understanding. [x] Demonstrates understanding. [] Needs review. [x] Demonstrates/verbalizes HEP/Ed previously given. Plan: [x] Continue current frequency toward long and short term goals.     [x] Specific Instructions for subsequent treatments: Progress ankle ex, add toe ex, HS stretch, knee ext stretches, hip, knee strengthening; review amb w/1 crutch (if Pt cont to use)    Frequency:  2 x/week for 18 visits       Time In: 9:03AM            Time Out: 10:10AM    Electronically signed by:  Davis Montana PTA

## 2021-02-01 DIAGNOSIS — Z96.9 RETAINED ORTHOPEDIC HARDWARE: Primary | ICD-10-CM

## 2021-02-02 ENCOUNTER — OFFICE VISIT (OUTPATIENT)
Dept: ORTHOPEDIC SURGERY | Age: 41
End: 2021-02-02
Payer: COMMERCIAL

## 2021-02-02 VITALS
TEMPERATURE: 97.3 F | HEART RATE: 75 BPM | HEIGHT: 66 IN | BODY MASS INDEX: 37.77 KG/M2 | WEIGHT: 235 LBS | OXYGEN SATURATION: 98 % | RESPIRATION RATE: 14 BRPM

## 2021-02-02 DIAGNOSIS — M19.272 SECONDARY LOCALIZED OSTEOARTHROSIS OF LEFT ANKLE AND FOOT: Primary | ICD-10-CM

## 2021-02-02 DIAGNOSIS — Z98.1 ARTHRODESIS STATUS: ICD-10-CM

## 2021-02-02 PROCEDURE — 99213 OFFICE O/P EST LOW 20 MIN: CPT | Performed by: ORTHOPAEDIC SURGERY
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
## 2021-02-04 ENCOUNTER — HOSPITAL ENCOUNTER (OUTPATIENT)
Dept: PHYSICAL THERAPY | Age: 41
Setting detail: THERAPIES SERIES
Discharge: HOME | End: 2021-02-04

## 2021-02-04 PROCEDURE — 97110 THERAPEUTIC EXERCISES: CPT

## 2021-02-04 PROCEDURE — 97140 MANUAL THERAPY 1/> REGIONS: CPT

## 2021-02-04 PROCEDURE — 97016 VASOPNEUMATIC DEVICE THERAPY: CPT

## 2021-02-04 NOTE — FLOWSHEET NOTE
[x] [facility] &  Therapy  [address]  [phone]  F: [phone] [] [address]  [facility]   [address]  P: [phone]  F: [phone] [] [place]  [address]  P: [phone]  F: [phone] [] [address]  P: [phone]  F: [phone] [] [address]  [facility]   [address]   [place]: [phone]  F: [phone]      Physical Therapy Daily Treatment Note    Date:  2021  Patient Name:  Fahad Camarena    :  1980  MRN: [id number]    Physician: Anselmo Henley MD                                    Insurance: BC/BS (limit 30 Rx, 30% co-pay)  Medical Diagnosis: Retained orthopedic hardware Z96.9; 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 Yes  []? No  Location: L foot, ankle            Pain Rating: (0-10 scale) 9/10  Pain altered Tx:  []? Yes  [x]? No  Action:  Comments: Patient arrives wearing CAM boot this date, states he has an apt on Monday with his primary and was told to start progressing to a shoe as he is able to tolerate. Patient states it's still \"difficult and painful\" to walk on. Says his pain is 9/10 today with increased swelling from walking long periods yesterday.      Objective:  Modalities:  Vaso to L ankle x 15 mins, 38°, med compression, supine with ankle elevated  2/4  Precautions:  from  order (in CAM boot when ambulating) 50% Partial Wb'ing x 2 weeks, then 75% Partial Wb'ing x 2 weeks, then WBAT if pt is comfortable. Exercises:  Exercise Reps/ Time Weight/ Level Comments   Nustep  5m L1 CAM boot on          Standing:   Held 2/4   3 way hip  10x ea     Weight shifting  x15     HS stretch  20\"x3 ea           Seated:      Heel/toe raises 2x10  Limited ROM heel raise   Ankle inver/ever x10     Ankle circles  x10  Cw/Ccw   Toe curls  2x10  W/ towel   Ankle alphabet  x1     BAPS  x10 L1 Cw/Ccw, plantar/dorsi, inver/leander -- manual assist    LAQ 2x10     Toe yoga  x10     Calf stretch  20\"x3  Long seated w/ towel   Domers  x10           Supine:   Dilan   SLR  2x10     SAQ 2x10                             Other: PROM plantar/dorsi flexion, inver,leander, CW, CCW, end range PF stretch for 10 minutes. Treatment Charges: Mins Units   []  Modalities     [x]  Ther Exercise 35 2   [x]  Manual Therapy 10 1   []  Ther Activities     []  Aquatics     [x]  Vasocompression 15 1   []  Other     Total Treatment time 60 4       Assessment: [] Progressing toward goals. [x] No change. Held standing exercises this date as noted above due to patients increased pain upon arrival. Patient notes inability to correctly perform heel/toe raises due   to hard end feel and increased swelling thus manually assisted patient through plantar/dorsiflexion motion with positive results. Patient continues to be most limited with   plantarflexion. Applied manual therapy as noted above to increase ROM with MIN discomfort noted throughout. Ended tx with vaso for pain and swelling. No adverse   response noted post tx. [x] Other: Instructed patient to bring tennis shoes next session   [x] Patient would continue to benefit from skilled physical therapy services in order to: decrease ankle pain, increase L LE strength and ankle ROM, and improve walking  and steps, in order to allow Pt to return to work.       STG/LTG STG: (to be met in 10 treatments)  1. ? Pain: L ankle 6/10 average pain, 8/10 at worst  2. ? ROM: L DF 4°, PF 10°, Inv 20°, Ever 4°, L knee ext 14°  3. ? Strength: L hip 4-/5, L knee 4-/5, L ankle 3+/5  4. ? Function: LEFS 62% loss of LE function  5. Pt amb indep w/CAM boot for community distances  6. Pt ascend/descend 1 flight steps indep 1 step at a time  7. Patient to be independent with home exercise program as demonstrated by performance with correct form without cues.     LTG: (to be met in 18 treatments)  1. ? Pain: L ankle 3/10 average pain, 6/10 at worst   2. ? ROM: L DF 6°, PF 16°, Inv 26°, Ever 6°, L knee ext 6°  3. ? Strength: L hip 4/5, L knee 4+/5, L ankle 4-/5  4. Pt ascend/descend 1 flight steps indep step over step  5. ? Function: LEFS 30% loss of LE function   6. Pt amb indep w/normal gait pattern for community distances (when Dr Hernandez Rape to go without CAM boot)                    Patient goals: \"Get back to work\"       Pt. Education:  [x] Yes  [] No  [x] Reviewed Prior HEP/Ed  Method of Education: [x] Verbal  [x] Demo  [] Written  Comprehension of Education:  [x] Verbalizes understanding. [x] Demonstrates understanding. [] Needs review. [x] Demonstrates/verbalizes HEP/Ed previously given. Plan: [x] Continue current frequency toward long and short term goals. [x] Specific Instructions for subsequent treatments: Progress ankle ex, add toe ex, HS stretch, knee ext stretches, hip, knee strengthening; review amb w/1 crutch (if Pt cont to use)    Frequency:  2 x/week for 18 visits       Time In: 9:02AM            Time Out: 10:05    Electronically signed by:  Dede Bartlett    Patient treated and note written by Dede Bartlett, Student Physical Therapist Assistant under supervision of Ruperto Corbin PTA.

## 2021-02-09 ENCOUNTER — HOSPITAL ENCOUNTER (OUTPATIENT)
Dept: PHYSICAL THERAPY | Age: 41
Setting detail: THERAPIES SERIES
Discharge: HOME | End: 2021-02-09

## 2021-02-09 PROCEDURE — 97140 MANUAL THERAPY 1/> REGIONS: CPT

## 2021-02-09 PROCEDURE — 97016 VASOPNEUMATIC DEVICE THERAPY: CPT

## 2021-02-09 PROCEDURE — 97110 THERAPEUTIC EXERCISES: CPT

## 2021-02-09 NOTE — FLOWSHEET NOTE
[x] [facility]) [facility] &  Therapy  [address]  [phone]  F: [phone] [] [address]  [facility]   [address]  P: [phone]  F: [phone] [] [address]  [address]  P: [phone]  F: [phone] [] [address]  P: [phone]  F: [phone] [] [address]  [facility]   [address]   [place]: [phone]  F: [phone]      Physical Therapy Daily Treatment Note    Date:  2021  Patient Name:  Meme Mazariegos    :  1980  MRN: [id number]    Physician: Manju Beatty MD                                    Insurance: BC/BS (limit 30 Rx, 30% co-pay)  Medical Diagnosis: Retained orthopedic hardware Z96.9; 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 Yes  []? No  Location: L foot, ankle            Pain Rating: (0-10 scale) 8/10  Pain altered Tx:  []? Yes  [x]? No  Action:  Comments: Patient arrives wearing CAM boot this date however, brought shoe. States he has 8/10 pain due to cold weather. Objective:  Modalities:  Vaso to L ankle x 15 mins, 38°, med compression, supine with ankle elevated  2/9  Precautions:  from  order (in CAM boot when ambulating) 50% Partial Wb'ing x 2 weeks, then 75% Partial Wb'ing x 2 weeks, then WBAT if pt is comfortable.   Exercises:  Exercise Reps/ Time Weight/ Level Comments    Nustep  5m L1 CAM boot on  x          Standing:   Wearing shoe  -- OK[facility] L LE     3 way hip  10x ea   x   Weight shifting  x15  FWD/BWD Pre gait/ Lateral x   HS stretch  20\"x3 ea   x   Heel taps to step  10x 6\" Added 2/9  x   HS curls  10x  Added 2/9 x   Marches  10x  Added 2/9 x                        Seated:       Heel/toe raises 2x10  Limited ROM heel raise x   Ankle inver/ever x10   x   Ankle circles  x10  Cw/Ccw x   Toe curls  2x10  W/ towel x   Ankle alphabet  x1      BAPS  x10 L1 Cw/Ccw, plantar/dorsi, inver/leander -- manual assist     LAQ 2x10   x   Toe yoga  x10   x   Calf stretch  20\"x3  Long seated w/ towel x   Domers  x10             Supine:   Dilan    SLR  2x10   x   SAQ 2x10   x                               Other: PROM plantar/dorsi flexion, inver,leander, CW, CCW, end range PF stretch for 8 minutes. --2/9      Treatment Charges: Mins Units   []  Modalities     [x]  Ther Exercise 35 2   [x]  Manual Therapy 8 1   []  Ther Activities     []  Aquatics     [x]  Vasocompression 15 1   []  Other     Total Treatment time 58 4       Assessment: [x] Progressing toward goals. Initiated additional standing exercises this date as noted above to increase strength and function. Verbal cues needed with weight shifting for decreased speed throughout with good carry over. Demonstration and instruction given with heel taps for correct performance with good understanding however, patient displayed difficulty due to limited ankle ROM. Patient slowly improving with ROM however, continues to be limited thus applied manual stretching. Patient notes MIN discomfort however, states tolerable. Instructed patient on bringing crutch next tx session to initiate gait training. Ended with vaso for pain and swelling with positive results. No adverse response post tx. Will continue to progress as tolerates. [] No change.      [x] Other:   [x] Patient would continue to benefit from skilled physical therapy services in order to: decrease ankle pain, increase L LE strength and ankle ROM, and improve walking  and steps, in order to allow Pt to return to work. STG/LTG STG: (to be met in 10 treatments)  1. ? Pain: L ankle 6/10 average pain, 8/10 at worst  2. ? ROM: L DF 4°, PF 10°, Inv 20°, Ever 4°, L knee ext 14°  3. ? Strength: L hip 4-/5, L knee 4-/5, L ankle 3+/5  4. ? Function: LEFS 62% loss of LE function  5. Pt amb indep w/CAM boot for community distances  6. Pt ascend/descend 1 flight steps indep 1 step at a time  7. Patient to be independent with home exercise program as demonstrated by performance with correct form without cues.     LTG: (to be met in 18 treatments)  1. ? Pain: L ankle 3/10 average pain, 6/10 at worst   2. ? ROM: L DF 6°, PF 16°, Inv 26°, Ever 6°, L knee ext 6°  3. ? Strength: L hip 4/5, L knee 4+/5, L ankle 4-/5  4. Pt ascend/descend 1 flight steps indep step over step  5. ? Function: LEFS 30% loss of LE function   6. Pt amb indep w/normal gait pattern for community distances (when Dr Ubaldo Hintonvel to go without CAM boot)                    Patient goals: \"Get back to work\"       Pt. Education:  [x] Yes  [] No  [] Reviewed Prior HEP/Ed  Method of Education: [x] Verbal  [x] Demo  [] Written  Comprehension of Education:  [x] Verbalizes understanding. [x] Demonstrates understanding. [] Needs review. [] Demonstrates/verbalizes HEP/Ed previously given. Plan: [x] Continue current frequency toward long and short term goals. [x] Specific Instructions for subsequent treatments: Progress ankle ex, knee ext stretches, gait training. Frequency:  2 x/week for 18 visits       Time In: 9:02AM            Time Out: 10:05  Electronically signed by:  Sybil Cunningham    Patient treated and note written by Sybil Cunningham, Student Physical Therapist Assistant under supervision of Harrison Talley PTA.

## 2021-02-11 ENCOUNTER — HOSPITAL ENCOUNTER (OUTPATIENT)
Dept: PHYSICAL THERAPY | Age: 41
Setting detail: THERAPIES SERIES
Discharge: HOME | End: 2021-02-11
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
## 2021-02-16 ENCOUNTER — APPOINTMENT (OUTPATIENT)
Dept: PHYSICAL THERAPY | Age: 41
End: 2021-02-16

## 2021-02-23 ENCOUNTER — HOSPITAL ENCOUNTER (OUTPATIENT)
Dept: PHYSICAL THERAPY | Age: 41
Setting detail: THERAPIES SERIES
Discharge: HOME | End: 2021-02-23

## 2021-02-23 PROCEDURE — 97110 THERAPEUTIC EXERCISES: CPT

## 2021-02-23 PROCEDURE — 97140 MANUAL THERAPY 1/> REGIONS: CPT

## 2021-02-23 NOTE — FLOWSHEET NOTE
[x] [facility]) [facility] &  Therapy  [address]  [phone]  F: [phone] [] [address]  [place] [address]   [address]  P: [phone]  F: [phone] [] [place]  [address]  P: [phone]  F: [phone] [] [address]  P: [phone]  F: [phone] [] [address]  [facility]   [address]   [place]: [phone]  F: [phone]      Physical Therapy Daily Treatment Note    Date:  2021  Patient Name:  Jax Gould    :  1980  MRN: [id number]    Physician: Laura Wick MD                                    Insurance: BC/BS (limit 30 Rx, 30% co-pay)  Medical Diagnosis: Retained orthopedic hardware Z96.9; 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 Yes  []? No  Location: L foot, ankle            Pain Rating: (0-10 scale) 9/10  Pain altered Tx:  []? Yes  [x]? No  Action:  Comments: Patient complains of 9/10 pain upon arrival. Says he just woke up and took pain medication which hasn't kicked in yet. Objective:  Modalities:  Vaso to L ankle x 15 mins, 38°, med compression, supine with ankle elevated  -- Held   Precautions:  from  order (in CAM boot when ambulating) 50% Partial Wb'ing x 2 weeks, then 75% Partial Wb'ing x 2 weeks, then WBAT if pt is comfortable.   Exercises:  Exercise Reps/ Time Weight/ Level Comments    Nustep  5m L1 Wearing shoe  x          Standing:   Wearing shoe  -- [facility] L LE     3 way hip  2x10 2 lbs  Added weight & increased reps 2/23 x   Weight shifting  x15  FWD/BWD Pre gait/ Lateral x   HS stretch  20\"x3 ea   x   Heel taps to step  10x 6\"   x   HS curls  2x10  2 lbs  Added weight & increased reps 2/23  x   Marches  10x 2 lbs  Added weight 2/23 x                        Seated:       Heel/toe raises 2x10  Limited ROM heel raise x   Ankle inver/ever x10   x   Ankle circles  x10  Cw/Ccw x   Toe curls  2x10  W/ towel x   Ankle alphabet  x1      BAPS  x10 L1 Cw/Ccw, plantar/dorsi, inver/leander -- manual assist     LAQ 2x10 2 lbs  Added weight 2/23 x   Toe yoga  x10      Calf stretch  20\"x3  Long seated w/ towel x   Domers  x10      Ankle iso  x10  With towel, PF/DF IV/EV - Added 2/23 x          Supine:   Dilan    SLR  2x10 2 lbs  Added weight 2/23 x   SAQ 2x10 2 lbs  Added weight 2/23 x                               Other: PROM plantar/dorsi flexion, inver,leander, CW, CCW, end range PF stretch for 8 minutes. --2/23      Treatment Charges: Mins Units   []  Modalities     [x]  Ther Exercise 40 2   [x]  Manual Therapy 8 1   []  Ther Activities     []  Aquatics     []  Vasocompression     []  Other     Total Treatment time 48 3       Assessment: [x] Progressing toward goals. Added weight to exercises noted above this date to increase LE strength. MIN verbal cues needed throughout standing exercises to correct technique with good understanding and carryover. Patient with decreased swelling and increased ankle ROM however, continues to be limited with PF/DF thus applied manual stretching focusing on these areas. Patient displayed good tolerance to stretches and ankle exercises, notes MIN discomfort throughout with certain motions. Patient denied vaso application this date due to limited time. Will continue to progress as tolerates. [] No change.      [x] Other:   [x] Patient would continue to benefit from skilled physical therapy services in order to: decrease ankle pain, increase L LE strength and ankle ROM, and improve walking  and steps, in order to allow Pt to return to work. STG/LTG STG: (to be met in 10 treatments)  1. ? Pain: L ankle 6/10 average pain, 8/10 at worst  2. ? ROM: L DF 4°, PF 10°, Inv 20°, Ever 4°, L knee ext 14°  3. ? Strength: L hip 4-/5, L knee 4-/5, L ankle 3+/5  4. ? Function: LEFS 62% loss of LE function  5. Pt amb indep w/CAM boot for community distances  6. Pt ascend/descend 1 flight steps indep 1 step at a time  7. Patient to be independent with home exercise program as demonstrated by performance with correct form without cues.     LTG: (to be met in 18 treatments)  1. ? Pain: L ankle 3/10 average pain, 6/10 at worst   2. ? ROM: L DF 6°, PF 16°, Inv 26°, Ever 6°, L knee ext 6°  3. ? Strength: L hip 4/5, L knee 4+/5, L ankle 4-/5  4. Pt ascend/descend 1 flight steps indep step over step  5. ? Function: LEFS 30% loss of LE function   6. Pt amb indep w/normal gait pattern for community distances (when Dr Rosalie Cordero to go without CAM boot)                    Patient goals: \"Get back to work\"       Pt. Education:  [x] Yes  [] No  [] Reviewed Prior HEP/Ed  Method of Education: [x] Verbal  [x] Demo  [] Written  Comprehension of Education:  [x] Verbalizes understanding. [x] Demonstrates understanding. [] Needs review. [] Demonstrates/verbalizes HEP/Ed previously given. Plan: [x] Continue current frequency toward long and short term goals. [x] Specific Instructions for subsequent treatments: knee ext stretches, gait training. Treadmill, step ups if tolerable. Frequency:  2 x/week for 18 visits       Time In: 8:34AM            Time Out: 9:22AM  Electronically signed by:  Irasema Salazar    Patient treated and note written by Irasema Salazar, Student Physical Therapist Assistant under supervision of Rodrigo Fernandes PTA.

## 2021-02-25 ENCOUNTER — HOSPITAL ENCOUNTER (OUTPATIENT)
Dept: PHYSICAL THERAPY | Age: 41
Setting detail: THERAPIES SERIES
Discharge: HOME | End: 2021-02-25

## 2021-02-25 PROCEDURE — 97110 THERAPEUTIC EXERCISES: CPT

## 2021-02-25 PROCEDURE — 97140 MANUAL THERAPY 1/> REGIONS: CPT

## 2021-02-25 PROCEDURE — 97016 VASOPNEUMATIC DEVICE THERAPY: CPT
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
## 2021-03-02 ENCOUNTER — HOSPITAL ENCOUNTER (OUTPATIENT)
Dept: PHYSICAL THERAPY | Age: 41
Setting detail: THERAPIES SERIES
Discharge: HOME | End: 2021-03-02

## 2021-03-02 PROCEDURE — 97110 THERAPEUTIC EXERCISES: CPT

## 2021-03-02 PROCEDURE — 97140 MANUAL THERAPY 1/> REGIONS: CPT

## 2021-03-02 PROCEDURE — 97016 VASOPNEUMATIC DEVICE THERAPY: CPT

## 2021-03-02 NOTE — FLOWSHEET NOTE
edema reduction. No adverse response noted post treatment. Will continue to progress as tolerates. [] No change. [x] Other:   [x] Patient would continue to benefit from skilled physical therapy services in order to: decrease ankle pain, increase L LE strength and ankle ROM, and improve walking  and steps, in order to allow Pt to return to work. STG/LTG STG: (to be met in 10 treatments)  1. ? Pain: L ankle 6/10 average pain, 8/10 at worst  2. ? ROM: L DF 4°, PF 10°, Inv 20°, Ever 4°, L knee ext 14°  3. ? Strength: L hip 4-/5, L knee 4-/5, L ankle 3+/5  4. ? Function: LEFS 62% loss of LE function  5. Pt amb indep w/CAM boot for community distances  6. Pt ascend/descend 1 flight steps indep 1 step at a time  7. Patient to be independent with home exercise program as demonstrated by performance with correct form without cues.     LTG: (to be met in 18 treatments)  1. ? Pain: L ankle 3/10 average pain, 6/10 at worst   2. ? ROM: L DF 6°, PF 16°, Inv 26°, Ever 6°, L knee ext 6°  3. ? Strength: L hip 4/5, L knee 4+/5, L ankle 4-/5  4. Pt ascend/descend 1 flight steps indep step over step  5. ? Function: LEFS 30% loss of LE function   6. Pt amb indep w/normal gait pattern for community distances (when Dr Shanda Godoy to go without CAM boot)                    Patient goals: \"Get back to work\"       Pt. Education:  [x] Yes  [] No  [] Reviewed Prior HEP/Ed  Method of Education: [x] Verbal  [x] Demo  [] Written  Comprehension of Education:  [x] Verbalizes understanding. [x] Demonstrates understanding. [] Needs review. [] Demonstrates/verbalizes HEP/Ed previously given. Plan: [x] Continue current frequency toward long and short term goals. [x] Specific Instructions for subsequent treatments: knee ext stretches, gait training. Increase weight, reps as tolerable.     Frequency:  2 x/week for 18 visits       Time In: 8:55 AM            Time Out: 9:55 AM  Electronically signed by:  Luis CAUSEY   Patient treated

## 2021-03-04 ENCOUNTER — HOSPITAL ENCOUNTER (OUTPATIENT)
Dept: PHYSICAL THERAPY | Age: 41
Setting detail: THERAPIES SERIES
Discharge: HOME | End: 2021-03-04

## 2021-03-04 PROCEDURE — 97140 MANUAL THERAPY 1/> REGIONS: CPT

## 2021-03-04 PROCEDURE — 97110 THERAPEUTIC EXERCISES: CPT

## 2021-03-04 PROCEDURE — 97016 VASOPNEUMATIC DEVICE THERAPY: CPT

## 2021-03-04 NOTE — FLOWSHEET NOTE
LE     3 way hip  2x10 4 lbs  Increased weight 3/4 x   Weight shifting  x15  FWD/BWD Pre gait/ Lateral    HS stretch  20\"x3 ea   x   Heel taps to step  10x 6\"   x   HS curls  2x10  4 lbs  Increased weight 3/4 x   Marches  2x10 4 lbs  Increased weight 3/4 x   Mini squat 2x10   Increased reps 3/4 x   Step ups  10xea 4\" 1 UE assist going up with L LE  x          Seated:       Heel/toe raises 2x10  Limited ROM heel raise    Ankle inver/ever x10  Supine with bolster under ankle 3/4 x   Ankle circles  x10  Cw/Ccw -- Supine with bolster  x   Toe curls  2x10  W/ towel    Ankle alphabet  x1      BAPS  x10 L1 Cw/Ccw, plantar/dorsi, inver/leander -- manual assist     LAQ 2x10 3 lbs   x   Toe yoga  x10   x   Calf stretch  20\"x3  Long seated w/ towel x   Domers  x10      Ankle ROM x10 [place]  PF/DF IV/EV - Supine with bolster under ankle, added resistance 3/4 x          Supine:   Dilan    SLR  2x10 3 lbs  Increased weight 3/4 x   SAQ 2x10 3 lbs  Increased weight 3/4 x                               Other: PROM plantar/dorsi flexion, inver,leander, CW, CCW, end range PF stretch for 8 minutes--3/4      Treatment Charges: Mins Units   []  Modalities     [x]  Ther Exercise 35 2   [x]  Manual Therapy 8 1   []  Ther Activities     []  Aquatics     [x]  Vasocompression 15 1   []  Other     Total Treatment time 58 4       Assessment: [x] Progressing toward goals. Began treatment with treadmill this date, patient with improved gait sequence throughout. Increased weight and reps with documented exercises to increase LE strength with good carryover. Patient using MOD dilan UE assist with step ups. Verbal cues needed for patient to put more weight through LE with good understanding and carryover. MIN pain noted with exercise however, patient able to complete all reps. Patient with increased hip motion throughout resisted ankle ROM exercise, tactile cues given to prevent compensation.  Patient continues to have increased swelling around ankle and foot Rosa M Maciel   Patient treated and note written by Johanna De Leon, Student Physical Therapist Assistant under supervision of Reji Jones PTA.

## 2021-03-09 ENCOUNTER — HOSPITAL ENCOUNTER (OUTPATIENT)
Dept: PHYSICAL THERAPY | Age: 41
Setting detail: THERAPIES SERIES
Discharge: HOME | End: 2021-03-09

## 2021-03-09 PROCEDURE — 97016 VASOPNEUMATIC DEVICE THERAPY: CPT

## 2021-03-09 PROCEDURE — 97110 THERAPEUTIC EXERCISES: CPT

## 2021-03-09 PROCEDURE — 97140 MANUAL THERAPY 1/> REGIONS: CPT

## 2021-03-09 NOTE — FLOWSHEET NOTE
[x] [facility]) [facility] &  Therapy  [address]  [phone]  F: [phone] [] [address]  [place] [address]   [address]  P: [phone]  F: [phone] [] [place]  [address]  P: [phone]  F: [phone] [] [address]  P: [phone]  F: [phone] [] [address]  [facility]   [address]   [place]: [phone]  F: [phone]      Physical Therapy Daily Treatment Note    Date:  3/9/2021  Patient Name:  Salma Granda    :  1980  MRN: [id number]    Physician: Fernando Cheng MD                                    Insurance: BC/BS (limit 30 Rx, 30% co-pay)  Medical Diagnosis: Retained orthopedic hardware Z96.9; Secondary localized osteoarthrosis of left ankle and foot M19.272; s/p L ankle fusion                  Rehab Codes: M25.572, M25.672, M25.675, M25.662, R26.2, R26.89, M25.472, M25.475  Onset date: 10/15/2020                     Next 's appt.: 2021    Visit# / total visits: 10/18      Cancels/No Shows: 0/2    Subjective:    Pain:  [x]? Yes  []? No  Location: L foot, ankle            Pain Rating: (0-10 scale) 8.5/10  Pain altered Tx:  []? Yes  [x]? No  Action:  Comments: Patient complains of 8.5/10 pain upon arrival. Says he feels as though his ankle is getting stronger but not as strong as he would like. Objective:  Modalities:  Vaso to L ankle x 15 mins, 38°, med compression, supine with ankle elevated  -- 3/9  Precautions:  WBAT, weaning out of CAM boot as patient is able to tolerate.    Exercises:  Exercise Reps/ Time Weight/ Level Comments    Nustep  5m L1 Wearing shoe     Treadmill  5m 0.8  x          Standing:   Wearing shoe 3/9 -- transitioned to [facility] 3/9    3 way hip  2x10 4 lbs x   Weight shifting  x15  FWD/BWD Pre gait/ Lateral    HS stretch  20\"x3 ea   x   Heel taps to step  10x 6\"      HS curls  2x10  4 lbs   x   Marches  2x10 4 lbs   x   Mini squat 2x10    x   Step ups  10xea 6\" 1 UE assist going up with L LE, increased step height 3/9 x          Seated:       Heel/toe raises 2x10  Limited ROM heel raise    Ankle inver/ever x10      Ankle circles  x10  Cw/Ccw -- Supine with bolster     Toe curls  2x10  W/ towel    Ankle alphabet  x1      BAPS  x10 L1 Cw/Ccw, plantar/dorsi, inver/leander -- manual assist     LAQ 2x10 3 lbs   x   Toe yoga  x10      Calf stretch  20\"x3  Long seated w/ towel x   Domers  x10      Ankle ROM x10 Blue  PF/DF IV/EV - long seated, increased resistance 3/9 x          Supine:   Dilan    SLR  2x10 3 lbs      SAQ 2x10 3 lbs                                  Other: PROM plantar/dorsi flexion, inver,leander, CW, CCW, end range PF stretch for 8 minutes--3/9      Treatment Charges: Mins Units   []  Modalities     [x]  Ther Exercise 52 3   [x]  Manual Therapy 8 1   []  Ther Activities     []  Aquatics     [x]  Vasocompression 15 1   []  Other     Total Treatment time 75 5       Assessment: [x] Progressing toward goals. Limited exercises complete this date due to patients STG's being assessed. Most goals met this date as stated below. Transitioned [facility] to [facility] as stated above to further improve LE strength and overall function. Progressed exercises as documented, patient displayed good tolerance to exercises this date with no increased pain or discomfort noted throughout treatment. Increased resistance with ankle ROM, patient verbalized difficulty however, able to complete all reps. Issued HEP and tband with verbal instruction given for correct performance of exercises. Also, educated patient on keeping up with HEP with good understanding. Ended with vaso for pain relief and edema reduction with positive results. Will progress patient as able to tolerate. [] No change. [x] Other:   [x] Patient would continue to benefit from skilled physical therapy services in order to: decrease ankle pain, increase L LE strength and ankle ROM, and improve walking and steps, in order to allow Pt to return to work. STG/LTG STG: (to be met in 10 treatments)  1. ? Pain: L ankle 6/10 average pain, 8/10 at worst MET 3/9  2. ? ROM: L DF 4°, PF 10°, Inv 20°, Ever 4°, L knee ext 14° MET with the exception of IV/EV 2°/1°, DF lacking 8° 3/9  3. ? Strength: L hip 4-/5, L knee 4-/5, L ankle 3+/5 L Hip flexion/extension MET 3/9. Knee flexion/extension MET 3/9. Ankle MET with the exception of DF/PF, 2- 3/9  4. ? Function: LEFS 62% loss of LE function MET 54% impairment 3/9  5. Pt amb indep w/CAM boot for community distances MET 3/9   6. Pt ascend/descend 1 flight steps indep 1 step at a time MET 3/9  7. Patient to be independent with home exercise program as demonstrated by performance with correct form without cues. MET 3/9     LTG: (to be met in 18 treatments)  1. ? Pain: L ankle 3/10 average pain, 6/10 at worst   2. ? ROM: L DF 6°, PF 16°, Inv 26°, Ever 6°, L knee ext 6°  3. ? Strength: L hip 4/5, L knee 4+/5, L ankle 4-/5  4. Pt ascend/descend 1 flight steps indep step over step  5. ? Function: LEFS 30% loss of LE function   6. Pt amb indep w/normal gait pattern for community distances (when Dr Alex Ramos to go without CAM boot)                    Patient goals: \"Get back to work\"       Pt. Education:  [x] Yes  [] No  [] Reviewed Prior HEP/Ed  Method of Education: [x] Verbal  [x] Demo  [x] Written  Comprehension of Education:  [x] Verbalizes understanding. [x] Demonstrates understanding. [x] Needs review. [x] Demonstrates/verbalizes HEP/Ed previously given. Plan: [x] Continue current frequency toward long and short term goals. [x] Specific Instructions for subsequent treatments: Bands for 3 way hip, increase weights if tolerable, SEATED HS stretch    HEP 3/9 Resisted PF/DF, IV/EV.  Blue and purple Tband issued. Frequency:  2 x/week for 18 visits       Time In: 8:55 AM            Time Out: 10:15 AM  Electronically signed by:  Phuong CAUSEY   Patient treated and note written by Phuong Pavon, Student Physical Therapist Assistant under supervision of Gio Márquez PTA.

## 2021-03-11 ENCOUNTER — HOSPITAL ENCOUNTER (OUTPATIENT)
Dept: PHYSICAL THERAPY | Age: 41
Setting detail: THERAPIES SERIES
Discharge: HOME | End: 2021-03-11

## 2021-03-11 PROCEDURE — 97016 VASOPNEUMATIC DEVICE THERAPY: CPT

## 2021-03-11 PROCEDURE — 97110 THERAPEUTIC EXERCISES: CPT

## 2021-03-11 PROCEDURE — 97140 MANUAL THERAPY 1/> REGIONS: CPT

## 2021-03-11 NOTE — FLOWSHEET NOTE
[x] [facility]) [facility] &  Therapy  [address]  [phone]  F: [phone] [] [address]  [address]   [address]  P: [phone]  F: [phone] [] [place]  [address]  P: [phone]  F: [phone] [] [address]  P: [phone]  F: [phone] [] [address]  [facility]   [address]   [place]: [phone]  F: [phone]      Physical Therapy Daily Treatment Note    Date:  3/11/2021  Patient Name:  Shirley Santacruz    :  1980  MRN: [id number]    Physician: Christoph Arias MD                                    Insurance: BC/BS (limit 30 Rx, 30% co-pay)  Medical Diagnosis: Retained orthopedic hardware Z96.9; 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 Yes  []? No  Location: L foot, ankle            Pain Rating: (0-10 scale) 8.5/10  Pain altered Tx:  []? Yes  [x]? No  Action:  Comments: Patient complains of 8.5/10 pain upon arrival. Says he walked around grocery store yesterday, had some increased pain however states it was \"under control\"  Objective:  Modalities:  Vaso to L ankle x 15 mins, 38°, med compression, supine with ankle elevated  -- 3/11  Precautions:  WBAT, weaning out of CAM boot as patient is able to tolerate.    Exercises:  Exercise Reps/ Time Weight/ Level Comments    Nustep  5m L1 Wearing shoe     Treadmill  5m 0.9 Increased speed 3/11 x          Standing:   Wearing shoe, CKC    3 way hip  2x10 4 lbs   x   Weight shifting  x15  FWD/BWD Pre gait/ Lateral    HS stretch  20\"x3 ea   x   Heel taps to step  10x 6\"      HS curls  2x10  4 lbs   x   Marches  2x10 4 lbs   x   Mini squat 2x10    x   Step ups  10xea 6\" 1 UE assist going up with L LE x          Seated:       HS stretch  3x20\"  Added 3/11 x   Heel/toe raises 2x10  Limited ROM heel raise x   Ankle circles  x10  Cw/Ccw -- Supine with bolster     Toe curls  2x10  W/ towel x   Ankle alphabet  x1      BAPS  x10 L1 Cw/Ccw, plantar/dorsi, inver/leander -- manual assist     LAQ 2x10 3 lbs   x   Toe yoga  x10      Calf stretch  20\"x3  Long seated w/ towel x   Domers  x10      Ankle ROM x10 Blue  PF/DF IV/EV - long seated x   HS curl  10x ea Blue  Added 3/11           Supine:   Dilan    SLR  2x10 3 lbs   x   SAQ 2x10 3 lbs   x                               Other: PROM plantar/dorsi flexion, inver,leander, CW, CCW, end range PF stretch for 8 minutes--3/11      Treatment Charges: Mins Units   []  Modalities     [x]  Ther Exercise 30 2   [x]  Manual Therapy 8 1   []  Ther Activities     []  Aquatics     [x]  Vasocompression 15 1   []  Other     Total Treatment time 53 4       Assessment: [x] Progressing toward goals. Added seated HS stretch this date due to increased tightness. Initiated documented exercises to increase LE strength and ROM with positive results. Patient with improved tolerance to CKC exercises this date with no instances of increased pain or discomfort noted throughout. Tactile cues given with resisted IV/EV to prevent compensation at the hip with good carryover. Patient slowly improving with ankle ROM however, continues to be limited thus applied manual ROM and stretches to further improve function. Ended with vaso for pain and edema reduction with positive results. Will progress as tolerates. [] No change.      [x] Other:   [x] Patient would continue to benefit from skilled physical therapy services in order to: decrease ankle pain, increase L LE strength and ankle ROM, and improve walking and steps, in order to allow Pt to return to work. STG/LTG STG: (to be met in 10 treatments)  1. ? Pain: L ankle 6/10 average pain, 8/10 at worst MET 3/9  2. ? ROM: L DF 4°, PF 10°, Inv 20°, Ever 4°, L knee ext 14° MET with the exception of IV/EV 2°/1°, DF lacking 8° 3/9  3. ? Strength: L hip 4-/5, L knee 4-/5, L ankle 3+/5 L Hip flexion/extension MET 3/9. Knee flexion/extension MET 3/9. Ankle MET with the exception of DF/PF, 2- 3/9  4. ? Function: LEFS 62% loss of LE function MET 54% impairment 3/9  5. Pt amb indep w/CAM boot for community distances MET 3/9   6. Pt ascend/descend 1 flight steps indep 1 step at a time MET 3/9  7. Patient to be independent with home exercise program as demonstrated by performance with correct form without cues. MET 3/9     LTG: (to be met in 18 treatments)  1. ? Pain: L ankle 3/10 average pain, 6/10 at worst   2. ? ROM: L DF 6°, PF 16°, Inv 26°, Ever 6°, L knee ext 6°  3. ? Strength: L hip 4/5, L knee 4+/5, L ankle 4-/5  4. Pt ascend/descend 1 flight steps indep step over step  5. ? Function: LEFS 30% loss of LE function   6. Pt amb indep w/normal gait pattern for community distances (when Dr Ubaldo Mak to go without CAM boot)                    Patient goals: \"Get back to work\"       Pt. Education:  [x] Yes  [] No  [x] Reviewed Prior HEP/Ed  Method of Education: [x] Verbal  [x] Demo  [] Written  Comprehension of Education:  [x] Verbalizes understanding. [x] Demonstrates understanding. [] Needs review. [x] Demonstrates/verbalizes HEP/Ed previously given. HEP 3/9 Resisted PF/DF, IV/EV. Blue and purple Tband issued. Plan: [x] Continue current frequency toward long and short term goals. [x] Specific Instructions for subsequent treatments: Bands for 3 way hip, increase weights if tolerable, incorporate more standing.          Frequency:  2 x/week for 18 visits       Time In: 9:00 AM            Time Out: 9:58 AM  Electronically

## 2021-03-16 ENCOUNTER — HOSPITAL ENCOUNTER (OUTPATIENT)
Dept: PHYSICAL THERAPY | Age: 41
Setting detail: THERAPIES SERIES
Discharge: HOME | End: 2021-03-16

## 2021-03-16 PROCEDURE — 97016 VASOPNEUMATIC DEVICE THERAPY: CPT

## 2021-03-16 PROCEDURE — 97110 THERAPEUTIC EXERCISES: CPT

## 2021-03-16 NOTE — FLOWSHEET NOTE
[x] [address] [facility] &  Therapy  [address]  [phone]  F: [phone] [] [address]  [place] [address]   [address]  P: [phone]  F: [phone] [] [place]  [address]  P: [phone]  F: [phone] [] [address]  P: [phone]  F: [phone] [] [address]  [facility]   [address]   [place]: [phone]  F: [phone]      Physical Therapy Daily Treatment Note    Date:  3/16/2021  Patient Name:  Meme Mazariegos    :  1980  MRN: [id number]    Physician: Manju Beatty MD                                    Insurance: BC/BS (limit 30 Rx, 30% co-pay)  Medical Diagnosis: Retained orthopedic hardware Z96.9; 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 Yes  []? No  Location: L foot, ankle            Pain Rating: (0-10 scale) 8/10  Pain altered Tx:  []? Yes  [x]? No  Action:  Comments: Patient arrives stating 8/10 pain, however he \"just woke up\" so not surprised by this. States continues to use CAM boot for long distances because walking in normal shoes takes him a while and feels more support with CAM boot     Objective:  Modalities:  Vaso to L ankle x 15 mins, 38°, med compression, supine with ankle elevated  -- 3/11  Precautions:  WBAT, weaning out of CAM boot as patient is able to tolerate.    Exercises:  Exercise Reps/ Time Weight/ Level Comments    Nustep  5m L1 Wearing shoe     Treadmill  5m 0.9  x Standing:   Wearing shoe, CKC    Gastroc stretch 3x20\"  Wedge, Added 3/16 x   4 way hip  10x Blue Modified to band 3/16 - LLE CKC only  x   Weight shifting  x15  FWD/BWD Pre gait/ Lateral    HS stretch  20\"x3 ea   x   Heel taps to step  10x 6\"      HS curls  2x10  4 lbs   x   Marches  2x10 4 lbs   x   Mini squat 2x10       Step ups  15 xea 6\" 1 UE assist going up with L LE x   Step downs 15x 4\" Added 3/17 x          Seated:       HS stretch  3x20\"   x   Heel/toe raises 2x10  Limited ROM heel raise x   Ankle circles  x10  Cw/Ccw -- Supine with bolster     Toe curls  2x10  W/ towel    Ankle alphabet  x1      BAPS  x10 L1 Cw/Ccw, plantar/dorsi, inver/leander -- manual assist     LAQ 2x10 4 lbs  Increased weight 3/16 x   Toe yoga  x10      Calf stretch  20\"x3  Long seated w/ towel    Domers  x10      Ankle ROM x10 Blue  PF/DF IV/EV - long seated x   HS curl  15x ea Blue   x          Supine:   Dilan    SLR  2x10 4 lbs  Increased weight 3/16 x   SAQ 2x10 3 lbs   x                               Other: PROM plantar/dorsi flexion, inver,leander, CW, CCW, end range PF stretch for 8 minutes-- Held 3/16      Treatment Charges: Mins Units   []  Modalities     [x]  Ther Exercise 40 3   []  Manual Therapy     []  Ther Activities     []  Aquatics     [x]  Vasocompression 15 1   []  Other     Total Treatment time 55 4       Assessment: [x] Progressing toward goals. Added step downs this date as patient notes increased difficulty with descending steps at home, good tolerance noted to 4\" step within clinic. Also increased ankle weight resistance for various exercises as well as modified 4 way ankle to theraband with no significant issues. Patient demonstrates improved ROM throughout exercises and with functional movements of L ankle mortise and with no complaints of increased pain. Increased swelling of L ankle apparent by end of treatment program, thus applied vaso for edema relief with positive results noted. [] No change. [x] Other: Dorsiflexion ROM measure taken again this date - 6°  [x] Patient would continue to benefit from skilled physical therapy services in order to: decrease ankle pain, increase L LE strength and ankle ROM, and improve walking and steps, in order to allow Pt to return to work. STG/LTG STG: (to be met in 10 treatments)  1. ? Pain: L ankle 6/10 average pain, 8/10 at worst MET 3/9  2. ? ROM: L DF 4°, PF 10°, Inv 20°, Ever 4°, L knee ext 14° MET with the exception of IV/EV 2°/1°, DF MET 6 ° - taken 3/16  3. ? Strength: L hip 4-/5, L knee 4-/5, L ankle 3+/5 L Hip flexion/extension MET 3/9. Knee flexion/extension MET 3/9. Ankle MET with the exception of DF/PF, 2- 3/9  4. ? Function: LEFS 62% loss of LE function MET 54% impairment 3/9  5. Pt amb indep w/CAM boot for community distances MET 3/9   6. Pt ascend/descend 1 flight steps indep 1 step at a time MET 3/9  7. Patient to be independent with home exercise program as demonstrated by performance with correct form without cues. MET 3/9     LTG: (to be met in 18 treatments)  1. ? Pain: L ankle 3/10 average pain, 6/10 at worst   2. ? ROM: L DF 6°, PF 16°, Inv 26°, Ever 6°, L knee ext 6°  3. ? Strength: L hip 4/5, L knee 4+/5, L ankle 4-/5  4. Pt ascend/descend 1 flight steps indep step over step  5. ? Function: LEFS 30% loss of LE function   6. Pt amb indep w/normal gait pattern for community distances (when Dr Jaxon More to go without CAM boot)                    Patient goals: \"Get back to work\"       Pt. Education:  [x] Yes  [] No  [x] Reviewed Prior HEP/Ed  Method of Education: [x] Verbal  [x] Demo  [] Written  Comprehension of Education:  [x] Verbalizes understanding. [x] Demonstrates understanding. [] Needs review. [x] Demonstrates/verbalizes HEP/Ed previously given. HEP 3/9 Resisted PF/DF, IV/EV. Blue and purple Tband issued. Plan: [x] Continue current frequency toward long and short term goals.     [x] Specific Instructions for subsequent treatments:

## 2021-03-18 ENCOUNTER — HOSPITAL ENCOUNTER (OUTPATIENT)
Dept: PHYSICAL THERAPY | Age: 41
Setting detail: THERAPIES SERIES
Discharge: HOME | End: 2021-03-18

## 2021-03-18 NOTE — FLOWSHEET NOTE
[x] [facility] [facility]  [address]    [phone]  F: [phone]          Physical Therapy Cancel/No Show note    Date: 3/18/2021  Patient: Matilde Villarreal  : 1980  MRN: [id number]    Cancels/No Shows to date: 2/3    For today's appointment patient:    [x]  Cancelled    [x] Rescheduled appointment    [] No-show     Reason given by patient:    []  Patient ill    []  Conflicting appointment    [] No transportation      [] Conflict with work    [] No reason given    [] Weather related    [] COVID-19    [x] Other:      Comments: Patient called stating his ankle is too swollen for therapy today. Rescheduled this appointment.         [x] Next appointment was confirmed    Electronically signed by: Barb Mills PTA

## 2021-03-23 ENCOUNTER — HOSPITAL ENCOUNTER (OUTPATIENT)
Dept: PHYSICAL THERAPY | Age: 41
Setting detail: THERAPIES SERIES
Discharge: HOME | End: 2021-03-23

## 2021-03-23 PROCEDURE — 97110 THERAPEUTIC EXERCISES: CPT

## 2021-03-23 PROCEDURE — 97016 VASOPNEUMATIC DEVICE THERAPY: CPT

## 2021-03-23 NOTE — FLOWSHEET NOTE
[x] [facility]) [facility] &  Therapy  [address]  [phone]  F: [phone] [] [address]  [address]   [address]  P: [phone]  F: [phone] [] [place]  [address]  P: [phone]  F: [phone] [] [address]  P: [phone]  F: [phone] [] [address]  [facility]   [address]   [place]: [phone]  F: [phone]      Physical Therapy Daily Treatment Note    Date:  3/23/2021  Patient Name:  Miriam Gerard    :  1980  MRN: [id number]    Physician: Sonia Thayer MD                                    Insurance: BC/BS (limit 30 Rx, 30% co-pay)  Medical Diagnosis: Retained orthopedic hardware Z96.9; Secondary localized osteoarthrosis of left ankle and foot M19.272; s/p L ankle fusion                  Rehab Codes: M25.572, M25.672, M25.675, M25.662, R26.2, R26.89, M25.472, M25.475  Onset date: 10/15/2020                     Next 's appt.: 2021    Visit# / total visits:       Cancels/No Shows: 1/2    Subjective:    Pain:  [x]? Yes  []? No  Location: L foot, ankle            Pain Rating: (0-10 scale) 8/10  Pain altered Tx:  []? Yes  [x]? No  Action:  Comments: Patient notes 8/10 pain at arrival, states just getting moving for the day. States continues to try to walk longer distances in shoe, but difficult. States feels longest distance he could do in a shoe would be a quicker 5-10 minute shopping trip through the grocery store, but longer than that would need scooter chair. Objective:  Modalities:  Vaso to L ankle x 15 mins, 38°, med compression, supine with ankle elevated  -- 3/11  Precautions:  WBAT, weaning out of CAM boot as patient is able to tolerate. Exercises:  Exercise Reps/ Time Weight/ Level Comments    Nustep  5m L1 Wearing shoe     Treadmill  2:30  2:30 0.9 mph  1.2 mph  x          Standing:   Wearing shoe, CKC    Gastroc stretch 3x20\"  Wedge  x   4 way hip  10x Blue Dilan  x   Weight shifting    FWD/BWD Pre gait/ Lateral    HS stretch  20\"x3 ea   x   Heel taps to step  10x 6\"      Hip hikes   Next    HS curls  20x  4 lbs   x   Marches  20x 4 lbs   x   Mini squat 2x10    x   Step ups  15 xea 6\"  x   Step downs 15x 6\" Increased step height 3/23 x          Seated:       HS stretch  3x20\"   x   Heel/toe raises 2x10 4lbs Limited ROM heel raise Added weight 3/23 x   Ankle circles  x10  Cw/Ccw -- Supine with bolster     Toe curls  2x10  W/ towel    Ankle alphabet  x1      BAPS  x10 L1 Cw/Ccw, plantar/dorsi, inver/leander -- manual assist     LAQ 2x10 4 lbs   x   Toe yoga  x10      Calf stretch  20\"x3  Long seated w/ towel    Domers  x10      Ankle ROM x10 Blue  PF/DF IV/EV - long seated x   HS curl  15x ea Blue   x          Supine:   Dilan    SLR  2x10 4 lbs      SAQ 2x10 3 lbs             Sidelying   Added 3/23 - Dilan    Hip abduction 2x10 4lbs  x   Clamshells 2x10 Blue  x   Other: PROM plantar/dorsi flexion, inver,leander, CW, CCW, end range PF stretch for 8 minutes-- Held       Treatment Charges: Mins Units   []  Modalities     [x]  Ther Exercise 50 3   []  Manual Therapy     []  Ther Activities     []  Aquatics     [x]  Vasocompression 15 1   []  Other     Total Treatment time 65 4       Assessment: [x] Progressing toward goals. Patient with increased limping throughout clinic this date during treatment, states minimal increased pain. Able to minimally increase speed for treadmill warmup, however, patient demonstrating minimal hip drop on L side due to weakness and lack of ROM through L ankle during ambulation.  Due to hip drop noted this date, initiated sidelying exs as noted for increased strength and plan to introduce hip hiking next session for continued strengthening. Issued written HEP this date as noted below and blue tband, reviewed with patient with good verbal understanding noted. [] No change. [] Other:  [x] Patient would continue to benefit from skilled physical therapy services in order to: decrease ankle pain, increase L LE strength and ankle ROM, and improve walking and steps, in order to allow Pt to return to work. STG/LTG STG: (to be met in 10 treatments)  1. ? Pain: L ankle 6/10 average pain, 8/10 at worst MET 3/9  2. ? ROM: L DF 4°, PF 10°, Inv 20°, Ever 4°, L knee ext 14° MET with the exception of IV/EV 2°/1°, DF MET 6 ° - taken 3/16  3. ? Strength: L hip 4-/5, L knee 4-/5, L ankle 3+/5 L Hip flexion/extension MET 3/9. Knee flexion/extension MET 3/9. Ankle MET with the exception of DF/PF, 2- 3/9  4. ? Function: LEFS 62% loss of LE function MET 54% impairment 3/9  5. Pt amb indep w/CAM boot for community distances MET 3/9   6. Pt ascend/descend 1 flight steps indep 1 step at a time MET 3/9  7. Patient to be independent with home exercise program as demonstrated by performance with correct form without cues. MET 3/9     LTG: (to be met in 18 treatments)  1. ? Pain: L ankle 3/10 average pain, 6/10 at worst   2. ? ROM: L DF 6°, PF 16°, Inv 26°, Ever 6°, L knee ext 6°  3. ? Strength: L hip 4/5, L knee 4+/5, L ankle 4-/5  4. Pt ascend/descend 1 flight steps indep step over step  5. ? Function: LEFS 30% loss of LE function   6. Pt amb indep w/normal gait pattern for community distances (when Dr Johnson Rachel to go without CAM boot)                    Patient goals: \"Get back to work\"       Pt. Education:  [x] Yes  [] No  [x] Reviewed Prior HEP/Ed  Method of Education: [x] Verbal  [x] Demo  [] Written  Comprehension of Education:  [x] Verbalizes understanding. [x] Demonstrates understanding. [] Needs review. [x] Demonstrates/verbalizes HEP/Ed previously given. HEP 3/9 Resisted PF/DF, IV/EV. Blue and purple Tband issued.   HEP 3/23: Seated heel raises, seated calf stretch, sidelying hip abduction, SL clams, 3 way hip with loop, blue loop theraband     Plan: [x] Continue current frequency toward long and short term goals.     [x] Specific Instructions for subsequent treatments: mini lunges, hip hikes      Frequency:  2 x/week for 18 visits       Time In: 1000 AM            Time Out:  7767 AM    Electronically signed by:  Dena Henriquez PTA

## 2021-03-24 DIAGNOSIS — M25.572 ACUTE LEFT ANKLE PAIN: Primary | ICD-10-CM

## 2021-03-25 ENCOUNTER — HOSPITAL ENCOUNTER (OUTPATIENT)
Dept: PHYSICAL THERAPY | Age: 41
Setting detail: THERAPIES SERIES
Discharge: HOME | End: 2021-03-25

## 2021-03-25 PROCEDURE — 97110 THERAPEUTIC EXERCISES: CPT

## 2021-03-25 PROCEDURE — 97016 VASOPNEUMATIC DEVICE THERAPY: CPT

## 2021-03-25 NOTE — FLOWSHEET NOTE
[x] [facility] &  Therapy  [address]  [phone]  F: [phone] [] [address]  [facility]   [address]  P: [phone]  F: [phone] [] [address]  [address]  P: [phone]  F: [phone] [] [address]  P: [phone]  F: [phone] [] [address]  [facility]   [address]   [place]: [phone]  F: [phone]      Physical Therapy Daily Treatment Note    Date:  3/25/2021  Patient Name:  Rom Mendes    :  1980  MRN: [id number]    Physician: Fantasma Wray MD                                    Insurance: BC/BS (limit 30 Rx, 30% co-pay)  Medical Diagnosis: Retained orthopedic hardware Z96.9; Secondary localized osteoarthrosis of left ankle and foot M19.272; s/p L ankle fusion                  Rehab Codes: M25.572, M25.672, M25.675, M25.662, R26.2, R26.89, M25.472, M25.475  Onset date: 10/15/2020                     Next 's appt.: 2021    Visit# / total visits:       Cancels/No Shows:     Subjective:    Pain:  [x]? Yes  []? No  Location: L foot, ankle            Pain Rating: (0-10 scale) 8/10  Pain altered Tx:  []? Yes  [x]? No  Action:  Comments: Patient arrives stating he tripped on carpet and stumbled this morning but caught himself. However, some increased pain since then. Notes after last session went home and had some pain, but fell asleep right away and pain did not last long. Objective:  Modalities:  Vaso to L ankle x 10 mins, 36°, med compression, supine with ankle elevated    Precautions:  WBAT, weaning out of CAM boot as patient is able to tolerate.    Exercises:  Exercise Reps/ Time Weight/ Level Comments    Nustep  5m L1 Wearing shoe     Treadmill  5m 0.9 mph  1.2 mph  x          Standing:   Wearing shoe, CKC    Gastroc stretch 3x20\"  Wedge  x   4 way hip  15x Blue Dilan  x   HS stretch  20\"x3 ea   x   Heel taps to step  10x 6\"      Hip hikes 15x ea 6\" Added 3/25 x   HS curls  20x  4 lbs   x   Marches  20x 4 lbs   x   Mini squat 2x10    x   Step ups  15 xea 6\"  x   Step downs 15x 6\"  x          Seated:       HS stretch  3x20\"   x   Heel/toe raises 2x10 4lbs Limited ROM heel raise  x   LAQ 2x10 4 lbs   x   4 way ankle  x15 Blue  PF/DF IV/EV - long seated x   HS curl  15x ea Blue   x          Supine:   Dilan - Held 3/25    SLR  2x10 4 lbs      SAQ 2x10 3 lbs             Sidelying       Hip abduction 2x10 4lbs  x   Clamshells 2x10 Blue  x   Other: PROM plantar/dorsi flexion, inver,leander, CW, CCW, end range PF stretch for 8 minutes-- Held       Treatment Charges: Mins Units   []  Modalities     [x]  Ther Exercise 50 3   []  Manual Therapy     []  Ther Activities     []  Aquatics     [x]  Vasocompression 10 1   []  Other     Total Treatment time 60 4       Assessment: [x] Progressing toward goals. Patient with verbalized increased sharp pain through L ankle complex with full weightbearing in various standing exs asher 4 way hip and step ups, however able to complete all reps. Patient states increased symptoms could be due to stumble this morning prior to therapy. Added standing hip hikes this date for further hip strengthening and patient demonstrates hip drop during ambulation. Patient required verbal cues and demonstration for proper hip hike technique, however improved technique with each rep. [] No change. [] Other:  [x] Patient would continue to benefit from skilled physical therapy services in order to: decrease ankle pain, increase L LE strength and ankle ROM, and improve walking and steps, in order to allow Pt to return to work.       STG/LTG STG: (to be met in 10 treatments)  1. ? Pain: L ankle 6/10 average pain, 8/10 at worst MET 3/9  2. ? ROM: L DF 4°, PF 10°, Inv 20°, Ever 4°, L knee ext 14° MET with the exception of IV/EV 2°/1°, DF MET 6 ° - taken 3/16  3. ? Strength: L hip 4-/5, L knee 4-/5, L ankle 3+/5 L Hip flexion/extension MET 3/9. Knee flexion/extension MET 3/9. Ankle MET with the exception of DF/PF, 2- 3/9  4. ? Function: LEFS 62% loss of LE function MET 54% impairment 3/9  5. Pt amb indep w/CAM boot for community distances MET 3/9   6. Pt ascend/descend 1 flight steps indep 1 step at a time MET 3/9  7. Patient to be independent with home exercise program as demonstrated by performance with correct form without cues. MET 3/9     LTG: (to be met in 18 treatments)  1. ? Pain: L ankle 3/10 average pain, 6/10 at worst   2. ? ROM: L DF 6°, PF 16°, Inv 26°, Ever 6°, L knee ext 6°  3. ? Strength: L hip 4/5, L knee 4+/5, L ankle 4-/5  4. Pt ascend/descend 1 flight steps indep step over step  5. ? Function: LEFS 30% loss of LE function   6. Pt amb indep w/normal gait pattern for community distances (when Dr Ronnie Cramer to go without CAM boot)                    Patient goals: \"Get back to work\"       Pt. Education:  [x] Yes  [] No  [x] Reviewed Prior HEP/Ed  Method of Education: [x] Verbal  [x] Demo  [] Written  Comprehension of Education:  [x] Verbalizes understanding. [x] Demonstrates understanding. [] Needs review. [x] Demonstrates/verbalizes HEP/Ed previously given. HEP 3/9 Resisted PF/DF, IV/EV. Blue and purple Tband issued. HEP 3/23: Seated heel raises, seated calf stretch, sidelying hip abduction, SL clams, 3 way hip with loop, blue loop theraband     Plan: [x] Continue current frequency toward long and short term goals.     [x] Specific Instructions for subsequent treatments: mini lunges      Frequency:  2 x/week for 18 visits       Time In: 1000 AM            Time Out:  1100 AM    Electronically signed by:  Laura Montero PTA

## 2021-03-30 ENCOUNTER — OFFICE VISIT (OUTPATIENT)
Dept: ORTHOPEDIC SURGERY | Age: 41
End: 2021-03-30
Payer: COMMERCIAL

## 2021-03-30 ENCOUNTER — HOSPITAL ENCOUNTER (OUTPATIENT)
Dept: PHYSICAL THERAPY | Age: 41
Setting detail: THERAPIES SERIES
Discharge: HOME | End: 2021-03-30

## 2021-03-30 ENCOUNTER — APPOINTMENT (OUTPATIENT)
Dept: PHYSICAL THERAPY | Age: 41
End: 2021-03-30

## 2021-03-30 VITALS — BODY MASS INDEX: 37.77 KG/M2 | RESPIRATION RATE: 12 BRPM | TEMPERATURE: 98.4 F | WEIGHT: 235 LBS | HEIGHT: 66 IN

## 2021-03-30 DIAGNOSIS — M19.272 SECONDARY LOCALIZED OSTEOARTHROSIS OF LEFT ANKLE AND FOOT: Primary | ICD-10-CM

## 2021-03-30 DIAGNOSIS — Z98.1 ARTHRODESIS STATUS: ICD-10-CM

## 2021-03-30 DIAGNOSIS — M19.079 ARTHRITIS OF SUBTALAR JOINT: ICD-10-CM

## 2021-03-30 PROCEDURE — 20605 DRAIN/INJ JOINT/BURSA W/O US: CPT | Performed by: ORTHOPAEDIC SURGERY

## 2021-03-30 PROCEDURE — 99213 OFFICE O/P EST LOW 20 MIN: CPT | Performed by: ORTHOPAEDIC SURGERY
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
## 2021-04-08 ENCOUNTER — HOSPITAL ENCOUNTER (OUTPATIENT)
Dept: PHYSICAL THERAPY | Age: 41
Setting detail: THERAPIES SERIES
Discharge: HOME | End: 2021-04-08

## 2021-04-08 PROCEDURE — 97110 THERAPEUTIC EXERCISES: CPT

## 2021-04-08 PROCEDURE — 97016 VASOPNEUMATIC DEVICE THERAPY: CPT

## 2021-04-08 NOTE — FLOWSHEET NOTE
[x] [facility]) [facility] &  Therapy  [address]  [phone]  F: [phone] [] [address]  [address]   [address]  P: [phone]  F: [phone] [] [place]  [address]  P: [phone]  F: [phone] [] [address]  P: [phone]  F: [phone] [] [address]  [facility]   [address]   [place]: [phone]  F: [phone]      Physical Therapy Daily Treatment Note    Date:  2021  Patient Name:  Wilma Smith    :  1980  MRN: [id number]    Physician: Db Linder MD                                    Insurance: BC/BS (limit 30 Rx, 30% co-pay)  Medical Diagnosis: Retained orthopedic hardware Z96.9; Secondary localized osteoarthrosis of left ankle and foot M19.272; s/p L ankle fusion                  Rehab Codes: M25.572, M25.672, M25.675, M25.662, R26.2, R26.89, M25.472, M25.475  Onset date: 10/15/2020                     Next Dr's appt.: 2021    Visit# / total visits: 15/18      Cancels/No Shows: 3/4    Subjective:    Pain:  [x]? Yes  []? No  Location: L foot, ankle            Pain Rating: (0-10 scale) 8/10  Pain altered Tx:  []? Yes  [x]? No  Action:  Comments: Patient arrives with no CAM boot, in tennis shoe, after short lapse in PT appts. States he is feeling a little down as his son passed away recently, states he feels okay for therapy today. Notes at last appt with referring dr, reports doctor gave referral for brace and pt has yet to order, received injection which was very sore following day and has subsided since, and patient is scheduled for FCE evaluation at [facility] location.      Objective:  Modalities:  Vaso to L ankle x 10 mins, 36°, med compression, supine with ankle elevated    Precautions:  Patient to wean out of CAM boot into shoe as able. Exercises:  Exercise Reps/ Time Weight/ Level Comments    Nustep  5m L1 Wearing shoe     Treadmill  6m  1.0 mph  1.2 mph  x          Standing:   Wearing shoe, CKC    Gastroc stretch 3x20\"  Wedge  x   4 way hip  15x Blue Dilan  x   HS stretch  20\"x3 ea   x   Heel taps to step  10x 6\"      Hip hikes 15x ea 6\"  x   HS curls  20x  4 lbs   x   Marches  20x 4 lbs   x   Mini squat 2x10    x   Step ups  2x10 xea 6\" Increased reps 4/8 x   Lateral step ups 2x10 6\" Added 4/8  x   Step downs 2x10 4\"  x   Mini lunges 10x  Added 4/8 RUE assist only     Leg press                      Seated:       HS stretch  3x20\"   x   Heel/toe raises 2x15 4lbs Limited ROM heel raise - Increased reps 4/8 x   LAQ 2x10 4 lbs   x   4 way ankle  x15 Blue  PF/DF IV/EV - long seated x   HS curl  15x ea Blue             Supine:   Dilan - Held 3/25    SLR  2x10 4 lbs      SAQ 2x10 3 lbs             Sidelying       Hip abduction 2x10 4lbs  x   Clamshells 2x10 Blue  x   Other: PROM plantar/dorsi flexion, inver,leander, CW, CCW, end range PF stretch for 8 minutes-- Held       Treatment Charges: Mins Units   []  Modalities     [x]  Ther Exercise 55 4   []  Manual Therapy     []  Ther Activities     []  Aquatics     [x]  Vasocompression 15 1   []  Other     Total Treatment time 70 5       Assessment: [x] Progressing toward goals. Patient with excellent tolerance to program with progressions as noted and continues to demonstrate functional ROM throughout therapeutic exercises. Patient with most limitations during ambulation, continues with limp due to weakness and ROM limitations, however tolerating ambulation in tennis shoe well. Patient with decreased edema post treatment this date, improved as compared to previous treatments, continued with vasocompression to end session for pain relief and swelling prevention with positive result. [] No change.      [] Other:  [x] Patient would continue to benefit from skilled physical therapy services in order to: decrease ankle pain, increase L LE strength and ankle ROM, and improve walking and steps, in order to allow Pt to return to work. STG/LTG STG: (to be met in 10 treatments)  1. ? Pain: L ankle 6/10 average pain, 8/10 at worst MET 3/9  2. ? ROM: L DF 4°, PF 10°, Inv 20°, Ever 4°, L knee ext 14° MET with the exception of IV/EV 2°/1°, DF MET 6 ° - taken 3/16  3. ? Strength: L hip 4-/5, L knee 4-/5, L ankle 3+/5 L Hip flexion/extension MET 3/9. Knee flexion/extension MET 3/9. Ankle MET with the exception of DF/PF, 2- 3/9  4. ? Function: LEFS 62% loss of LE function MET 54% impairment 3/9  5. Pt amb indep w/CAM boot for community distances MET 3/9   6. Pt ascend/descend 1 flight steps indep 1 step at a time MET 3/9  7. Patient to be independent with home exercise program as demonstrated by performance with correct form without cues. MET 3/9     LTG: (to be met in 18 treatments)  1. ? Pain: L ankle 3/10 average pain, 6/10 at worst   2. ? ROM: L DF 6°, PF 16°, Inv 26°, Ever 6°, L knee ext 6°  3. ? Strength: L hip 4/5, L knee 4+/5, L ankle 4-/5  4. Pt ascend/descend 1 flight steps indep step over step  5. ? Function: LEFS 30% loss of LE function   6. Pt amb indep w/normal gait pattern for community distances (when Dr Nicholas Baker to go without CAM boot)                    Patient goals: \"Get back to work\"       Pt. Education:  [x] Yes  [] No  [x] Reviewed Prior HEP/Ed  Method of Education: [x] Verbal  [x] Demo  [] Written  Comprehension of Education:  [x] Verbalizes understanding. [x] Demonstrates understanding. [] Needs review. [x] Demonstrates/verbalizes HEP/Ed previously given. HEP 3/9 Resisted PF/DF, IV/EV. Blue and purple Tband issued. HEP 3/23: Seated heel raises, seated calf stretch, sidelying hip abduction, SL clams, 3 way hip with loop, blue loop theraband     Plan: [x] Continue current frequency toward long and short term goals.     [x] Specific Instructions for subsequent treatments: leg press       Frequency:  2 x/week for 18 visits       Time In: 901 AM            Time Out:  1010 AM    Electronically signed by:  Kasey Freitas PTA

## 2021-04-13 ENCOUNTER — HOSPITAL ENCOUNTER (OUTPATIENT)
Dept: PHYSICAL THERAPY | Facility: CLINIC | Age: 41
Setting detail: THERAPIES SERIES
Discharge: HOME | End: 2021-04-13

## 2021-04-13 PROCEDURE — 97750 PHYSICAL PERFORMANCE TEST: CPT

## 2021-04-15 ENCOUNTER — HOSPITAL ENCOUNTER (OUTPATIENT)
Dept: PHYSICAL THERAPY | Age: 41
Setting detail: THERAPIES SERIES
Discharge: HOME | End: 2021-04-15

## 2021-04-15 NOTE — FLOWSHEET NOTE
[x] [facility] [facility]  [address]    [phone]  F: [phone]          Physical Therapy Cancel/No Show note    Date: 4/15/2021  Patient: Lianne Dalal  : 1980  MRN: [id number]    Cancels/No Shows to date:     For today's appointment patient:    [x]  Cancelled    [] Rescheduled appointment    [] No-show     Reason given by patient:    []  Patient ill    []  Conflicting appointment    [] No transportation      [] Conflict with work    [] No reason given    [] Weather related    [] COVID-19    [x] Other:      Comments: Patient states his ankle is still very swollen and painful from recent FCE test earlier this week.             [x] Next appointment was confirmed    Electronically signed by: Sourav Barbosa PTA

## 2021-04-20 ENCOUNTER — HOSPITAL ENCOUNTER (OUTPATIENT)
Dept: PHYSICAL THERAPY | Age: 41
Setting detail: THERAPIES SERIES
Discharge: HOME | End: 2021-04-20

## 2021-04-20 PROCEDURE — 97110 THERAPEUTIC EXERCISES: CPT

## 2021-04-20 PROCEDURE — 97016 VASOPNEUMATIC DEVICE THERAPY: CPT

## 2021-04-20 NOTE — FLOWSHEET NOTE
[x] [facility]) [facility] &  Therapy  [address]  [phone]  F: [phone] [] [address]  [address]   [address]  P: [phone]  F: [phone] [] [place]  [address]  P: [phone]  F: [phone] [] [address]  P: [phone]  F: [phone] [] [address]  [facility]   [address]   [place]: [phone]  F: [phone]      Physical Therapy Daily Treatment Note    Date:  2021  Patient Name:  Mamta Cesar    :  1980  MRN: [id number]    Physician: Gloria Mina MD                                    Insurance: BC/BS (limit 30 Rx, 30% co-pay)  Medical Diagnosis: Retained orthopedic hardware Z96.9; 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 Yes  []? No  Location: L foot, ankle            Pain Rating: (0-10 scale) 9/10  Pain altered Tx:  []? Yes  [x]? No  Action:  Comments: Patient arrives stating his FCE evaluation last week was strenuous for his ankle, very swollen after. Notes it was a long test but he got through it. States he feels like his balance has been off a little bit more the last few days. Objective:  Modalities:  Vaso to L ankle x 10 mins, 36°, med compression, supine with ankle elevated    Precautions:  Patient to wean out of CAM boot into shoe as able.   Exercises:  Exercise Reps/ Time Weight/ Level Comments    Nustep  5m L1 Wearing shoe     Treadmill  6m  1.0 mph  1.2 mph  x          Standing:   Wearing shoe, CKC    Gastroc stretch 3x20\"  Wedge  x   4 way hip  15x Blue Dilan  x   HS stretch  20\"x3 ea      Hip hikes 15x ea 6\"  x   HS curls  20x  4 lbs      Marches  20x 4 lbs      SLS 3x15\"  Added 4/20 x   Tandem balance 4x15\"  Alternating L foot in front- Added 4/20 x   Mini squat 2x10    x   Step ups  2x10 xea 6\"  x   Lateral step ups 2x10 6\"  x   Step downs 2x10 4\"  x   Mini lunges 11x  RUE assist only  x   Leg press  10x 20lb   x                 Seated:       HS stretch  3x20\"   x   Heel/toe raises 2x15 4lbs Limited ROM heel raise - Increased reps 4/8 x   LAQ 2x10 4 lbs   x   4 way ankle  x20 Blue  PF/DF IV/EV - long seated x   HS curl  15x ea Blue             Supine:   Dilan     SLR  2x10 4 lbs      SAQ 2x10 3 lbs             Sidelying       Hip abduction 2x10 4lbs     Clamshells 2x10 Blue     Other: PROM plantar/dorsi flexion, inver,leander, CW, CCW, end range PF stretch for 8 minutes-- Held       Treatment Charges: Mins Units   []  Modalities     [x]  Ther Exercise 45 3   []  Manual Therapy     []  Ther Activities     []  Aquatics     [x]  Vasocompression 10 1   []  Other     Total Treatment time 55 4       Assessment: [x] Progressing toward goals. Progressed program with addition of balance exercises this date as patient notes decreased balance as of late. Fair tolerance with balance exercises, however difficulty and pain noted throughout. 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 [] No change. [] Other:  [x] Patient would continue to benefit from skilled physical therapy services in order to: decrease ankle pain, increase L LE strength and ankle ROM, and improve walking and steps, in order to allow Pt to return to work.       STG/LTG STG: (to be met in 10 treatments)  1. ? Pain: L ankle 6/10 average pain, 8/10 at worst MET 3/9  2. ? ROM: L DF 4°, PF 10°, Inv 20°, Ever 4°, L knee ext 14° MET with the exception of IV/EV 2°/1°, DF MET 6 ° - taken 3/16  3. ? Strength: L hip 4-/5, L knee 4-/5, L ankle 3+/5 L Hip flexion/extension MET 3/9. Knee flexion/extension MET 3/9. Ankle MET with the exception of DF/PF, 2- 3/9  4. ? Function: LEFS 62% loss of LE function MET 54% impairment 3/9  5. Pt amb indep w/CAM boot for community distances MET 3/9   6. Pt ascend/descend 1 flight steps indep 1 step at a time MET 3/9  7. Patient to be independent with home exercise program as demonstrated by performance with correct form without cues. MET 3/9     LTG: (to be met in 18 treatments)  1. ? Pain: L ankle 3/10 average pain, 6/10 at worst   2. ? ROM: L DF 6°, PF 16°, Inv 26°, Ever 6°, L knee ext 6°  3. ? Strength: L hip 4/5, L knee 4+/5, L ankle 4-/5  4. Pt ascend/descend 1 flight steps indep step over step  5. ? Function: LEFS 30% loss of LE function   6. Pt amb indep w/normal gait pattern for community distances (when Dr Stuart Asper to go without CAM boot)                    Patient goals: \"Get back to work\"       Pt. Education:  [x] Yes  [] No  [] Reviewed Prior HEP/Ed  Method of Education: [x] Verbal  [x] Demo  [] Written  Comprehension of Education:  [x] Verbalizes understanding. [x] Demonstrates understanding. [] Needs review. [x] Demonstrates/verbalizes HEP/Ed previously given. HEP 3/9 Resisted PF/DF, IV/EV. Blue and purple Tband issued. HEP 3/23: Seated heel raises, seated calf stretch, sidelying hip abduction, SL clams, 3 way hip with loop, blue loop theraband     Plan: [x] Continue current frequency toward long and short term goals.     [x] Specific Instructions for subsequent treatments: leg press       Frequency:  2 x/week for 18 visits       Time In: 529 AM            Time Out: 244 AM    Electronically signed by:  Amanda Scott PTA

## 2021-04-22 ENCOUNTER — HOSPITAL ENCOUNTER (OUTPATIENT)
Dept: PHYSICAL THERAPY | Age: 41
Setting detail: THERAPIES SERIES
Discharge: HOME | End: 2021-04-22

## 2021-04-22 NOTE — FLOWSHEET NOTE
[x] [facility]  [address]    [phone]  F: [phone]          Physical Therapy Cancel/No Show note    Date: 2021  Patient: Juan Jose Ewing  : 1980  MRN: [id number]    Cancels/No Shows to date:     For today's appointment patient:    [x]  Cancelled    [x] Rescheduled appointment    [] No-show     Reason given by patient:    []  Patient ill    []  Conflicting appointment    [x] No transportation      [] Conflict with work    [] No reason given    [] Weather related    [] COVID-19    [x] Other:      Comments: Pt left vm cancelling appt, requesting to reschedule. Called Pt back, rescheduled for Tu2021.       [] Next appointment was confirmed    Electronically signed by: Toney Sahni PT

## 2021-04-27 ENCOUNTER — HOSPITAL ENCOUNTER (OUTPATIENT)
Dept: PHYSICAL THERAPY | Age: 41
Setting detail: THERAPIES SERIES
Discharge: HOME | End: 2021-04-27

## 2021-04-27 PROCEDURE — 97110 THERAPEUTIC EXERCISES: CPT

## 2021-04-27 PROCEDURE — 97016 VASOPNEUMATIC DEVICE THERAPY: CPT

## 2021-04-30 ENCOUNTER — OFFICE VISIT (OUTPATIENT)
Dept: FAMILY MEDICINE CLINIC | Age: 41
End: 2021-04-30
Payer: COMMERCIAL

## 2021-04-30 VITALS
TEMPERATURE: 98.6 F | BODY MASS INDEX: 41.16 KG/M2 | OXYGEN SATURATION: 97 % | DIASTOLIC BLOOD PRESSURE: 84 MMHG | WEIGHT: 255 LBS | HEART RATE: 78 BPM | SYSTOLIC BLOOD PRESSURE: 136 MMHG

## 2021-04-30 DIAGNOSIS — F41.9 ANXIETY AND DEPRESSION: Primary | ICD-10-CM

## 2021-04-30 DIAGNOSIS — F32.A ANXIETY AND DEPRESSION: Primary | ICD-10-CM

## 2021-04-30 PROBLEM — E55.9 VITAMIN D DEFICIENCY: Status: ACTIVE | Noted: 2021-04-30

## 2021-04-30 PROCEDURE — 99215 OFFICE O/P EST HI 40 MIN: CPT | Performed by: NURSE PRACTITIONER

## 2021-04-30 RX ORDER — BUSPIRONE HYDROCHLORIDE 10 MG/1
10 TABLET ORAL 3 TIMES DAILY
Qty: 90 TABLET | Refills: 0 | Status: SHIPPED | OUTPATIENT
Start: 2021-04-30 | End: 2021-06-01 | Stop reason: SDUPTHER

## 2021-04-30 ASSESSMENT — ENCOUNTER SYMPTOMS
BACK PAIN: 0
ABDOMINAL DISTENTION: 0
CONSTIPATION: 0
RHINORRHEA: 0
ABDOMINAL PAIN: 0
SHORTNESS OF BREATH: 0
COUGH: 0
DIARRHEA: 0
VOMITING: 0
NAUSEA: 0
SORE THROAT: 0
CHEST TIGHTNESS: 0

## 2021-04-30 ASSESSMENT — PATIENT HEALTH QUESTIONNAIRE - PHQ9
SUM OF ALL RESPONSES TO PHQ9 QUESTIONS 1 & 2: 6
9. THOUGHTS THAT YOU WOULD BE BETTER OFF DEAD, OR OF HURTING YOURSELF: 1
7. TROUBLE CONCENTRATING ON THINGS, SUCH AS READING THE NEWSPAPER OR WATCHING TELEVISION: 3
2. FEELING DOWN, DEPRESSED OR HOPELESS: 3
3. TROUBLE FALLING OR STAYING ASLEEP: 3
10. IF YOU CHECKED OFF ANY PROBLEMS, HOW DIFFICULT HAVE THESE PROBLEMS MADE IT FOR YOU TO DO YOUR WORK, TAKE CARE OF THINGS AT HOME, OR GET ALONG WITH OTHER PEOPLE: 2
6. FEELING BAD ABOUT YOURSELF - OR THAT YOU ARE A FAILURE OR HAVE LET YOURSELF OR YOUR FAMILY DOWN: 1
4. FEELING TIRED OR HAVING LITTLE ENERGY: 3
SUM OF ALL RESPONSES TO PHQ QUESTIONS 1-9: 20
8. MOVING OR SPEAKING SO SLOWLY THAT OTHER PEOPLE COULD HAVE NOTICED. OR THE OPPOSITE, BEING SO FIGETY OR RESTLESS THAT YOU HAVE BEEN MOVING AROUND A LOT MORE THAN USUAL: 1

## 2021-04-30 ASSESSMENT — COLUMBIA-SUICIDE SEVERITY RATING SCALE - C-SSRS: 6. HAVE YOU EVER DONE ANYTHING, STARTED TO DO ANYTHING, OR PREPARED TO DO ANYTHING TO END YOUR LIFE?: NO

## 2021-04-30 NOTE — PROGRESS NOTES
Swetha Shi, APRN-CNP  [facility]  [address] [address], [address]  [address] [address]  Dept: [phone]  Dept Fax: [phone]     Patient ID: Kiko De La Garza is a 45 y.o. male. HPI:    Established pt here today for an acute visit secondary to increasing anxiety and depression. He relates that he has always suffered from depression but was able to manage it on his own. He has a history of anxeity and was prescribed Xanax at one time but has not taken it in years. He relates that his 15year old son was recently killed (about 4 weeks ago) and he is struggling more with depression. There are times he doesn't want to get up and get things done. He relates that he is also having panic attacks. He relates that his heart will start to beat very fast and he will get chest pains. He relates that when feels like this, he tries to breathe and relax and it goes away. Pt denies any fever or chills. Pt today denies any HA, chest pain, or SOB. Pt denies any N/V/D/C or abdominal pain. He continues to follow with Dr. Sloan Baker for his left ankle. He did have surgery back in September. He did recover from surgery without difficulty but e continues to have pain. He continues to follow with [facility]'s PT. My previous office notes, labs and diagnostic studies were reviewed prior to and during encounter. The patient's past medical, surgical, social, and family history as well as current medications and allergies were reviewed as documented in today's encounter by PERLITA Fan.        Current Outpatient Medications on File Prior to Visit   Medication Sig Dispense Refill    aspirin 325 MG EC tablet Take 1 tablet by mouth daily 42 tablet 0    docusate sodium (COLACE) 100 MG capsule Take 100 mg by mouth 2 times daily      Cholecalciferol (VITAMIN D3) 50 MCG (2000 UT) CAPS Take 1 capsule by mouth daily        No current facility-administered medications on file prior to visit. Subjective:   Review of Systems   Constitutional: Negative for activity change, fatigue and fever. HENT: Negative for congestion, ear pain, rhinorrhea and sore throat. Respiratory: Negative for cough, chest tightness and shortness of breath. Cardiovascular: Negative for chest pain and palpitations. Gastrointestinal: Negative for abdominal distention, abdominal pain, constipation, diarrhea, nausea and vomiting. Endocrine: Negative for polydipsia, polyphagia and polyuria. Genitourinary: Negative for difficulty urinating and dysuria. Musculoskeletal: Negative for arthralgias, back pain and myalgias. Skin: Negative for rash. Neurological: Negative for dizziness, weakness, light-headedness and headaches. Hematological: Negative for adenopathy. Psychiatric/Behavioral: Positive for dysphoric mood. Negative for agitation and behavioral problems. The patient is nervous/anxious. Objective:  Physical Exam  Vitals signs reviewed. Constitutional:       General: He is not in acute distress. Appearance: Normal appearance. He is well-developed. HENT:      Head: Normocephalic and atraumatic. Right Ear: Hearing and external ear normal.      Left Ear: Hearing and external ear normal.      Nose: Nose normal. No congestion. Right Sinus: No maxillary sinus tenderness or frontal sinus tenderness. Left Sinus: No maxillary sinus tenderness or frontal sinus tenderness. Mouth/Throat:      Lips: Pink. No lesions. Mouth: Mucous membranes are moist. No oral lesions. Tongue: No lesions. Pharynx: Oropharynx is clear. No oropharyngeal exudate or posterior oropharyngeal erythema. Eyes:      Extraocular Movements: Extraocular movements intact. Conjunctiva/sclera: Conjunctivae normal.      Pupils: Pupils are equal, round, and reactive to light. Neck:      Musculoskeletal: Full passive range of motion without pain and normal range of motion. Thyroid: No thyroid mass. Cardiovascular:      Rate and Rhythm: Normal rate and regular rhythm. Pulses: Normal pulses. Heart sounds: Normal heart sounds. No murmur. Pulmonary:      Effort: Pulmonary effort is normal. No respiratory distress. Breath sounds: Normal breath sounds and air entry. No wheezing, rhonchi or rales. Abdominal:      General: Bowel sounds are normal. There is no distension. Palpations: Abdomen is soft. Tenderness: There is no abdominal tenderness. Musculoskeletal: Normal range of motion. Right lower leg: No edema. Left lower leg: No edema. Lymphadenopathy:      Cervical: No cervical adenopathy. Skin:     General: Skin is warm and dry. Capillary Refill: Capillary refill takes less than 2 seconds. Findings: No rash. Neurological:      General: No focal deficit present. Mental Status: He is alert and oriented to person, place, and time. Deep Tendon Reflexes: Reflexes normal.   Psychiatric:         Attention and Perception: Attention and perception normal.         Mood and Affect: Mood is depressed. Affect is labile. Speech: Speech normal.         Behavior: Behavior normal. Behavior is cooperative. Cognition and Memory: Memory normal.       Assessment:   Diagnosis Orders   1. Anxiety and depression  sertraline (ZOLOFT) 50 MG tablet    busPIRone (BUSPAR) 10 MG tablet     Plan:  1. Anxiety and depression  - After lengthy discussion with patient regarding life events and feelings of nervousness, restlessness, and difficulty relaxing, together we opted for pharmacological intervention.    - Will start Zoloft and Buspar  - I did discuss with patient regarding starting two medications at one time. He was instructed to start the Zoloft.  If he was doing well, without reaction or significant side effects, he could start the Buspar  - He verbalized understanding regarding the new medications  - Offered reassurance and allowed to ventilate feelings and ask questions. - Continue to monitor for triggers of increase anxiety and/or stressors.   - Encouraged patient to express needs and concerns.   - Non-pharmological coping methods discussed. - Will have him back in a month for evaluation  - sertraline (ZOLOFT) 50 MG tablet; Take 1 tablet by mouth daily  Dispense: 30 tablet; Refill: 0  - busPIRone (BUSPAR) 10 MG tablet; Take 1 tablet by mouth 3 times daily  Dispense: 90 tablet; Refill: 0    - Rest of systems unchanged, continue current treatments. - On this date April 30, 2021,  I have spent greater than 50% of this  40 minute visit reviewing previous notes, test results and/or face to face with the patient discussing the diagnoses, importance of compliance with the treatment plan, counseling, coordinating care as well as documenting on the day of the visit.      ALEXA Mcdaniel-CNP

## 2021-05-06 DIAGNOSIS — M25.572 ACUTE LEFT ANKLE PAIN: Primary | ICD-10-CM

## 2021-05-28 DIAGNOSIS — M25.572 ACUTE LEFT ANKLE PAIN: Primary | ICD-10-CM

## 2021-06-01 ENCOUNTER — TELEPHONE (OUTPATIENT)
Dept: FAMILY MEDICINE CLINIC | Age: 41
End: 2021-06-01

## 2021-06-01 ENCOUNTER — VIRTUAL VISIT (OUTPATIENT)
Dept: FAMILY MEDICINE CLINIC | Age: 41
End: 2021-06-01

## 2021-06-01 DIAGNOSIS — Z00.00 PREVENTATIVE HEALTH CARE: ICD-10-CM

## 2021-06-01 DIAGNOSIS — E55.9 VITAMIN D DEFICIENCY: ICD-10-CM

## 2021-06-01 DIAGNOSIS — F32.A ANXIETY AND DEPRESSION: Primary | ICD-10-CM

## 2021-06-01 DIAGNOSIS — F41.9 ANXIETY AND DEPRESSION: Primary | ICD-10-CM

## 2021-06-01 PROBLEM — M19.072 ARTHRITIS OF LEFT ANKLE: Status: ACTIVE | Noted: 2019-04-12

## 2021-06-01 PROCEDURE — 99214 OFFICE O/P EST MOD 30 MIN: CPT | Performed by: NURSE PRACTITIONER

## 2021-06-01 RX ORDER — BUSPIRONE HYDROCHLORIDE 10 MG/1
10 TABLET ORAL 3 TIMES DAILY
Qty: 270 TABLET | Refills: 1 | Status: SHIPPED | OUTPATIENT
Start: 2021-06-01 | End: 2021-08-30

## 2021-06-01 SDOH — ECONOMIC STABILITY: FOOD INSECURITY: WITHIN THE PAST 12 MONTHS, YOU WORRIED THAT YOUR FOOD WOULD RUN OUT BEFORE YOU GOT MONEY TO BUY MORE.: NEVER TRUE

## 2021-06-01 SDOH — ECONOMIC STABILITY: FOOD INSECURITY: WITHIN THE PAST 12 MONTHS, THE FOOD YOU BOUGHT JUST DIDN'T LAST AND YOU DIDN'T HAVE MONEY TO GET MORE.: NEVER TRUE

## 2021-06-01 ASSESSMENT — ENCOUNTER SYMPTOMS
SHORTNESS OF BREATH: 0
DIARRHEA: 0
BACK PAIN: 0
ABDOMINAL PAIN: 0
COUGH: 0
RHINORRHEA: 0
ABDOMINAL DISTENTION: 0
NAUSEA: 0
CONSTIPATION: 0
VOMITING: 0
SORE THROAT: 0
CHEST TIGHTNESS: 0

## 2021-06-01 ASSESSMENT — SOCIAL DETERMINANTS OF HEALTH (SDOH): HOW HARD IS IT FOR YOU TO PAY FOR THE VERY BASICS LIKE FOOD, HOUSING, MEDICAL CARE, AND HEATING?: NOT HARD AT ALL

## 2021-06-01 NOTE — PROGRESS NOTES
Latoya Bishop, APRN-CNP  [address] [facility]  [id number]  [address] [address]  Dept: [phone]  Dept Fax: [phone]     PATIENT ID: Lyndsey Marshall is a 45 y.o. male. HPI:  Established pt presenting via virtual visit today for f/u on anxiety, depression after being started on Zoloft and Buspar. Pt denies any fever or chills. Pt today denies any HA, chest pain, or SOB. Pt denies any N/V/D/C or abdominal pain. Today, patient is doing better with the Zoloft and Buspar but thinks things could be a little better. My previous office notes, labs and diagnostic studies were reviewed prior to and during encounter. The patient's past medical, surgical, social, and family history as well as current medications and allergies were reviewed as documented in today's encounter by PERLITA Hurd. Current Outpatient Medications on File Prior to Visit   Medication Sig Dispense Refill    aspirin 325 MG EC tablet Take 1 tablet by mouth daily 42 tablet 0    docusate sodium (COLACE) 100 MG capsule Take 100 mg by mouth 2 times daily       No current facility-administered medications on file prior to visit. SUBJECTIVE:     Review of Systems   Constitutional: Negative for activity change, fatigue and fever. HENT: Negative for congestion, ear pain, rhinorrhea and sore throat. Respiratory: Negative for cough, chest tightness and shortness of breath. Cardiovascular: Negative for chest pain and palpitations. Gastrointestinal: Negative for abdominal distention, abdominal pain, constipation, diarrhea, nausea and vomiting. Endocrine: Negative for polydipsia, polyphagia and polyuria. Genitourinary: Negative for difficulty urinating and dysuria. Musculoskeletal: Negative for arthralgias, back pain and myalgias. Skin: Negative for rash. Neurological: Negative for dizziness, weakness, light-headedness and headaches. order and call with results  - Vitamin D 25 Hydroxy; Future    - Rest of systems unchanged, continue current treatments. - On this date June 1, 2021,  I have spent greater than 50% of this visit reviewing previous notes, test results and/or face to face with the patient discussing the diagnoses, importance of compliance with the treatment plan, counseling, coordinating care as well as documenting on the day of the visit.      Ulises Yuan, APRN-CNP

## 2021-06-05 NOTE — LETTER
[address] [place] [address] and Sports Medicine  [place] [address]  Phone: [phone]  Fax: [phone]    Breonna Oliver MD        October 28, 2020     Patient: Rogelio Candelario   YOB: 1980   Date of Visit: 10/28/2020       To Whom it May Concern:    Marianne Beach was seen in my clinic on 10/28/2020. He had surgery on 9/25/2020 and 10/15/2020. He will remain out of work 12 weeks from today's date. If you have any questions or concerns, please don't hesitate to call.     Sincerely,     The office of Daisy Franco MD FYI

## 2021-06-22 DIAGNOSIS — M25.572 ACUTE LEFT ANKLE PAIN: Primary | ICD-10-CM

## 2021-07-15 DIAGNOSIS — M25.572 ACUTE LEFT ANKLE PAIN: Primary | ICD-10-CM

## 2021-07-16 ENCOUNTER — OFFICE VISIT (OUTPATIENT)
Dept: ORTHOPEDIC SURGERY | Age: 41
End: 2021-07-16
Payer: MEDICAID

## 2021-07-16 VITALS — TEMPERATURE: 98.1 F | WEIGHT: 255 LBS | BODY MASS INDEX: 40.98 KG/M2 | RESPIRATION RATE: 12 BRPM | HEIGHT: 66 IN

## 2021-07-16 DIAGNOSIS — Z98.1 ARTHRODESIS STATUS: ICD-10-CM

## 2021-07-16 DIAGNOSIS — M19.079 ARTHRITIS OF SUBTALAR JOINT: Primary | ICD-10-CM

## 2021-07-16 DIAGNOSIS — Z96.9 RETAINED ORTHOPEDIC HARDWARE: ICD-10-CM

## 2021-07-16 PROCEDURE — 20605 DRAIN/INJ JOINT/BURSA W/O US: CPT | Performed by: ORTHOPAEDIC SURGERY

## 2021-07-16 PROCEDURE — G8417 CALC BMI ABV UP PARAM F/U: HCPCS | Performed by: ORTHOPAEDIC SURGERY

## 2021-07-16 PROCEDURE — 99214 OFFICE O/P EST MOD 30 MIN: CPT | Performed by: ORTHOPAEDIC SURGERY

## 2021-07-16 PROCEDURE — G8427 DOCREV CUR MEDS BY ELIG CLIN: HCPCS | Performed by: ORTHOPAEDIC SURGERY

## 2021-07-16 PROCEDURE — 1036F TOBACCO NON-USER: CPT | Performed by: ORTHOPAEDIC SURGERY

## 2021-07-16 RX ORDER — TRIAMCINOLONE ACETONIDE 40 MG/ML
40 INJECTION, SUSPENSION INTRA-ARTICULAR; INTRAMUSCULAR ONCE
Status: COMPLETED | OUTPATIENT
Start: 2021-07-16 | End: 2021-07-16

## 2021-07-16 RX ORDER — LIDOCAINE HYDROCHLORIDE 10 MG/ML
2 INJECTION, SOLUTION INFILTRATION; PERINEURAL ONCE
Status: COMPLETED | OUTPATIENT
Start: 2021-07-16 | End: 2021-07-16

## 2021-07-16 RX ADMIN — LIDOCAINE HYDROCHLORIDE 2 ML: 10 INJECTION, SOLUTION INFILTRATION; PERINEURAL at 16:29

## 2021-07-16 RX ADMIN — TRIAMCINOLONE ACETONIDE 40 MG: 40 INJECTION, SUSPENSION INTRA-ARTICULAR; INTRAMUSCULAR at 16:30
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
## 2021-08-11 ENCOUNTER — INITIAL CONSULT (OUTPATIENT)
Dept: PAIN MANAGEMENT | Age: 41
End: 2021-08-11
Payer: MEDICAID

## 2021-08-11 VITALS
BODY MASS INDEX: 41.01 KG/M2 | SYSTOLIC BLOOD PRESSURE: 149 MMHG | DIASTOLIC BLOOD PRESSURE: 95 MMHG | WEIGHT: 255.2 LBS | HEIGHT: 66 IN

## 2021-08-11 DIAGNOSIS — Z98.1 HX OF ANKLE FUSION: ICD-10-CM

## 2021-08-11 DIAGNOSIS — F11.90 CHRONIC, CONTINUOUS USE OF OPIOIDS: ICD-10-CM

## 2021-08-11 DIAGNOSIS — G89.29 CHRONIC PAIN OF LEFT ANKLE: ICD-10-CM

## 2021-08-11 DIAGNOSIS — G57.72 COMPLEX REGIONAL PAIN SYNDROME TYPE 2 OF LEFT LOWER EXTREMITY: Primary | ICD-10-CM

## 2021-08-11 DIAGNOSIS — M25.572 CHRONIC PAIN OF LEFT ANKLE: ICD-10-CM

## 2021-08-11 DIAGNOSIS — E66.01 MORBID OBESITY WITH BMI OF 40.0-44.9, ADULT (HCC): ICD-10-CM

## 2021-08-11 PROCEDURE — G8417 CALC BMI ABV UP PARAM F/U: HCPCS | Performed by: ANESTHESIOLOGY

## 2021-08-11 PROCEDURE — 99244 OFF/OP CNSLTJ NEW/EST MOD 40: CPT | Performed by: ANESTHESIOLOGY

## 2021-08-11 PROCEDURE — G8427 DOCREV CUR MEDS BY ELIG CLIN: HCPCS | Performed by: ANESTHESIOLOGY

## 2021-08-11 RX ORDER — GABAPENTIN 300 MG/1
300 CAPSULE ORAL 2 TIMES DAILY
Qty: 60 CAPSULE | Refills: 0 | Status: ON HOLD | OUTPATIENT
Start: 2021-08-11 | End: 2022-01-12

## 2021-08-11 ASSESSMENT — ENCOUNTER SYMPTOMS
RESPIRATORY NEGATIVE: 1
GASTROINTESTINAL NEGATIVE: 1
ALLERGIC/IMMUNOLOGIC NEGATIVE: 1
EYES NEGATIVE: 1
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
## 2021-10-14 ENCOUNTER — OFFICE VISIT (OUTPATIENT)
Dept: ORTHOPEDIC SURGERY | Age: 41
End: 2021-10-14
Payer: MEDICAID

## 2021-10-14 VITALS — TEMPERATURE: 98.4 F | WEIGHT: 255 LBS | RESPIRATION RATE: 12 BRPM | BODY MASS INDEX: 35.7 KG/M2 | HEIGHT: 71 IN

## 2021-10-14 DIAGNOSIS — M19.079 ARTHRITIS OF SUBTALAR JOINT: Primary | ICD-10-CM

## 2021-10-14 DIAGNOSIS — Z98.1 ARTHRODESIS STATUS: ICD-10-CM

## 2021-10-14 DIAGNOSIS — Z96.9 RETAINED ORTHOPEDIC HARDWARE: ICD-10-CM

## 2021-10-14 PROCEDURE — G8484 FLU IMMUNIZE NO ADMIN: HCPCS | Performed by: ORTHOPAEDIC SURGERY

## 2021-10-14 PROCEDURE — 1036F TOBACCO NON-USER: CPT | Performed by: ORTHOPAEDIC SURGERY

## 2021-10-14 PROCEDURE — G8417 CALC BMI ABV UP PARAM F/U: HCPCS | Performed by: ORTHOPAEDIC SURGERY

## 2021-10-14 PROCEDURE — 99213 OFFICE O/P EST LOW 20 MIN: CPT | Performed by: ORTHOPAEDIC SURGERY

## 2021-10-14 PROCEDURE — G8427 DOCREV CUR MEDS BY ELIG CLIN: HCPCS | Performed by: ORTHOPAEDIC SURGERY
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
## 2021-11-10 ENCOUNTER — TELEPHONE (OUTPATIENT)
Dept: ORTHOPEDIC SURGERY | Age: 41
End: 2021-11-10

## 2021-11-10 DIAGNOSIS — M19.272 SECONDARY LOCALIZED OSTEOARTHROSIS OF LEFT ANKLE AND FOOT: ICD-10-CM

## 2021-11-10 DIAGNOSIS — M19.079 ARTHRITIS OF SUBTALAR JOINT: Primary | ICD-10-CM

## 2021-11-10 DIAGNOSIS — M96.0 PSEUDARTHROSIS AFTER JOINT FUSION: ICD-10-CM

## 2021-11-10 DIAGNOSIS — Z96.9 RETAINED ORTHOPEDIC HARDWARE: ICD-10-CM

## 2021-11-10 DIAGNOSIS — Z98.1 ARTHRODESIS STATUS: ICD-10-CM

## 2021-11-10 DIAGNOSIS — M19.172 POST-TRAUMATIC ARTHRITIS OF LEFT ANKLE: ICD-10-CM

## 2021-11-22 ENCOUNTER — HOSPITAL ENCOUNTER (EMERGENCY)
Age: 41
Discharge: HOME | End: 2021-11-22
Attending: EMERGENCY MEDICINE
Payer: MEDICAID

## 2021-11-22 VITALS
HEART RATE: 68 BPM | TEMPERATURE: 97.6 F | SYSTOLIC BLOOD PRESSURE: 123 MMHG | OXYGEN SATURATION: 96 % | DIASTOLIC BLOOD PRESSURE: 83 MMHG | RESPIRATION RATE: 16 BRPM

## 2021-11-22 DIAGNOSIS — R21 RASH: Primary | ICD-10-CM

## 2021-11-22 PROCEDURE — 99282 EMERGENCY DEPT VISIT SF MDM: CPT

## 2021-11-22 RX ORDER — CAMPHOR 0.45 %
14 GEL (GRAM) TOPICAL PRN
Qty: 1 EACH | Refills: 0 | Status: SHIPPED | OUTPATIENT
Start: 2021-11-22 | End: 2021-12-02

## 2021-11-22 RX ORDER — DIAPER,BRIEF,INFANT-TODD,DISP
EACH MISCELLANEOUS
Qty: 20 G | Refills: 0 | Status: SHIPPED | OUTPATIENT
Start: 2021-11-22 | End: 2022-04-07 | Stop reason: SDUPTHER

## 2021-11-22 ASSESSMENT — ENCOUNTER SYMPTOMS
CHEST TIGHTNESS: 0
BACK PAIN: 0
NAUSEA: 0
SHORTNESS OF BREATH: 0
ABDOMINAL PAIN: 0
COUGH: 0
DIARRHEA: 0
VOMITING: 0
TROUBLE SWALLOWING: 0

## 2021-11-22 NOTE — ED PROVIDER NOTES
Highest education level: Not on file   Occupational History    Not on file   Tobacco Use    Smoking status: Former Smoker     Types: Cigarettes     Quit date:      Years since quittin.8    Smokeless tobacco: Never Used   Vaping Use    Vaping Use: Never used   Substance and Sexual Activity    Alcohol use: Yes     Comment: social    Drug use: Never    Sexual activity: Not on file   Other Topics Concern    Not on file   Social History Narrative    Not on file     Social Determinants of Health     Financial Resource Strain: Low Risk     Difficulty of Paying Living Expenses: Not hard at all   Food Insecurity: No Food Insecurity    Worried About [address] in the Last Year: Never true    [facility] in the Last Year: Never true   Transportation Needs:     Lack of Transportation (Medical): Not on file    Lack of Transportation (Non-Medical): Not on file   Physical Activity:     Days of Exercise per Week: Not on file    Minutes of Exercise per Session: Not on file   Stress:     Feeling of Stress : Not on file   Social Connections:     Frequency of Communication with Friends and Family: Not on file    Frequency of Social Gatherings with Friends and Family: Not on file    Attends Sabianist Services: Not on file    Active Member of [address] [place] [address] or Organizations: Not on file    Attends Club or Organization Meetings: Not on file    Marital Status: Not on file   Intimate Partner Violence:     Fear of Current or Ex-Partner: Not on file    Emotionally Abused: Not on file    Physically Abused: Not on file    Sexually Abused: Not on file   Housing Stability:     Unable to Pay for Housing in the Last Year: Not on file    Number of [place] in the Last Year: Not on file    Unstable Housing in the Last Year: Not on file       Family History   Problem Relation Age of Onset    Heart Attack Maternal Grandmother        Allergies:  Patient has no known allergies.     Home Medications:  Prior to 8 or more for level 5)      INITIAL VITALS:   /83   Pulse 68   Temp 97.6 °F (36.4 °C) (Oral)   Resp 16   SpO2 96%     Physical Exam  Vitals and nursing note reviewed. Exam conducted with a chaperone present. Constitutional:       General: He is not in acute distress. Appearance: Normal appearance. He is well-developed. He is obese. He is not ill-appearing, toxic-appearing or diaphoretic. Comments: /83   Pulse 68   Temp 97.6 °F (36.4 °C) (Oral)   Resp 16   SpO2 96%      HENT:      Head: Normocephalic and atraumatic. Right Ear: External ear normal.      Left Ear: External ear normal.   Eyes:      General: No scleral icterus. Right eye: No discharge. Left eye: No discharge. Conjunctiva/sclera: Conjunctivae normal.      Pupils: Pupils are equal, round, and reactive to light. Cardiovascular:      Rate and Rhythm: Normal rate and regular rhythm. Pulses: Normal pulses. Pulmonary:      Effort: Pulmonary effort is normal. No respiratory distress. Breath sounds: Normal breath sounds. No wheezing. Abdominal:      General: There is no distension. Palpations: Abdomen is soft. Tenderness: There is no abdominal tenderness. Musculoskeletal:         General: No swelling, tenderness, deformity or signs of injury. Cervical back: Normal range of motion and neck supple. Right lower leg: No edema. Left lower leg: No edema. Lymphadenopathy:      Cervical: No cervical adenopathy. Skin:     General: Skin is warm. Capillary Refill: Capillary refill takes less than 2 seconds. Findings: No rash. Comments: Pinpoint papular rash to flexural surfaces of bilateral wrists with scattered papules to dorsal aspects of both hands. No signs of excoriation. Lesions do not appear scabietic or do not appear weeping. The lesions are skin colored. Neurological:      General: No focal deficit present.       Mental Status: He is alert and oriented to person, place, and time. Mental status is at baseline. Psychiatric:         Mood and Affect: Mood normal.         DIFFERENTIAL  DIAGNOSIS     PLAN (LABS / IMAGING / EKG):  No orders of the defined types were placed in this encounter. MEDICATIONS ORDERED:  Orders Placed This Encounter   Medications    diphenhydrAMINE-Zinc Acetate (BENADRYL ITCH RELIEF) 2-0.1 % STCK     Sig: Apply 14 mLs topically as needed (itching)     Dispense:  1 each     Refill:  0     Ok to substitute for generic    hydrocortisone 1 % cream     Sig: Apply topically 2 -3 times daily for 5 - 7 days. Dispense:  20 g     Refill:  0       DDX: Eczematous, dermatitis herpetiformis, allergic dermatitis, atopic dermatitis, scabies    DIAGNOSTIC RESULTS / EMERGENCY DEPARTMENT COURSE / MDM   LAB RESULTS:  No results found for this visit on 11/22/21. IMPRESSION: Dermatitis    RADIOLOGY:  None    EKG    All EKG's are interpreted by the Emergency Department Physician who either signs or Co-signs this chart in the absence of a cardiologist.    EMERGENCY DEPARTMENT COURSE:  76-year-old male with significant past medical history of anxiety and depression, complex regional pain syndrome and obesity vitamin D deficiency presents for evaluation of rash present in bilateral wrists for the past 1 year. Vital signs are stable. Evaluation of rash does not appear scabietic or infectious, or with a superimposed secondary infection. Rash is papular, flesh colored, and chronic. Endorsed hay fever diagnosis as well. Rash appears eczematous, and or atopic. Given rash is non-pruritic will provided script for hydrocortisone and follow up with PCP. Patient understands this rash is non-acute and non-life threatening and agreeable to use hydrocortisone cream until follow up with primary care doctor this week. PROCEDURES:  None    CONSULTS:  None    CRITICAL CARE:  Please see attending note    FINAL IMPRESSION      1.  Rash          DISPOSITION / PLAN     DISPOSITION Decision To Discharge 11/22/2021 11:13:13 AM      PATIENT REFERRED TO:  Brianne Christiansen, APRN - NP  [id number] [employer] [phone]    In 1 week  For hospital follow-up    [facility]  [address] [place] [address]  [phone]    If symptoms worsen      DISCHARGE MEDICATIONS:  Discharge Medication List as of 11/22/2021 11:22 AM      START taking these medications    Details   diphenhydrAMINE-Zinc Acetate (BENADRYL ITCH RELIEF) 2-0.1 % STCK Apply 14 mLs topically as needed (itching), Disp-1 each, R-0Ok to substitute for genericPrint      hydrocortisone 1 % cream Apply topically 2 -3 times daily for 5 - 7 days. , Disp-20 g, R-0, Print             Wayne Reddy MD  Emergency Medicine Resident    (Please note that portions of thisnote were completed with a voice recognition program.  Efforts were made to edit the dictations but occasionally words are mis-transcribed.)       Wayne Reddy MD  Resident  11/22/21 [id number]

## 2021-11-22 NOTE — ED PROVIDER NOTES
[facility]  Emergency Department  Faculty Attestation     I performed a history and physical examination of the patient and discussed management with the resident. I reviewed the residents note and agree with the documented findings and plan of care. Any areas of disagreement are noted on the chart. I was personally present for the key portions of any procedures. I have documented in the chart those procedures where I was not present during the key portions. I have reviewed the emergency nurses triage note. I agree with the chief complaint, past medical history, past surgical history, allergies, medications, social and family history as documented unless otherwise noted below. For Physician Assistant/ Nurse Practitioner cases/documentation I have personally evaluated this patient and have completed at least one if not all key elements of the E/M (history, physical exam, and MDM). Additional findings are as noted. Primary Care Physician:  ALEXA Irwin NP    Screenings:  [unfilled]    CHIEF COMPLAINT       Chief Complaint   Patient presents with    Rash     to bilateral wrists, chronic       RECENT VITALS:   Temp: 97.6 °F (36.4 °C),  Pulse: 68, Resp: 16, BP: 123/83    LABS:  Labs Reviewed - No data to display    Radiology  No orders to display         Attending Physician Additional  Notes    Patient is had nonitchy but bumpy and slightly hyperpigmented rash on the volar aspect of both wrists as well as the dorsal aspect of both hands. He has had this for at least the past year if not longer. No history of childhood eczema. No contact exposures. No other medical concerns. On exam is afebrile nontoxic vital signs normal.  There is small less than 1 mm barely palpable papules that are hyperpigmented on the volar aspect but not so on the dorsal aspect. Impression is eczema.   Plan is topical hydrocortisone, consider diet modifications, follow-up to PCP, return precautions. Viki Verdin.  Reilly Oneil MD, [facility]  Attending Emergency  Physician                Aleida Jones MD  11/22/21 [id number]

## 2021-12-29 ENCOUNTER — TELEPHONE (OUTPATIENT)
Dept: FAMILY MEDICINE CLINIC | Age: 41
End: 2021-12-29

## 2021-12-29 NOTE — TELEPHONE ENCOUNTER
Patient stated that he has been having chest pains that feel tight for a few months. He believes he is having panic attacks. Patient denies any dizziness, sob, jaw pain, arm pain, back pain. Just c/o intermittent chest pain that he relates to panic attacks. I offered him an appt today or tomorrow and patient denied appts for this week. He asked for an appt for next week. I did inform patient that if he does start to experience the sxs listed above that he denied having, that he needs to be evaluated at an ED immediately.   Patient expressed understanding

## 2022-01-04 ENCOUNTER — OFFICE VISIT (OUTPATIENT)
Dept: FAMILY MEDICINE CLINIC | Age: 42
End: 2022-01-04
Payer: MEDICAID

## 2022-01-04 VITALS
WEIGHT: 262 LBS | HEART RATE: 84 BPM | OXYGEN SATURATION: 96 % | DIASTOLIC BLOOD PRESSURE: 76 MMHG | TEMPERATURE: 98.6 F | BODY MASS INDEX: 36.54 KG/M2 | SYSTOLIC BLOOD PRESSURE: 130 MMHG

## 2022-01-04 DIAGNOSIS — R06.02 SHORTNESS OF BREATH: ICD-10-CM

## 2022-01-04 DIAGNOSIS — R07.9 CHEST PAIN, UNSPECIFIED TYPE: Primary | ICD-10-CM

## 2022-01-04 DIAGNOSIS — E55.9 VITAMIN D DEFICIENCY: ICD-10-CM

## 2022-01-04 DIAGNOSIS — R00.2 PALPITATIONS: ICD-10-CM

## 2022-01-04 DIAGNOSIS — Z00.00 PREVENTATIVE HEALTH CARE: ICD-10-CM

## 2022-01-04 DIAGNOSIS — Z77.120 SUSPECTED EXPOSURE TO MOLD: ICD-10-CM

## 2022-01-04 PROCEDURE — G8417 CALC BMI ABV UP PARAM F/U: HCPCS | Performed by: NURSE PRACTITIONER

## 2022-01-04 PROCEDURE — G8484 FLU IMMUNIZE NO ADMIN: HCPCS | Performed by: NURSE PRACTITIONER

## 2022-01-04 PROCEDURE — G8427 DOCREV CUR MEDS BY ELIG CLIN: HCPCS | Performed by: NURSE PRACTITIONER

## 2022-01-04 PROCEDURE — 99214 OFFICE O/P EST MOD 30 MIN: CPT | Performed by: NURSE PRACTITIONER

## 2022-01-04 PROCEDURE — 1036F TOBACCO NON-USER: CPT | Performed by: NURSE PRACTITIONER

## 2022-01-04 RX ORDER — CLOTRIMAZOLE 1 %
CREAM (GRAM) TOPICAL
Qty: 45 G | Refills: 1 | Status: SHIPPED | OUTPATIENT
Start: 2022-01-04 | End: 2022-01-17

## 2022-01-04 ASSESSMENT — ENCOUNTER SYMPTOMS
BACK PAIN: 0
CHEST TIGHTNESS: 0
DIARRHEA: 0
VOMITING: 0
ABDOMINAL DISTENTION: 0
COUGH: 0
RHINORRHEA: 0
NAUSEA: 0
ABDOMINAL PAIN: 0
SORE THROAT: 0
SHORTNESS OF BREATH: 1
CONSTIPATION: 0

## 2022-01-04 NOTE — PROGRESS NOTES
Venice Goltz, APRN-CNP  [facility]  [address] [address]  [address] [address]  Dept: [phone]  Dept Fax: [phone]     PATIENT ID: Florina Flores is a 37 y.o. male. HPI:  Established pt here today for an acute visit secondary to intermittent chest pain/tightness and shortness of breath. He relates that it has been ongoing for many months. He relates that he also found out that he had mold in his apartment. He does report a lot of stressors recently but does not think that his zoloft needs to be increased. Pt denies any fever or chills. Pt today denies any HA, chest pain, or SOB. Pt denies any N/V/D/C or abdominal pain. Otherwise pt doing well on current tx and voices no other concerns. My previous office notes, labs and diagnostic studies were reviewed prior to and during encounter. The patient's past medical, surgical, social, and family history as well as current medications and allergies were reviewed as documented in today's encounter by PERLITA Jamil. Current Outpatient Medications on File Prior to Visit   Medication Sig Dispense Refill    gabapentin (NEURONTIN) 300 MG capsule Take 1 capsule by mouth 2 times daily for 30 days. Intended supply: 30 days 60 capsule 0    diclofenac sodium (VOLTAREN) 1 % GEL Apply 2 g topically 2 times daily 100 g 0    sertraline (ZOLOFT) 50 MG tablet Take 1.5 tablets by mouth daily 135 tablet 1    aspirin 325 MG EC tablet Take 1 tablet by mouth daily 42 tablet 0    docusate sodium (COLACE) 100 MG capsule Take 100 mg by mouth 2 times daily       No current facility-administered medications on file prior to visit. SUBJECTIVE:     Review of Systems   Constitutional: Negative for activity change, fatigue and fever. HENT: Negative for congestion, ear pain, rhinorrhea and sore throat. Respiratory: Positive for shortness of breath (intermittent).  Negative for cough and chest tightness. Cardiovascular: Positive for chest pain (intermittent). Negative for palpitations. Gastrointestinal: Negative for abdominal distention, abdominal pain, constipation, diarrhea, nausea and vomiting. Endocrine: Negative for polydipsia, polyphagia and polyuria. Genitourinary: Negative for difficulty urinating and dysuria. Musculoskeletal: Negative for arthralgias, back pain and myalgias. Skin: Negative for rash. Neurological: Negative for dizziness, weakness, light-headedness and headaches. Hematological: Negative for adenopathy. Psychiatric/Behavioral: Positive for dysphoric mood (stable on Zoloft 75 mg daily). Negative for agitation and behavioral problems. The patient is nervous/anxious (stable on Zoloft 75 mg daily). OBJECTIVE:      /76   Pulse 84   Temp 98.6 °F (37 °C)   Wt 262 lb (118.8 kg)   SpO2 96%   BMI 36.54 kg/m²     Physical Exam  Vitals reviewed. Constitutional:       General: He is not in acute distress. HENT:      Head: Normocephalic and atraumatic. Cardiovascular:      Comments: HR regular  Pulmonary:      Effort: Pulmonary effort is normal. No respiratory distress. Neurological:      General: No focal deficit present. Mental Status: He is alert and oriented to person, place, and time. Psychiatric:         Mood and Affect: Mood normal.       ASSESSMENT:   Diagnosis Orders   1. Chest pain, unspecified type  ECHO Complete 2D W Doppler W Color    NM MYOCARDIAL SPECT REST EXERCISE OR RX    EKG 12 lead   2. Palpitations  ECHO Complete 2D W Doppler W Color    NM MYOCARDIAL SPECT REST EXERCISE OR RX    EKG 12 lead   3. Shortness of breath  ECHO Complete 2D W Doppler W Color    NM MYOCARDIAL SPECT REST EXERCISE OR RX    EKG 12 lead   4. Vitamin D deficiency  Vitamin D 25 Hydroxy   5. Preventative health care  CBC    Comprehensive Metabolic Panel    Hemoglobin A1C    Lipid Panel   6. Suspected exposure to mold       PLAN:  1.  Chest pain, unspecified type  2. Palpitations  3. Shortness of breath  - Given above noted symptoms will proceed with cardiac workup  - EKG, stress and echo. - Will call with results  - ECHO Complete 2D W Doppler W Color; Future  - NM MYOCARDIAL SPECT REST EXERCISE OR RX; Future  - EKG 12 lead; Future    4. Vitamin D deficiency  - Stable: Medication re-filled as needed, con't medications as prescribed, con't current tx plan  - Continue Vitamin D supplements as ordered, will re-check Vitamin D level  - Please increase foods with Vitamin D, which include cheese, eggs, cereals, yogurt, milk, tofu, any type of beef, salmon or tuna,  spinach, and mushroom. - Vitamin D 25 Hydroxy; Future    5. Preventative health care  - We did discuss in detail the and the recommended preventative screening guidelines (HTN, HLD, DM, colorectal cancer & prostate cancer). - Will order above noted labs and call with results  - Will continue to follow annually and PRN    - CBC; Future  - Comprehensive Metabolic Panel; Future  - Hemoglobin A1C; Future  - Lipid Panel; Future    6. Suspected exposure to mold  - Has removed himself from the apartment  - Will continue to monitor    - Rest of systems unchanged, continue current treatments. - On this date January 4, 2022,  I have spent greater than 50% of this visit reviewing previous notes, test results and/or face to face with the patient discussing the diagnoses, importance of compliance with the treatment plan, counseling, coordinating care as well as documenting on the day of the visit.      ALEXA Westfall-CNP

## 2022-01-11 ENCOUNTER — TELEPHONE (OUTPATIENT)
Dept: FAMILY MEDICINE CLINIC | Age: 42
End: 2022-01-11

## 2022-01-11 ENCOUNTER — HOSPITAL ENCOUNTER (INPATIENT)
Age: 42
LOS: 3 days | Discharge: HOME | DRG: 053 | End: 2022-01-18
Attending: EMERGENCY MEDICINE
Payer: MEDICAID

## 2022-01-11 DIAGNOSIS — G40.909 SEIZURE DISORDER (HCC): Primary | ICD-10-CM

## 2022-01-11 DIAGNOSIS — R41.82 ALTERED MENTAL STATUS, UNSPECIFIED ALTERED MENTAL STATUS TYPE: ICD-10-CM

## 2022-01-11 DIAGNOSIS — R56.9 NEW ONSET SEIZURE (HCC): ICD-10-CM

## 2022-01-11 LAB
-: ABNORMAL
ABSOLUTE EOS #: <0.03 K/UL (ref 0–0.44)
ABSOLUTE IMMATURE GRANULOCYTE: 0.04 K/UL (ref 0–0.3)
ABSOLUTE LYMPH #: 1.48 K/UL (ref 1.1–3.7)
ABSOLUTE MONO #: 0.78 K/UL (ref 0.1–1.2)
ALBUMIN SERPL-MCNC: 4.2 G/DL (ref 3.5–5.2)
ALBUMIN/GLOBULIN RATIO: ABNORMAL (ref 1–2.5)
ALP BLD-CCNC: 60 U/L (ref 40–129)
ALT SERPL-CCNC: 26 U/L (ref 5–41)
AMORPHOUS: ABNORMAL
AMPHETAMINE SCREEN URINE: NEGATIVE
ANION GAP SERPL CALCULATED.3IONS-SCNC: 14 MMOL/L (ref 9–17)
AST SERPL-CCNC: 35 U/L
BACTERIA: ABNORMAL
BARBITURATE SCREEN URINE: NEGATIVE
BASOPHILS # BLD: 1 % (ref 0–2)
BASOPHILS ABSOLUTE: 0.07 K/UL (ref 0–0.2)
BENZODIAZEPINE SCREEN, URINE: NEGATIVE
BILIRUB SERPL-MCNC: 0.68 MG/DL (ref 0.3–1.2)
BUN BLDV-MCNC: 9 MG/DL (ref 6–20)
BUN/CREAT BLD: 12 (ref 9–20)
BUPRENORPHINE URINE: ABNORMAL
CALCIUM SERPL-MCNC: 9 MG/DL (ref 8.6–10.4)
CANNABINOID SCREEN URINE: NEGATIVE
CASTS UA: ABNORMAL /LPF
CHLORIDE BLD-SCNC: 100 MMOL/L (ref 98–107)
CO2: 25 MMOL/L (ref 20–31)
COCAINE METABOLITE, URINE: NEGATIVE
CREAT SERPL-MCNC: 0.74 MG/DL (ref 0.7–1.2)
CRYSTALS, UA: ABNORMAL /HPF
DIFFERENTIAL TYPE: ABNORMAL
EOSINOPHILS RELATIVE PERCENT: 0 % (ref 1–4)
EPITHELIAL CELLS UA: ABNORMAL /HPF (ref 0–5)
GFR AFRICAN AMERICAN: >60 ML/MIN
GFR NON-AFRICAN AMERICAN: >60 ML/MIN
GFR SERPL CREATININE-BSD FRML MDRD: ABNORMAL ML/MIN/{1.73_M2}
GFR SERPL CREATININE-BSD FRML MDRD: ABNORMAL ML/MIN/{1.73_M2}
GLUCOSE BLD-MCNC: 110 MG/DL (ref 70–99)
HCT VFR BLD CALC: 42.9 % (ref 40.7–50.3)
HEMOGLOBIN: 13.6 G/DL (ref 13–17)
IMMATURE GRANULOCYTES: 0 %
LIPASE: 14 U/L (ref 13–60)
LYMPHOCYTES # BLD: 16 % (ref 24–43)
MCH RBC QN AUTO: 26.2 PG (ref 25.2–33.5)
MCHC RBC AUTO-ENTMCNC: 31.7 G/DL (ref 28.4–34.8)
MCV RBC AUTO: 82.5 FL (ref 82.6–102.9)
MDMA URINE: ABNORMAL
METHADONE SCREEN, URINE: NEGATIVE
METHAMPHETAMINE, URINE: ABNORMAL
MONOCYTES # BLD: 8 % (ref 3–12)
MUCUS: ABNORMAL
NRBC AUTOMATED: 0 PER 100 WBC
OPIATES, URINE: NEGATIVE
OTHER OBSERVATIONS UA: ABNORMAL
OXYCODONE SCREEN URINE: POSITIVE
PDW BLD-RTO: 13.3 % (ref 11.8–14.4)
PHENCYCLIDINE, URINE: NEGATIVE
PLATELET # BLD: 308 K/UL (ref 138–453)
PLATELET ESTIMATE: ABNORMAL
PMV BLD AUTO: 10.6 FL (ref 8.1–13.5)
POTASSIUM SERPL-SCNC: 3.6 MMOL/L (ref 3.7–5.3)
PROPOXYPHENE, URINE: ABNORMAL
RBC # BLD: 5.2 M/UL (ref 4.21–5.77)
RBC # BLD: ABNORMAL 10*6/UL
RBC UA: ABNORMAL /HPF (ref 0–2)
RENAL EPITHELIAL, UA: ABNORMAL /HPF
SEG NEUTROPHILS: 75 % (ref 36–65)
SEGMENTED NEUTROPHILS ABSOLUTE COUNT: 7 K/UL (ref 1.5–8.1)
SODIUM BLD-SCNC: 139 MMOL/L (ref 135–144)
TEST INFORMATION: ABNORMAL
TOTAL PROTEIN: 7.7 G/DL (ref 6.4–8.3)
TRICHOMONAS: ABNORMAL
TRICYCLIC ANTIDEPRESSANTS, UR: ABNORMAL
WBC # BLD: 9.4 K/UL (ref 3.5–11.3)
WBC # BLD: ABNORMAL 10*3/UL
WBC UA: ABNORMAL /HPF (ref 0–5)
YEAST: ABNORMAL

## 2022-01-11 PROCEDURE — 81001 URINALYSIS AUTO W/SCOPE: CPT

## 2022-01-11 PROCEDURE — 2580000003 HC RX 258: Performed by: PHYSICIAN ASSISTANT

## 2022-01-11 PROCEDURE — 96374 THER/PROPH/DIAG INJ IV PUSH: CPT

## 2022-01-11 PROCEDURE — 80307 DRUG TEST PRSMV CHEM ANLYZR: CPT

## 2022-01-11 PROCEDURE — 96375 TX/PRO/DX INJ NEW DRUG ADDON: CPT

## 2022-01-11 PROCEDURE — 83690 ASSAY OF LIPASE: CPT

## 2022-01-11 PROCEDURE — 99284 EMERGENCY DEPT VISIT MOD MDM: CPT

## 2022-01-11 PROCEDURE — 6360000002 HC RX W HCPCS: Performed by: PHYSICIAN ASSISTANT

## 2022-01-11 PROCEDURE — 85025 COMPLETE CBC W/AUTO DIFF WBC: CPT

## 2022-01-11 PROCEDURE — 80053 COMPREHEN METABOLIC PANEL: CPT

## 2022-01-11 PROCEDURE — 83036 HEMOGLOBIN GLYCOSYLATED A1C: CPT

## 2022-01-11 RX ORDER — ACETAMINOPHEN 500 MG
1000 TABLET ORAL EVERY 6 HOURS PRN
COMMUNITY

## 2022-01-11 RX ORDER — 0.9 % SODIUM CHLORIDE 0.9 %
1000 INTRAVENOUS SOLUTION INTRAVENOUS ONCE
Status: COMPLETED | OUTPATIENT
Start: 2022-01-11 | End: 2022-01-11

## 2022-01-11 RX ORDER — ONDANSETRON 2 MG/ML
4 INJECTION INTRAMUSCULAR; INTRAVENOUS ONCE
Status: COMPLETED | OUTPATIENT
Start: 2022-01-11 | End: 2022-01-11

## 2022-01-11 RX ADMIN — ONDANSETRON 4 MG: 2 INJECTION INTRAMUSCULAR; INTRAVENOUS at 21:02

## 2022-01-11 RX ADMIN — SODIUM CHLORIDE 1000 ML: 9 INJECTION, SOLUTION INTRAVENOUS at 21:03

## 2022-01-11 NOTE — TELEPHONE ENCOUNTER
Please let patient's girlfriend know that I did go back and look at his HIPPA disclosure form that was scanned into our system in August of 2021. It is clearly marked not to provide health information to anyone but him. I will not be discussing his care with her.

## 2022-01-11 NOTE — TELEPHONE ENCOUNTER
Call place to caller and I informed her that no information would be given due to HIPPA. Caller then stated that pt has had multiple episodes and that he was now under her care. When asked if pt had brought up the issues to PCP at last appointment she stated she will find an  to take action as well as find a new provider and then hung up.

## 2022-01-11 NOTE — TELEPHONE ENCOUNTER
Patient's girlfriend called and stated that patient is being transported to [facility] due to irregular speech, unable to breath correctly, confusion, and anxiety. She said that she has had a very difficult time getting through to our office and she is very upset. She believes that one of the medications that Elias Weiss had prescribed is causing all of patient's symptoms. She would like a call back from [facility], and Federico Beckman CNP ONLY. She said if not, she will take further action.

## 2022-01-11 NOTE — LETTER
[facility] ED  [address] [id number]  Phone: [phone]             January 12, 2022    Patient:    Date of Birth:    Date of Visit: 1/11/2022       To Whom It May Concern:    Katenikolasbjorn Aguilar was in our emergency department on 1/11/2022-1/12/2022      Sincerely,       [facility]

## 2022-01-11 NOTE — TELEPHONE ENCOUNTER
Patient had a seizure about 48 hours ago, and called the ambulance and they stated \"there was no need for him to go to the ER\" and asked a few questions over the phone to make sure he was okay. Patient feels he still needs to be seen by PCP. Please advise.

## 2022-01-12 ENCOUNTER — APPOINTMENT (OUTPATIENT)
Dept: MRI IMAGING | Age: 42
DRG: 053 | End: 2022-01-12
Payer: MEDICAID

## 2022-01-12 PROBLEM — E87.6 HYPOKALEMIA: Status: ACTIVE | Noted: 2022-01-12

## 2022-01-12 PROBLEM — R56.9 SEIZURE (HCC): Status: ACTIVE | Noted: 2022-01-12

## 2022-01-12 LAB
AMMONIA: 33 UMOL/L (ref 16–60)
BILIRUBIN URINE: ABNORMAL
COLOR: YELLOW
COMMENT UA: ABNORMAL
ESTIMATED AVERAGE GLUCOSE: 100 MG/DL
FOLATE: 14.8 NG/ML
GLUCOSE URINE: NEGATIVE
HBA1C MFR BLD: 5.1 % (ref 4–6)
KETONES, URINE: ABNORMAL
LEUKOCYTE ESTERASE, URINE: NEGATIVE
NITRITE, URINE: NEGATIVE
PH UA: 6 (ref 5–8)
PROCALCITONIN: 0.06 NG/ML
PROTEIN UA: ABNORMAL
SPECIFIC GRAVITY UA: 1.04 (ref 1–1.03)
TURBIDITY: CLEAR
URINE HGB: ABNORMAL
UROBILINOGEN, URINE: NORMAL
VITAMIN B-12: 685 PG/ML (ref 232–1245)
VITAMIN D 25-HYDROXY: 10.1 NG/ML (ref 30–100)

## 2022-01-12 PROCEDURE — 97535 SELF CARE MNGMENT TRAINING: CPT

## 2022-01-12 PROCEDURE — APPSS45 APP SPLIT SHARED TIME 31-45 MINUTES: Performed by: NURSE PRACTITIONER

## 2022-01-12 PROCEDURE — G0378 HOSPITAL OBSERVATION PER HR: HCPCS

## 2022-01-12 PROCEDURE — 6360000002 HC RX W HCPCS: Performed by: INTERNAL MEDICINE

## 2022-01-12 PROCEDURE — 6370000000 HC RX 637 (ALT 250 FOR IP): Performed by: PSYCHIATRY & NEUROLOGY

## 2022-01-12 PROCEDURE — 96375 TX/PRO/DX INJ NEW DRUG ADDON: CPT

## 2022-01-12 PROCEDURE — 82746 ASSAY OF FOLIC ACID SERUM: CPT

## 2022-01-12 PROCEDURE — 97116 GAIT TRAINING THERAPY: CPT

## 2022-01-12 PROCEDURE — 36415 COLL VENOUS BLD VENIPUNCTURE: CPT

## 2022-01-12 PROCEDURE — 2580000003 HC RX 258: Performed by: NURSE PRACTITIONER

## 2022-01-12 PROCEDURE — 97163 PT EVAL HIGH COMPLEX 45 MIN: CPT

## 2022-01-12 PROCEDURE — 82607 VITAMIN B-12: CPT

## 2022-01-12 PROCEDURE — 90792 PSYCH DIAG EVAL W/MED SRVCS: CPT | Performed by: PSYCHIATRY & NEUROLOGY

## 2022-01-12 PROCEDURE — 84145 PROCALCITONIN (PCT): CPT

## 2022-01-12 PROCEDURE — 82306 VITAMIN D 25 HYDROXY: CPT

## 2022-01-12 PROCEDURE — 97167 OT EVAL HIGH COMPLEX 60 MIN: CPT

## 2022-01-12 PROCEDURE — 99244 OFF/OP CNSLTJ NEW/EST MOD 40: CPT | Performed by: PSYCHIATRY & NEUROLOGY

## 2022-01-12 PROCEDURE — 99222 1ST HOSP IP/OBS MODERATE 55: CPT | Performed by: INTERNAL MEDICINE

## 2022-01-12 PROCEDURE — 96372 THER/PROPH/DIAG INJ SC/IM: CPT

## 2022-01-12 PROCEDURE — 82140 ASSAY OF AMMONIA: CPT

## 2022-01-12 PROCEDURE — 95816 EEG AWAKE AND DROWSY: CPT | Performed by: PSYCHIATRY & NEUROLOGY

## 2022-01-12 PROCEDURE — 6360000002 HC RX W HCPCS: Performed by: NURSE PRACTITIONER

## 2022-01-12 PROCEDURE — 70551 MRI BRAIN STEM W/O DYE: CPT

## 2022-01-12 PROCEDURE — G0378 HOSPITAL OBSERVATION PER HR: HCPCS | Performed by: NURSE PRACTITIONER

## 2022-01-12 PROCEDURE — 6370000000 HC RX 637 (ALT 250 FOR IP): Performed by: NURSE PRACTITIONER

## 2022-01-12 PROCEDURE — 95819 EEG AWAKE AND ASLEEP: CPT

## 2022-01-12 RX ORDER — POTASSIUM CHLORIDE 20 MEQ/1
40 TABLET, EXTENDED RELEASE ORAL PRN
Status: DISCONTINUED | OUTPATIENT
Start: 2022-01-12 | End: 2022-01-18 | Stop reason: HOSPADM

## 2022-01-12 RX ORDER — POTASSIUM CHLORIDE 20 MEQ/1
40 TABLET, EXTENDED RELEASE ORAL ONCE
Status: COMPLETED | OUTPATIENT
Start: 2022-01-12 | End: 2022-01-12

## 2022-01-12 RX ORDER — LORAZEPAM 2 MG/ML
1 INJECTION INTRAMUSCULAR ONCE
Status: COMPLETED | OUTPATIENT
Start: 2022-01-12 | End: 2022-01-12

## 2022-01-12 RX ORDER — POTASSIUM CHLORIDE 7.45 MG/ML
10 INJECTION INTRAVENOUS PRN
Status: DISCONTINUED | OUTPATIENT
Start: 2022-01-12 | End: 2022-01-18 | Stop reason: HOSPADM

## 2022-01-12 RX ORDER — SODIUM CHLORIDE 9 MG/ML
25 INJECTION, SOLUTION INTRAVENOUS PRN
Status: DISCONTINUED | OUTPATIENT
Start: 2022-01-12 | End: 2022-01-18 | Stop reason: HOSPADM

## 2022-01-12 RX ORDER — LAMOTRIGINE 25 MG/1
25 TABLET ORAL DAILY
Status: DISCONTINUED | OUTPATIENT
Start: 2022-01-12 | End: 2022-01-13

## 2022-01-12 RX ORDER — SODIUM CHLORIDE 9 MG/ML
INJECTION, SOLUTION INTRAVENOUS CONTINUOUS
Status: ACTIVE | OUTPATIENT
Start: 2022-01-12 | End: 2022-01-12

## 2022-01-12 RX ORDER — MAGNESIUM SULFATE 1 G/100ML
1000 INJECTION INTRAVENOUS PRN
Status: DISCONTINUED | OUTPATIENT
Start: 2022-01-12 | End: 2022-01-18 | Stop reason: HOSPADM

## 2022-01-12 RX ORDER — OXYCODONE AND ACETAMINOPHEN 10; 325 MG/1; MG/1
1 TABLET ORAL 2 TIMES DAILY PRN
Status: ON HOLD | COMMUNITY
End: 2022-01-17 | Stop reason: HOSPADM

## 2022-01-12 RX ORDER — ONDANSETRON 4 MG/1
4 TABLET, ORALLY DISINTEGRATING ORAL EVERY 8 HOURS PRN
Status: DISCONTINUED | OUTPATIENT
Start: 2022-01-12 | End: 2022-01-18 | Stop reason: HOSPADM

## 2022-01-12 RX ORDER — POLYETHYLENE GLYCOL 3350 17 G/17G
17 POWDER, FOR SOLUTION ORAL DAILY PRN
Status: DISCONTINUED | OUTPATIENT
Start: 2022-01-12 | End: 2022-01-18 | Stop reason: HOSPADM

## 2022-01-12 RX ORDER — ERGOCALCIFEROL 1.25 MG/1
50000 CAPSULE ORAL WEEKLY
Status: DISCONTINUED | OUTPATIENT
Start: 2022-01-12 | End: 2022-01-18 | Stop reason: HOSPADM

## 2022-01-12 RX ORDER — ONDANSETRON 2 MG/ML
4 INJECTION INTRAMUSCULAR; INTRAVENOUS EVERY 6 HOURS PRN
Status: DISCONTINUED | OUTPATIENT
Start: 2022-01-12 | End: 2022-01-18 | Stop reason: HOSPADM

## 2022-01-12 RX ORDER — ACETAMINOPHEN 650 MG/1
650 SUPPOSITORY RECTAL EVERY 6 HOURS PRN
Status: DISCONTINUED | OUTPATIENT
Start: 2022-01-12 | End: 2022-01-18 | Stop reason: HOSPADM

## 2022-01-12 RX ORDER — ACETAMINOPHEN 325 MG/1
650 TABLET ORAL EVERY 6 HOURS PRN
Status: DISCONTINUED | OUTPATIENT
Start: 2022-01-12 | End: 2022-01-18 | Stop reason: HOSPADM

## 2022-01-12 RX ORDER — SODIUM CHLORIDE 0.9 % (FLUSH) 0.9 %
10 SYRINGE (ML) INJECTION PRN
Status: DISCONTINUED | OUTPATIENT
Start: 2022-01-12 | End: 2022-01-18 | Stop reason: HOSPADM

## 2022-01-12 RX ORDER — SODIUM CHLORIDE 0.9 % (FLUSH) 0.9 %
5-40 SYRINGE (ML) INJECTION EVERY 12 HOURS SCHEDULED
Status: DISCONTINUED | OUTPATIENT
Start: 2022-01-12 | End: 2022-01-18 | Stop reason: HOSPADM

## 2022-01-12 RX ADMIN — POTASSIUM CHLORIDE 40 MEQ: 1500 TABLET, EXTENDED RELEASE ORAL at 01:41

## 2022-01-12 RX ADMIN — SODIUM CHLORIDE, PRESERVATIVE FREE 10 ML: 5 INJECTION INTRAVENOUS at 08:11

## 2022-01-12 RX ADMIN — SODIUM CHLORIDE: 9 INJECTION, SOLUTION INTRAVENOUS at 01:49

## 2022-01-12 RX ADMIN — LAMOTRIGINE 25 MG: 25 TABLET ORAL at 17:24

## 2022-01-12 RX ADMIN — SODIUM CHLORIDE, PRESERVATIVE FREE 10 ML: 5 INJECTION INTRAVENOUS at 21:32

## 2022-01-12 RX ADMIN — LORAZEPAM 1 MG: 2 INJECTION INTRAMUSCULAR; INTRAVENOUS at 14:48

## 2022-01-12 RX ADMIN — ENOXAPARIN SODIUM 30 MG: 100 INJECTION SUBCUTANEOUS at 21:32

## 2022-01-12 RX ADMIN — SODIUM CHLORIDE: 9 INJECTION, SOLUTION INTRAVENOUS at 08:13

## 2022-01-12 RX ADMIN — ENOXAPARIN SODIUM 30 MG: 100 INJECTION SUBCUTANEOUS at 08:12

## 2022-01-12 ASSESSMENT — PAIN DESCRIPTION - LOCATION: LOCATION: SHOULDER

## 2022-01-12 ASSESSMENT — PAIN DESCRIPTION - ORIENTATION: ORIENTATION: RIGHT;LEFT

## 2022-01-12 ASSESSMENT — PAIN DESCRIPTION - PAIN TYPE: TYPE: ACUTE PAIN

## 2022-01-12 ASSESSMENT — ENCOUNTER SYMPTOMS
BACK PAIN: 0
EYE PAIN: 0
PHOTOPHOBIA: 0
GASTROINTESTINAL NEGATIVE: 1
CHOKING: 1
SHORTNESS OF BREATH: 1

## 2022-01-12 ASSESSMENT — PAIN SCALES - GENERAL: PAINLEVEL_OUTOF10: 7

## 2022-01-12 NOTE — CARE COORDINATION
Case Management Initial Discharge Plan  Doris Saavedra,         Readmission Risk              Risk of Unplanned Readmission:  0             Met with:patient or family member patient and daughter to discuss discharge plans. Information verified: address, contacts, phone number, , insurance Yes. Address corrected with Central Registration  PCP: ALEXA Stout NP  Date of last visit: ? Insurance Provider: [place]    Discharge Planning  Current Residence:  Private home  Living Arrangements:    lives with alla and their 15year old daughter  Home has 2 stories/3 outside stairs to climb. Bathroom on first floor and bedroom on second floor. Support Systems:     Current Services PTA:  none Agency: none  Patient able to perform ADL's:Assisted  DME in home:  none  DME used to aid ambulation prior to admission:   none  DME used during admission:  TBD    Potential Assistance Needed:       Pharmacy: ? Potential Assistance Purchasing Medications:     Does patient want to participate in local refill/ meds to beds program?  Yes    Patient agreeable to home care: unsure  [place] of choice provided:  no      Type of Home Care Services:     Patient expects to be discharged to:       Prior SNF/Rehab Placement and Facility: ?  Agreeable to SNF/Rehab: not able to make decisions  Freedom of choice provided: no   Evaluation: no    Expected Discharge date: Follow Up Appointment: Best Day/ Time:      Transportation provider: family  Transportation arrangements needed for discharge: No    Discharge Plan:   Met with patient earlier in the day and no family present and pt unable to answer questions. Phone call to [address] [place] and she is on her way in. Went back to pt's room to meet with Alfredo Chavez but she is on the phone and asks that I return later. Went back for third visit to pt's room and Alfredo Chavez had to leave.   Pt's 15year old daughter Rachel Roberto at bedside and she was able to answer some questions. She states her dad has not worked for the past 3 years. She recognizes that he is confused with answering most questions though he recognizes her. Telepsych has been completed. No recommendations until neuro consult completed. MRI brain done waiting on results. ? Cause of seizures. PLAN  Follow up with consults to help formulate a plan of care.         Electronically signed by Teofilo Mina on 1/12/22 at 4:05 PM EST

## 2022-01-12 NOTE — ED PROVIDER NOTES
eMERGENCY dEPARTMENT eNCOUnter   [address] Name: Diana Dill  MRN: [id number]  [place] 1980  Date of evaluation: 1/11/22     Diana Dill is a 37 y.o. male with CC: Altered Mental Status      Based on the medical record the care appears appropriate. I was personally available for consultation in the Emergency Department.   The care is provided during an unprecedented national emergency due to the novel coronavirus, Irving Ernst MD  Attending Emergency Physician                    Barb Gallardo MD  01/11/22 5036
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
## 2022-01-12 NOTE — PROGRESS NOTES
Consult to neurology sent through [facility] to Dr Olive Ross. Consult sent to psychiatry through [facility] to on call charge nurse.
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
## 2022-01-12 NOTE — PROGRESS NOTES
Pt transferred from ER per wheelchair; pt ambulated to bed per self; gait steady. Pt is alert and oriented at this time; aware that he is at a [facility] and why; alert to self. Knows that it is January but unable to state the year. Denies pain. Pt drowsy initially but arouses easily. Reviewed past medical history and home medications with unclear answers. Pt states he is very tired; he has not slept in a few days; states he is helping his mother move. When asked what made him decide to seek medical attention today - pt stated he had an anxiety attack; had difficulty swallowing and SOB. No respiratory distress noted. Pt is able to swallow without difficulty; 40 mEq Potassium administered without difficulty. Pt instructed to call out for assist/prn; bed alarm on for safety.

## 2022-01-12 NOTE — PROGRESS NOTES
Pt taken to MRI via bed assisted by transporter.    Electronically signed by Makenna Zepeda RN on 1/12/2022 at 3:27 PM

## 2022-01-12 NOTE — FLOWSHEET NOTE
Patient was sitting up in bed with sitter present along with 2 family members. Family members engaged in conversation and share they're unsure of the medical problem the patient is having. Family member share his behavior is out of the norm and when he speaks nothing makes sense. This type of behavior has never happened before. Writer provided words of support and comfort, and stated he will pray for answers along with a medical  treatment that will correct the problem. Family members were grateful for the visit and prayers. Spiritual care will follow up as needed or requested. 01/12/22 1621   Encounter Summary   Services provided to: Patient   Referral/Consult From: [facility] Family members   Continue Visiting   (1/12/2022)   Complexity of Encounter Low   Length of Encounter 15 minutes   Spiritual Assessment Completed Yes   Routine   Type Initial   Assessment Calm; Approachable   Intervention Active listening;Discussed illness/injury and it's impact   Outcome Expressed gratitude;Engaged in conversation
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
## 2022-01-12 NOTE — PROGRESS NOTES
Pt stated that \"there are many overflowing sinks, there is a lot of water going over around here. \"  Writer asked pt if he had to use the restroom, pt replied yes and used urinal.

## 2022-01-12 NOTE — PLAN OF CARE
Problem: Falls - Risk of:  Goal: Will remain free from falls  Description: Will remain free from falls  Outcome: Ongoing  Goal: Absence of physical injury  Description: Absence of physical injury  Outcome: Ongoing     Problem: Safety:  Goal: Free from accidental physical injury  Description: Free from accidental physical injury  Outcome: Ongoing  Goal: Free from intentional harm  Description: Free from intentional harm  Outcome: Ongoing  Goal: Ability to remain free from injury will improve  Description: Ability to remain free from injury will improve  Outcome: Ongoing     Problem: Pain:  Goal: Pain level will decrease  Description: Pain level will decrease  Outcome: Ongoing  Goal: Control of acute pain  Description: Control of acute pain  Outcome: Ongoing     Problem: Health Behavior:  Goal: Ability to manage health-related needs will improve  Description: Ability to manage health-related needs will improve  Outcome: Ongoing     Problem: Physical Regulation:  Goal: Signs of adequate cerebral perfusion will increase  Description: Signs of adequate cerebral perfusion will increase  Outcome: Ongoing  Goal: Ability to maintain a stable neurologic state will improve  Description: Ability to maintain a stable neurologic state will improve  Outcome: Ongoing     Problem: Self-Concept:  Goal: Level of anxiety will decrease  Description: Level of anxiety will decrease  Outcome: Ongoing  Goal: Ability to verbalize feelings about condition will improve  Description: Ability to verbalize feelings about condition will improve  Outcome: Ongoing

## 2022-01-12 NOTE — PROGRESS NOTES
Transitions of Care Pharmacy Service   Medication Review    The patient's list of current home medications has been reviewed. He was not in the room when I came by, and the visitor who was in the room did not know his home meds other than he takes 'something for anxiety.' His meds are prescribed in Epic, and I have reviewed his refill history. Source(s) of information: Epic, [facility]AS, [employer] refill report, [place], [employer]      Other Notes Zoloft: appears noncompliant -- prescribed 75mg daily (50mg tab x 1.5 tabs daily) by PCP. He last filled a 90-day supply in June 2021. Gabapentin was prescribed 300mg BID in Aug 2021 for chronic ankle pain by pain management; he last filled a 30-day supply on 9/6/21. He last filled Percocet 10/325mg x 60 tabs on 6/11/21. Pain management did not recommend the continued used of opioids at the office visit in Aug 2021. Unable to assess his use of OTC products at this time. Please feel free to call me with any questions about this encounter. Thank you. [facility]   Transitions of Care Pharmacy Service  Phone:  [phone]  Fax: [phone]      Electronically signed by [facility] on 1/12/2022 at 3:20 PM           Medications Prior to Admission:   oxyCODONE-acetaminophen (PERCOCET)  MG per tablet, Take 1 tablet by mouth 2 times daily as needed for Pain.   acetaminophen (TYLENOL) 500 MG tablet, Take 1,000 mg by mouth every 6 hours as needed  sertraline (ZOLOFT) 50 MG tablet, Take 1.5 tablets by mouth daily

## 2022-01-12 NOTE — PROGRESS NOTES
Patient weight is between 101-149kg. For prophylaxis with Enoxaparin, Pharmacy adjusted the dose to account for the patient's increased body weight in accordance with hospital approved protocol. The dose has been changed to 30mg BID. Please contact pharmacy with any concerns @ [phone]. Thank you.    Georgina Vasquez, [facility]  1/12/2022 1:17 AM

## 2022-01-12 NOTE — PROGRESS NOTES
Mother of richardnevaeh at bedside and stated that pt is \"talking like he is talking to someone but noone is here. \"

## 2022-01-13 ENCOUNTER — APPOINTMENT (OUTPATIENT)
Dept: GENERAL RADIOLOGY | Age: 42
DRG: 053 | End: 2022-01-13
Payer: MEDICAID

## 2022-01-13 LAB
ANION GAP SERPL CALCULATED.3IONS-SCNC: 13 MMOL/L (ref 9–17)
BUN BLDV-MCNC: 9 MG/DL (ref 6–20)
BUN/CREAT BLD: 13 (ref 9–20)
CALCIUM SERPL-MCNC: 9.1 MG/DL (ref 8.6–10.4)
CHLORIDE BLD-SCNC: 104 MMOL/L (ref 98–107)
CO2: 23 MMOL/L (ref 20–31)
CREAT SERPL-MCNC: 0.71 MG/DL (ref 0.7–1.2)
GFR AFRICAN AMERICAN: >60 ML/MIN
GFR NON-AFRICAN AMERICAN: >60 ML/MIN
GFR SERPL CREATININE-BSD FRML MDRD: ABNORMAL ML/MIN/{1.73_M2}
GFR SERPL CREATININE-BSD FRML MDRD: ABNORMAL ML/MIN/{1.73_M2}
GLUCOSE BLD-MCNC: 107 MG/DL (ref 70–99)
GLUCOSE BLD-MCNC: 126 MG/DL (ref 75–110)
HCT VFR BLD CALC: 42.2 % (ref 40.7–50.3)
HEMOGLOBIN: 13.2 G/DL (ref 13–17)
MCH RBC QN AUTO: 25.9 PG (ref 25.2–33.5)
MCHC RBC AUTO-ENTMCNC: 31.3 G/DL (ref 28.4–34.8)
MCV RBC AUTO: 82.7 FL (ref 82.6–102.9)
NRBC AUTOMATED: 0 PER 100 WBC
PDW BLD-RTO: 13.4 % (ref 11.8–14.4)
PLATELET # BLD: 307 K/UL (ref 138–453)
PMV BLD AUTO: 11.3 FL (ref 8.1–13.5)
POTASSIUM SERPL-SCNC: 4 MMOL/L (ref 3.7–5.3)
RBC # BLD: 5.1 M/UL (ref 4.21–5.77)
SODIUM BLD-SCNC: 140 MMOL/L (ref 135–144)
WBC # BLD: 9 K/UL (ref 3.5–11.3)

## 2022-01-13 PROCEDURE — 71045 X-RAY EXAM CHEST 1 VIEW: CPT

## 2022-01-13 PROCEDURE — 99226 PR SBSQ OBSERVATION CARE/DAY 35 MINUTES: CPT | Performed by: PSYCHIATRY & NEUROLOGY

## 2022-01-13 PROCEDURE — 2580000003 HC RX 258: Performed by: FAMILY MEDICINE

## 2022-01-13 PROCEDURE — 96372 THER/PROPH/DIAG INJ SC/IM: CPT

## 2022-01-13 PROCEDURE — 85027 COMPLETE CBC AUTOMATED: CPT

## 2022-01-13 PROCEDURE — 96365 THER/PROPH/DIAG IV INF INIT: CPT

## 2022-01-13 PROCEDURE — 2500000003 HC RX 250 WO HCPCS: Performed by: FAMILY MEDICINE

## 2022-01-13 PROCEDURE — G0378 HOSPITAL OBSERVATION PER HR: HCPCS

## 2022-01-13 PROCEDURE — 6370000000 HC RX 637 (ALT 250 FOR IP): Performed by: PSYCHIATRY & NEUROLOGY

## 2022-01-13 PROCEDURE — 97116 GAIT TRAINING THERAPY: CPT

## 2022-01-13 PROCEDURE — 6360000002 HC RX W HCPCS: Performed by: NURSE PRACTITIONER

## 2022-01-13 PROCEDURE — 82947 ASSAY GLUCOSE BLOOD QUANT: CPT

## 2022-01-13 PROCEDURE — 36415 COLL VENOUS BLD VENIPUNCTURE: CPT

## 2022-01-13 PROCEDURE — 99233 SBSQ HOSP IP/OBS HIGH 50: CPT | Performed by: FAMILY MEDICINE

## 2022-01-13 PROCEDURE — 97530 THERAPEUTIC ACTIVITIES: CPT

## 2022-01-13 PROCEDURE — 97112 NEUROMUSCULAR REEDUCATION: CPT

## 2022-01-13 PROCEDURE — 2580000003 HC RX 258: Performed by: NURSE PRACTITIONER

## 2022-01-13 PROCEDURE — 6370000000 HC RX 637 (ALT 250 FOR IP): Performed by: NURSE PRACTITIONER

## 2022-01-13 PROCEDURE — 97535 SELF CARE MNGMENT TRAINING: CPT

## 2022-01-13 PROCEDURE — 80048 BASIC METABOLIC PNL TOTAL CA: CPT

## 2022-01-13 RX ORDER — LORAZEPAM 2 MG/ML
1 INJECTION INTRAMUSCULAR EVERY 6 HOURS PRN
Status: DISCONTINUED | OUTPATIENT
Start: 2022-01-13 | End: 2022-01-18 | Stop reason: HOSPADM

## 2022-01-13 RX ORDER — DIVALPROEX SODIUM 500 MG/1
500 TABLET, EXTENDED RELEASE ORAL 2 TIMES DAILY
Status: DISCONTINUED | OUTPATIENT
Start: 2022-01-13 | End: 2022-01-14

## 2022-01-13 RX ORDER — DIVALPROEX SODIUM 500 MG/1
500 TABLET, EXTENDED RELEASE ORAL 2 TIMES DAILY
Status: DISCONTINUED | OUTPATIENT
Start: 2022-01-13 | End: 2022-01-13 | Stop reason: SDUPTHER

## 2022-01-13 RX ORDER — LORAZEPAM 2 MG/ML
2 INJECTION INTRAMUSCULAR EVERY 6 HOURS PRN
Status: DISCONTINUED | OUTPATIENT
Start: 2022-01-13 | End: 2022-01-13

## 2022-01-13 RX ADMIN — SODIUM CHLORIDE, PRESERVATIVE FREE 10 ML: 5 INJECTION INTRAVENOUS at 20:52

## 2022-01-13 RX ADMIN — ERGOCALCIFEROL 50000 UNITS: 1.25 CAPSULE ORAL at 08:11

## 2022-01-13 RX ADMIN — SODIUM CHLORIDE, PRESERVATIVE FREE 10 ML: 5 INJECTION INTRAVENOUS at 08:11

## 2022-01-13 RX ADMIN — LAMOTRIGINE 25 MG: 25 TABLET ORAL at 08:11

## 2022-01-13 RX ADMIN — ENOXAPARIN SODIUM 30 MG: 100 INJECTION SUBCUTANEOUS at 08:11

## 2022-01-13 RX ADMIN — DIVALPROEX SODIUM 500 MG: 500 TABLET, EXTENDED RELEASE ORAL at 20:52

## 2022-01-13 RX ADMIN — VALPROATE SODIUM 1000 MG: 100 INJECTION, SOLUTION INTRAVENOUS at 18:15

## 2022-01-13 RX ADMIN — ENOXAPARIN SODIUM 30 MG: 100 INJECTION SUBCUTANEOUS at 20:52

## 2022-01-13 ASSESSMENT — ENCOUNTER SYMPTOMS
ABDOMINAL PAIN: 0
BLOOD IN STOOL: 0
VOMITING: 0
CHEST TIGHTNESS: 0
NAUSEA: 0
COUGH: 0
DIARRHEA: 0
CONSTIPATION: 0
WHEEZING: 0
SHORTNESS OF BREATH: 0
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
## 2022-01-13 NOTE — PROGRESS NOTES
Writer called to desk d/t family member being on phone for an update. Writer came to the desk an answered phone call, Riccardo Ascencio on the phone. Writer updated Alonzo Phillips that pt is sitting up in chair, watching television, and that writer was told that pt appears to be tired today. Alonzo Phillips asked Charu Caraballo if pt had been washed up today.  updated Alonzo Phillips that night shift RN had reported that pt had urinated in his bed and was washed up last night. Alonzo Phillips went on to ask if specific medication had made him urinate in the bed, Lamictal was started by neurologist.  Margaret Sweet told Alonzo Phillips that report was given that pt was very tired last night and could have been sleeping deeply and writer could speak 100% that medication caused the pt to urinate in his bed as pt is also confused. Alonzo Phillips stated, \"wait a minute you are his nurse correct, you should know exactly what is making this happen, the man has not done this for years, a new medication was started and then he wet the bed so obviously that it what it is from, maybe you are not the person I should be speaking with, you are his nurse right? \"  Writer again stated that pt was sleeping, is confused, and writer could not tell her that pt urinating in his bed was 100% from new medication. Alonzo Phillips demanded to speak to writer's supervisor. Writer contacted [facility] Candida Marks, unit manager.    Electronically signed by Kathleen Jerry RN on 1/13/2022 at 1:39 PM

## 2022-01-13 NOTE — PROGRESS NOTES
NEUROLOGY INPATIENT PROGRESS NOTE    Patient- Nathanael Paul    MRN -  [id number]   Acct # - [de-identified]      - 1980    37 y.o. Subjective: The patient is seen as follow up for seizures. Patient is alert at this time. Around 5:15pm pt had another witnessed seizure last 2 mins and he becames post ictal for 5 mins, I witnessed his postictal state    Overall, pt is still much better today compare to yesterday, confirmed by his family      Overnight, he was found urinating in the bed    Objective:   /79   Pulse 73   Temp 98.2 °F (36.8 °C) (Oral)   Resp 16   Ht 5' 6\" (1.676 m)   Wt 250 lb (113.4 kg)   SpO2 96%   BMI 40.35 kg/m²     Intake/Output Summary (Last 24 hours) at 2022 1812  Last data filed at 2022 1427  Gross per 24 hour   Intake    Output 280 ml   Net -280 ml       Physical Exam:  General:  Awake, up in chair,  not in distress. Language is intact. AAO X4  HEENT: pink conjunctiva, unicteric sclera, moist oral mucosa. There is no neck lymphadenopathy. Neck: There is no carotid bruits. The Neck is supple. Neuro: CN 2-12 grossly intact with no focal deficits. Power 5/5 Throughout symmetric,  Long tracts are intact. Cerebellar exam is Intact. Sensory exam is intact to light touch. Gait is normal. No abnormal movement. Osteo: There is no LROM of any of the 4 extremity joints, no joint tenderness. Leg edema none  Skin: no lesions, no rash, warm and moist to touch. Abdomen is soft intact bowel sounds. ROS:    Cardiac: no chest pain. No palpitations.   Renal : no flank pain, no hematuria  Skin: no rash    Medications:     valproate sodium (DEPACON) IVPB  1,000 mg IntraVENous Once    divalproex  500 mg Oral BID    divalproex  500 mg Oral BID    sodium chloride flush  5-40 mL IntraVENous 2 times per day    enoxaparin  30 mg SubCUTAneous BID    vitamin D  50,000 Units Oral Weekly       Data:   CBC:   Recent Labs     22  2100 01/13/22  0628   WBC 9.4 9.0   HGB 13.6 13.2    307     BMP:    Recent Labs     01/11/22  2100 01/13/22  0628    140   K 3.6* 4.0    104   CO2 25 23   BUN 9 9   CREATININE 0.74 0.71   GLUCOSE 110* 107*           No results found for this or any previous visit. No results found for this or any previous visit. No results found for this or any previous visit. No results found for this or any previous visit. Results for orders placed during the hospital encounter of 01/11/22    MRI BRAIN WO CONTRAST    Narrative  EXAMINATION:  MRI OF THE BRAIN WITHOUT CONTRAST  1/12/2022 3:40 pm    TECHNIQUE:  Multiplanar multisequence MRI of the brain was performed without the  administration of intravenous contrast.    COMPARISON:  None. HISTORY:  ORDERING SYSTEM PROVIDED HISTORY: altered mental status, blurry vision  TECHNOLOGIST PROVIDED HISTORY:  altered mental status, blurry vision  Reason for Exam: Pt to ER yesterday for AMS, hallucinations, and blurry  vision. Possible hx of seizures. FINDINGS:  The examination is motion degraded. INTRACRANIAL STRUCTURES/VENTRICLES: There is no acute infarct. No mass effect  or midline shift. No evidence of an acute intracranial hemorrhage. The  ventricles and sulci are normal in size and configuration. The  sellar/suprasellar regions appear unremarkable. The normal signal voids  within the major intracranial vessels appear maintained. ORBITS: The visualized portion of the orbits demonstrate no acute abnormality. SINUSES: The visualized paranasal sinuses and mastoid air cells demonstrate  no acute abnormality. BONES/SOFT TISSUES: The bone marrow signal intensity appears normal. The soft  tissues demonstrate no acute abnormality. Impression  No acute abnormality. No results found for this or any previous visit. No results found for this or any previous visit.     No results found for this or any previous visit. Assessment:  Principal Problem:    AMS (altered mental status)  Active Problems:    Vitamin D deficiency    Anxiety and depression    Morbid obesity with BMI of 40.0-44.9, adult (HCC)    Seizure ([address]    Hypokalemia  Resolved Problems:    * No resolved hospital problems. *    39 year old man with new onset seizure 2 years ago, presented with his 2rd and remained post ictal for 3-4 days and had another seizure today    Plan:    1. On further history, wife and pt stated that he was told by his mom that he had seizure when he was very very young, he could not remember  2. lamictal was started, but since pt has another seizure today, lamictal will take time to ramp up, will d/c it and change to depakote 500mg BID, will load with 1g  3. keppra is avoided because pt stated that he can occasionally get angry, family confirmed it  4. Ativan 1mg PRN for breakthrough sz  5. Pt admitted to being depressed as well, no suicide ideation, recommend psy consult, can also be done outpatient  6.  Will follow    José Miguel Zhou MD 1/13/2022 6:12 PM       Katelyn Massey MD  Attending Neurologist/Neurointensivist
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
## 2022-01-13 NOTE — PROGRESS NOTES
Chair alarm sounded, PCT went to room and noted pt standing by RT side of bed and then fell face first into bed. Writer entered room after PCT and witnessed pt lying face first diagonally in bed. Pt was unresponsive, drooling, stiff/flaccid, diaphoretic, and noted to be shaking & twitching all over his body. This episode was noted to last two minutes by writer and PCT. Writer, PCT, and one other nurse turned pt onto LT side, pt then began crying and yelling incomprehensibly. Rapid response team was called. VS o2 96%, BP RYAN 126/104, , HR 89. BP was retaken in BECKI 128/70, MAP 84, HR 77. Dr. Kennedy Douglas and Dr. Malik Medina paged and were at bedside within five minutes. New orders received. Will continue to monitor. Writer calling pt's alla [facility] to update on pt condition.    Electronically signed by Makenna Zepeda RN on 1/13/2022 at 5:38 PM

## 2022-01-14 LAB
VALPROIC ACID LEVEL: 72 UG/ML (ref 50–125)
VALPROIC DATE LAST DOSE: NORMAL
VALPROIC DOSE AMOUNT: NORMAL
VALPROIC TIME LAST DOSE: NORMAL

## 2022-01-14 PROCEDURE — 6370000000 HC RX 637 (ALT 250 FOR IP): Performed by: PSYCHIATRY & NEUROLOGY

## 2022-01-14 PROCEDURE — 6360000002 HC RX W HCPCS: Performed by: FAMILY MEDICINE

## 2022-01-14 PROCEDURE — G0378 HOSPITAL OBSERVATION PER HR: HCPCS

## 2022-01-14 PROCEDURE — 96367 TX/PROPH/DG ADDL SEQ IV INF: CPT

## 2022-01-14 PROCEDURE — 96376 TX/PRO/DX INJ SAME DRUG ADON: CPT

## 2022-01-14 PROCEDURE — 2580000003 HC RX 258: Performed by: PSYCHIATRY & NEUROLOGY

## 2022-01-14 PROCEDURE — 2580000003 HC RX 258: Performed by: NURSE PRACTITIONER

## 2022-01-14 PROCEDURE — 80164 ASSAY DIPROPYLACETIC ACD TOT: CPT

## 2022-01-14 PROCEDURE — 96372 THER/PROPH/DIAG INJ SC/IM: CPT

## 2022-01-14 PROCEDURE — 6360000002 HC RX W HCPCS: Performed by: NURSE PRACTITIONER

## 2022-01-14 PROCEDURE — 99232 SBSQ HOSP IP/OBS MODERATE 35: CPT | Performed by: FAMILY MEDICINE

## 2022-01-14 PROCEDURE — C9254 INJECTION, LACOSAMIDE: HCPCS | Performed by: PSYCHIATRY & NEUROLOGY

## 2022-01-14 PROCEDURE — 96366 THER/PROPH/DIAG IV INF ADDON: CPT

## 2022-01-14 PROCEDURE — 99233 SBSQ HOSP IP/OBS HIGH 50: CPT | Performed by: PSYCHIATRY & NEUROLOGY

## 2022-01-14 PROCEDURE — 6360000002 HC RX W HCPCS: Performed by: PSYCHIATRY & NEUROLOGY

## 2022-01-14 PROCEDURE — 95813 EEG EXTND MNTR 61-119 MIN: CPT

## 2022-01-14 PROCEDURE — 2500000003 HC RX 250 WO HCPCS: Performed by: PSYCHIATRY & NEUROLOGY

## 2022-01-14 PROCEDURE — 36415 COLL VENOUS BLD VENIPUNCTURE: CPT

## 2022-01-14 RX ORDER — LORAZEPAM 2 MG/ML
1 INJECTION INTRAMUSCULAR ONCE
Status: COMPLETED | OUTPATIENT
Start: 2022-01-14 | End: 2022-01-14

## 2022-01-14 RX ADMIN — SODIUM CHLORIDE 25 ML: 9 INJECTION, SOLUTION INTRAVENOUS at 16:24

## 2022-01-14 RX ADMIN — DEXTROSE MONOHYDRATE 100 MG: 50 INJECTION, SOLUTION INTRAVENOUS at 21:16

## 2022-01-14 RX ADMIN — LORAZEPAM 1 MG: 2 INJECTION INTRAMUSCULAR; INTRAVENOUS at 15:23

## 2022-01-14 RX ADMIN — DEXTROSE MONOHYDRATE 200 MG: 50 INJECTION, SOLUTION INTRAVENOUS at 17:51

## 2022-01-14 RX ADMIN — VALPROATE SODIUM 500 MG: 100 INJECTION, SOLUTION INTRAVENOUS at 23:09

## 2022-01-14 RX ADMIN — SODIUM CHLORIDE, PRESERVATIVE FREE 10 ML: 5 INJECTION INTRAVENOUS at 10:34

## 2022-01-14 RX ADMIN — SODIUM CHLORIDE, PRESERVATIVE FREE 10 ML: 5 INJECTION INTRAVENOUS at 15:23

## 2022-01-14 RX ADMIN — LORAZEPAM 1 MG: 2 INJECTION INTRAMUSCULAR; INTRAVENOUS at 10:49

## 2022-01-14 RX ADMIN — ENOXAPARIN SODIUM 30 MG: 100 INJECTION SUBCUTANEOUS at 21:13

## 2022-01-14 RX ADMIN — SODIUM CHLORIDE, PRESERVATIVE FREE 10 ML: 5 INJECTION INTRAVENOUS at 10:50

## 2022-01-14 RX ADMIN — SODIUM CHLORIDE, PRESERVATIVE FREE 10 ML: 5 INJECTION INTRAVENOUS at 14:05

## 2022-01-14 RX ADMIN — DIVALPROEX SODIUM 500 MG: 500 TABLET, EXTENDED RELEASE ORAL at 10:34

## 2022-01-14 RX ADMIN — ENOXAPARIN SODIUM 30 MG: 100 INJECTION SUBCUTANEOUS at 10:34

## 2022-01-14 RX ADMIN — SODIUM CHLORIDE, PRESERVATIVE FREE 10 ML: 5 INJECTION INTRAVENOUS at 21:14

## 2022-01-14 RX ADMIN — DEXTROSE MONOHYDRATE 1000 MG: 50 INJECTION, SOLUTION INTRAVENOUS at 16:28

## 2022-01-14 ASSESSMENT — ENCOUNTER SYMPTOMS
CHEST TIGHTNESS: 0
ABDOMINAL PAIN: 0
CONSTIPATION: 0
DIARRHEA: 0
SHORTNESS OF BREATH: 0
BLOOD IN STOOL: 0
COUGH: 0
VOMITING: 0
NAUSEA: 0
WHEEZING: 0

## 2022-01-14 ASSESSMENT — PAIN SCALES - GENERAL: PAINLEVEL_OUTOF10: 0

## 2022-01-14 NOTE — PROGRESS NOTES
Called to the room; patient resting in bed with facial twitching and bilateral hand tremors that resolved in a little more than 1 minute.

## 2022-01-14 NOTE — PROGRESS NOTES
NEUROLOGY INPATIENT PROGRESS NOTE    Patient- Feliciana Hashimoto    MRN -  [id number]   Acct # - [de-identified]      - 1980    37 y.o. Subjective: The patient is seen as follow up for seizures. Patient is lethargic at this time. Pt reported to have 3 seizures today, last was 3:30pm, all lasting 1.5-2mins and followed by being post ictal, pt got ativan 1mg x 2 does and he has been sleeping    Tolerate depakote, level is 72. Objective:   /75   Pulse 95   Temp 99.1 °F (37.3 °C) (Oral)   Resp 17   Ht 5' 6\" (1.676 m)   Wt 249 lb 3.2 oz (113 kg)   SpO2 91%   BMI 40.22 kg/m²       Intake/Output Summary (Last 24 hours) at 2022 1741  Last data filed at 2022 0719  Gross per 24 hour   Intake 100.72 ml   Output 300 ml   Net -199.28 ml       Physical Exam:  General:  sleeping, lying on bed,  not in distress. Language is intact. HEENT: pink conjunctiva, unicteric sclera, moist oral mucosa. There is no neck lymphadenopathy. Neck: There is no carotid bruits. The Neck is supple. Neuro: CN 2-12 grossly intact with no focal deficits. Power 5/5 Throughout symmetric,  Long tracts are intact. Cerebellar exam is Intact. Sensory exam is intact to light touch. Gait is normal. No abnormal movement. Osteo: There is no LROM of any of the 4 extremity joints, no joint tenderness. Leg edema none  Skin: no lesions, no rash, warm and moist to touch. Abdomen is soft intact bowel sounds. ROS:    Cardiac: no chest pain. No palpitations.   Renal : no flank pain, no hematuria  Skin: no rash    Medications:     lacosamide (VIMPAT) IVPB  200 mg IntraVENous Once    divalproex  750 mg Oral BID    lacosamide (VIMPAT) IVPB  100 mg IntraVENous BID    sodium chloride flush  5-40 mL IntraVENous 2 times per day    enoxaparin  30 mg SubCUTAneous BID    vitamin D  50,000 Units Oral Weekly       Data:   CBC:   Recent Labs     22  2100 22  0628   WBC 9.4 9.0 HGB 13.6 13.2    307     BMP:    Recent Labs     01/11/22  2100 01/13/22  0628    140   K 3.6* 4.0    104   CO2 25 23   BUN 9 9   CREATININE 0.74 0.71   GLUCOSE 110* 107*           No results found for this or any previous visit. No results found for this or any previous visit. No results found for this or any previous visit. No results found for this or any previous visit. Results for orders placed during the hospital encounter of 01/11/22    MRI BRAIN WO CONTRAST    Narrative  EXAMINATION:  MRI OF THE BRAIN WITHOUT CONTRAST  1/12/2022 3:40 pm    TECHNIQUE:  Multiplanar multisequence MRI of the brain was performed without the  administration of intravenous contrast.    COMPARISON:  None. HISTORY:  ORDERING SYSTEM PROVIDED HISTORY: altered mental status, blurry vision  TECHNOLOGIST PROVIDED HISTORY:  altered mental status, blurry vision  Reason for Exam: Pt to ER yesterday for AMS, hallucinations, and blurry  vision. Possible hx of seizures. FINDINGS:  The examination is motion degraded. INTRACRANIAL STRUCTURES/VENTRICLES: There is no acute infarct. No mass effect  or midline shift. No evidence of an acute intracranial hemorrhage. The  ventricles and sulci are normal in size and configuration. The  sellar/suprasellar regions appear unremarkable. The normal signal voids  within the major intracranial vessels appear maintained. ORBITS: The visualized portion of the orbits demonstrate no acute abnormality. SINUSES: The visualized paranasal sinuses and mastoid air cells demonstrate  no acute abnormality. BONES/SOFT TISSUES: The bone marrow signal intensity appears normal. The soft  tissues demonstrate no acute abnormality. Impression  No acute abnormality. No results found for this or any previous visit. No results found for this or any previous visit. No results found for this or any previous visit.         Assessment:  Principal Problem:    AMS (altered mental status)  Active Problems:    Vitamin D deficiency    Anxiety and depression    Morbid obesity with BMI of 40.0-44.9, adult ([address]    New onset seizure ([facility]    Hypokalemia  Resolved Problems:    * No resolved hospital problems. *    39 year old man with new onset seizure 2 years ago, presented with his 2rd and remained post ictal for 3-4 days and had another 3 seizures today    Plan:    1. Will load with another depakote of 1g IV and increase depakote to 750mg BID  2. Load with vimpat 200mg IV follow by 100mg BID    3. keppra is avoided because pt stated that he can occasionally get angry, family confirmed it  4. Ativan 1mg PRN for breakthrough sz  5. Repeat EEG and run it for 2 hours   6. Will follow closely, pt can go to progressive unit here  7.  Please call me immediately if there is a seizure    Oral Clayton MD 1/14/2022 5:41 PM       Barbara Griggs MD  Attending Neurologist/Neurointensivist
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
## 2022-01-14 NOTE — PROGRESS NOTES
Dr Ricardo Briggs to the patient room; patient with eye closed, awakens to voice. Not at baseline. To give Ativan IV now as ordered.

## 2022-01-14 NOTE — PROGRESS NOTES
Physical Therapy  DATE: 2022    NAME: Priyanka Peralta  MRN: [id number]   : 1980    Patient not seen this date for Physical Therapy due to:      [] Cancel by RN or physician due to:    [] Hemodialysis    [] Critical Lab Value Level     [] Blood transfusion in progress    [] Acute or unstable cardiovascular status   _MAP < 55 or more than >115  _HR < 40 or > 130    [] Acute or unstable pulmonary status   -FiO2 > 60%   _RR < 5 or >40    _O2 sats < 85%    [] Strict Bedrest    [] Off Unit for surgery or procedure    [] Off Unit for testing       [] Pending imaging to R/O fracture    [] Refusal by Patient      [x] Other (Pt with recent seizure activity, not appropriate for therapy this date. Will continue to follow)   [] PT being discontinued at this time. Patient independent. No further needs. [] PT being discontinued at this time as the patient has been transferred to hospice care. No further needs.       Alfaro Kelly, PTA

## 2022-01-14 NOTE — PROGRESS NOTES
PCT called out and said pt is seizing. Upon writer immediately going to room pt was no longer seizing but eyes closed. PCT said it lasted less then a minute. Dr Rashaun Sánchez present and informed. Pt nurse informed.

## 2022-01-14 NOTE — PLAN OF CARE
Problem: Falls - Risk of:  Goal: Will remain free from falls  Description: Will remain free from falls  Outcome: Ongoing     Problem: Falls - Risk of:  Goal: Absence of physical injury  Description: Absence of physical injury  Outcome: Ongoing     Problem: Safety:  Goal: Free from accidental physical injury  Description: Free from accidental physical injury  Outcome: Ongoing     Problem: Safety:  Goal: Free from intentional harm  Description: Free from intentional harm  Outcome: Ongoing     Problem: Safety:  Goal: Ability to remain free from injury will improve  Description: Ability to remain free from injury will improve  Outcome: Ongoing     Problem: Pain:  Goal: Pain level will decrease  Description: Pain level will decrease  Outcome: Ongoing     Problem: Pain:  Goal: Control of acute pain  Description: Control of acute pain  Outcome: Ongoing

## 2022-01-14 NOTE — PROGRESS NOTES
Occupational Therapy    DATE: 2022    NAME: Nathanael Paul  MRN: [id number]   : 1980    Patient not seen this date for Occupational Therapy due to:      [] Cancel by RN or physician due to:    [] Hemodialysis    [] Critical Lab Value Level     [] Blood transfusion in progress    [] Acute or unstable cardiovascular status   _MAP < 55 or more than >115  _HR < 40 or > 130    [] Acute or unstable pulmonary status   -FiO2 > 60%   _RR < 5 or >40    _O2 sats < 85%    [] Strict Bedrest    [] Off Unit for surgery or procedure    [] Off Unit for testing       [] Pending imaging to R/O fracture    [] Refusal by Patient      [] Other: Pt with recent seizure activity, not appropriate for therapy this date.  Will continue to follow    Kaycee Pimentel OTR/L

## 2022-01-14 NOTE — PROCEDURES
EEG REPORT    Patient Name: Gregory Carter  Patient MRN: [id number]    Referring Physician:   Dictating Physician: Jose Angel Nielsen MD  Date: 1/12/22      CLINICAL HISTORY: This EEG was performed on a 37 y.o. male with The encounter diagnosis was Altered mental status, unspecified altered mental status type. . It is being performed to evaluate for seizure activity. CURRENT ANTI-EPILEPTIC MEDICATIONS:     DESCRIPTION: Wakefulness, drowsiness, and Stage II sleep were obtained. During wakefulness there was a posterior background of regular, reactive, symmetrical, moderate voltage with an anterior background of low voltage mixed frequencies. Rare,  left sharp wave were seen. Photic stimulation evoked a symmetrical posterior driving response at some flash frequencies. 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 The focal slowing is a sign of focal cortical neuronal dysfunction in the involved region. The background slowing is consistent with a mild encephalopathy. No electrographic seizures were recorded.      Please correlate with patient clinical exam.        Jose Angel Nielsen MD  [facility]  Neurology , Neurocritical Care

## 2022-01-15 PROBLEM — G40.909 SEIZURE DISORDER (HCC): Status: ACTIVE | Noted: 2022-01-15

## 2022-01-15 LAB
VALPROIC ACID LEVEL: 108 UG/ML (ref 50–125)
VALPROIC DATE LAST DOSE: NORMAL
VALPROIC DOSE AMOUNT: NORMAL
VALPROIC TIME LAST DOSE: NORMAL

## 2022-01-15 PROCEDURE — 99233 SBSQ HOSP IP/OBS HIGH 50: CPT | Performed by: PSYCHIATRY & NEUROLOGY

## 2022-01-15 PROCEDURE — 2580000003 HC RX 258: Performed by: PSYCHIATRY & NEUROLOGY

## 2022-01-15 PROCEDURE — 96372 THER/PROPH/DIAG INJ SC/IM: CPT

## 2022-01-15 PROCEDURE — 36415 COLL VENOUS BLD VENIPUNCTURE: CPT

## 2022-01-15 PROCEDURE — 6370000000 HC RX 637 (ALT 250 FOR IP): Performed by: PSYCHIATRY & NEUROLOGY

## 2022-01-15 PROCEDURE — 6360000002 HC RX W HCPCS: Performed by: NURSE PRACTITIONER

## 2022-01-15 PROCEDURE — C9254 INJECTION, LACOSAMIDE: HCPCS | Performed by: PSYCHIATRY & NEUROLOGY

## 2022-01-15 PROCEDURE — 6360000002 HC RX W HCPCS: Performed by: PSYCHIATRY & NEUROLOGY

## 2022-01-15 PROCEDURE — 2060000000 HC ICU INTERMEDIATE R&B

## 2022-01-15 PROCEDURE — 2580000003 HC RX 258: Performed by: NURSE PRACTITIONER

## 2022-01-15 PROCEDURE — 80164 ASSAY DIPROPYLACETIC ACD TOT: CPT

## 2022-01-15 PROCEDURE — 99232 SBSQ HOSP IP/OBS MODERATE 35: CPT | Performed by: FAMILY MEDICINE

## 2022-01-15 RX ADMIN — SODIUM CHLORIDE, PRESERVATIVE FREE 10 ML: 5 INJECTION INTRAVENOUS at 08:01

## 2022-01-15 RX ADMIN — DEXTROSE MONOHYDRATE 100 MG: 50 INJECTION, SOLUTION INTRAVENOUS at 08:15

## 2022-01-15 RX ADMIN — ENOXAPARIN SODIUM 30 MG: 100 INJECTION SUBCUTANEOUS at 21:10

## 2022-01-15 RX ADMIN — DEXTROSE MONOHYDRATE 100 MG: 50 INJECTION, SOLUTION INTRAVENOUS at 21:14

## 2022-01-15 RX ADMIN — DIVALPROEX SODIUM 750 MG: 500 TABLET, EXTENDED RELEASE ORAL at 21:11

## 2022-01-15 RX ADMIN — DIVALPROEX SODIUM 750 MG: 500 TABLET, EXTENDED RELEASE ORAL at 12:04

## 2022-01-15 RX ADMIN — ENOXAPARIN SODIUM 30 MG: 100 INJECTION SUBCUTANEOUS at 08:01

## 2022-01-15 RX ADMIN — SODIUM CHLORIDE, PRESERVATIVE FREE 10 ML: 5 INJECTION INTRAVENOUS at 19:37

## 2022-01-15 ASSESSMENT — ENCOUNTER SYMPTOMS
SHORTNESS OF BREATH: 0
WHEEZING: 0
BLOOD IN STOOL: 0
NAUSEA: 0
COUGH: 0
ABDOMINAL PAIN: 0
DIARRHEA: 0
VOMITING: 0
CHEST TIGHTNESS: 0
CONSTIPATION: 0

## 2022-01-15 ASSESSMENT — PAIN SCALES - GENERAL
PAINLEVEL_OUTOF10: 0

## 2022-01-15 NOTE — PROGRESS NOTES
NEUROLOGY INPATIENT PROGRESS NOTE    Patient- Doris Saavedra    MRN -  [id number]   Acct # - [de-identified]      - 1980    37 y.o. Subjective: The patient is seen as follow up for seizures. Patient is lethargic at this time. Had 3 seizure yesterday day time, added vimpat 200mg bolus + 100mg BID and VPA bolus 1g and increase depakote to 750mg BID    Since then no reported seizure, but it was reported pt urinated on his bed overnight for the second night    Today, pt is completely back to normal, confirmed by his family       Objective:   BP (!) 130/94   Pulse 101   Temp 97.4 °F (36.3 °C) (Temporal)   Resp 17   Ht 5' 6\" (1.676 m)   Wt 249 lb 3.2 oz (113 kg)   SpO2 97%   BMI 40.22 kg/m²       Intake/Output Summary (Last 24 hours) at 1/15/2022 1124  Last data filed at 1/15/2022 1026  Gross per 24 hour   Intake 749.46 ml   Output 350 ml   Net 399.46 ml       Physical Exam:  General:  awake, sitting on bed,  not in distress. Language is intact. AAO X4  HEENT: pink conjunctiva, unicteric sclera, moist oral mucosa. There is no neck lymphadenopathy. Neck: There is no carotid bruits. The Neck is supple. Neuro: CN 2-12 grossly intact with no focal deficits. Power 5/5 Throughout symmetric,  Long tracts are intact. Cerebellar exam is Intact. Sensory exam is intact to light touch. Gait is normal. No abnormal movement. Osteo: There is no LROM of any of the 4 extremity joints, no joint tenderness. Leg edema none  Skin: no lesions, no rash, warm and moist to touch. Abdomen is soft intact bowel sounds. ROS:    Cardiac: no chest pain. No palpitations.   Renal : no flank pain, no hematuria  Skin: no rash    Medications:     [Held by provider] divalproex  750 mg Oral BID    lacosamide (VIMPAT) IVPB  100 mg IntraVENous BID    sodium chloride flush  5-40 mL IntraVENous 2 times per day    enoxaparin  30 mg SubCUTAneous BID    vitamin D  50,000 Units Oral Weekly Data:   CBC:   Recent Labs     01/13/22  0628   WBC 9.0   HGB 13.2        BMP:    Recent Labs     01/13/22  0628      K 4.0      CO2 23   BUN 9   CREATININE 0.71   GLUCOSE 107*           No results found for this or any previous visit. No results found for this or any previous visit. No results found for this or any previous visit. No results found for this or any previous visit. Results for orders placed during the hospital encounter of 01/11/22    MRI BRAIN WO CONTRAST    Narrative  EXAMINATION:  MRI OF THE BRAIN WITHOUT CONTRAST  1/12/2022 3:40 pm    TECHNIQUE:  Multiplanar multisequence MRI of the brain was performed without the  administration of intravenous contrast.    COMPARISON:  None. HISTORY:  ORDERING SYSTEM PROVIDED HISTORY: altered mental status, blurry vision  TECHNOLOGIST PROVIDED HISTORY:  altered mental status, blurry vision  Reason for Exam: Pt to ER yesterday for AMS, hallucinations, and blurry  vision. Possible hx of seizures. FINDINGS:  The examination is motion degraded. INTRACRANIAL STRUCTURES/VENTRICLES: There is no acute infarct. No mass effect  or midline shift. No evidence of an acute intracranial hemorrhage. The  ventricles and sulci are normal in size and configuration. The  sellar/suprasellar regions appear unremarkable. The normal signal voids  within the major intracranial vessels appear maintained. ORBITS: The visualized portion of the orbits demonstrate no acute abnormality. SINUSES: The visualized paranasal sinuses and mastoid air cells demonstrate  no acute abnormality. BONES/SOFT TISSUES: The bone marrow signal intensity appears normal. The soft  tissues demonstrate no acute abnormality. Impression  No acute abnormality. No results found for this or any previous visit. No results found for this or any previous visit. No results found for this or any previous visit. Assessment:  Principal Problem:     AMS (altered mental status)  Active Problems:    Vitamin D deficiency    Anxiety and depression    Morbid obesity with BMI of 40.0-44.9, adult ([facility] [address]    New onset seizure ([facility] [address]    Hypokalemia    Seizure disorder ([facility] [address]  Resolved Problems:    * No resolved hospital problems. *    39 year old man with new onset seizure 2 years ago, presented with his 2rd and remained post ictal for 3-4 days and had another 3 seizures yesterday     Plan:    1. con't depakote at 750mg BID, checking level today  2. con't vimpat 100mg BID   3. keppra is avoided because pt stated that he can occasionally get angry, family confirmed it, however, I told pt today, if his seizure remains difficult to treat, adding keppra will be taken into consideration  4. Ativan 1mg PRN for breakthrough sz  5. Repeat EEG actually reaffirmed the sharp wave I saw on the first EEG, this sharp turn into what is more concerning for seizure activity  6. Will follow closely  7.  Please call me immediately if there is a seizure    Camryn Montes MD 1/15/2022 11:24 AM       Marco A Washington MD  Attending Neurologist/Neurointensivist

## 2022-01-15 NOTE — PROGRESS NOTES
Pt received IV Vimpat and IV Depacon as ordered. Continues to sleep; no distress noted. Pt will mumble frequently while sleeping; wake up suddenly and sit up; easily oriented to place and situation. Pt will answer questions appropriately then fall back asleep. Incontinent of urine. Pt cleaned up; brief, gown and linens changed.

## 2022-01-15 NOTE — PROGRESS NOTES
Dr Malik Medina, neurology making rounds. Pt still sleeping; responds to verbal stimuli. Pt able to state his name and responds to commands for Dr Malik Medina. New verbal orders (with readback) received from Dr Malik Medina. Hold (po) dose of Depakote and order one time dose of IVPB Depacon 500 mg. Dr Malik Medina requests to keep lights dimmed and to call him if any seizure activity.

## 2022-01-15 NOTE — PROGRESS NOTES
Pt has arrived to [facility] [address] 2035. Pt transferred by his bed. Prior to transfer pt incontinent of large mushy stool and urine. Pt cleaned up and brief changed. Pt sleeping; opens eyes to verbal stimuli then goes back to sleep. Pt is one to one observation. No distress noted. Continue to closely monitor.
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
## 2022-01-15 NOTE — PROGRESS NOTES
Spoke to [employer], patient's sharlene. Updated on pt's status. [employer] is aware that pt will be transferred to room 2035, CVTU this evening. Phone number to [address] [facility] station given. [employer] states she will visit pt tomorrow after work.

## 2022-01-15 NOTE — PLAN OF CARE
Problem: Falls - Risk of:  Goal: Will remain free from falls  Description: Will remain free from falls  1/15/2022 1716 by David Galan RN  Outcome: Ongoing  1/15/2022 0406 by Lucina Torres RN  Outcome: Met This Shift  Goal: Absence of physical injury  Description: Absence of physical injury  1/15/2022 1716 by David Galan RN  Outcome: Ongoing  1/15/2022 0406 by Lucina Torres RN  Outcome: Met This Shift     Problem: Safety:  Goal: Free from accidental physical injury  Description: Free from accidental physical injury  1/15/2022 1716 by David Galan RN  Outcome: Ongoing  1/15/2022 0406 by Lucina Torres RN  Outcome: Met This Shift  Goal: Free from intentional harm  Description: Free from intentional harm  Outcome: Ongoing  Goal: Ability to remain free from injury will improve  Description: Ability to remain free from injury will improve  Outcome: Ongoing     Problem: Pain:  Goal: Pain level will decrease  Description: Pain level will decrease  1/15/2022 1716 by David Galan RN  Outcome: Ongoing  1/15/2022 0406 by Lucina Torres RN  Outcome: Ongoing  Goal: Control of acute pain  Description: Control of acute pain  Outcome: Ongoing     Problem: Health Behavior:  Goal: Ability to manage health-related needs will improve  Description: Ability to manage health-related needs will improve  1/15/2022 1716 by David Galan RN  Outcome: Ongoing  1/15/2022 0406 by Lucina Torres RN  Outcome: Ongoing     Problem: Physical Regulation:  Goal: Signs of adequate cerebral perfusion will increase  Description: Signs of adequate cerebral perfusion will increase  1/15/2022 1716 by David Galan RN  Outcome: Ongoing  1/15/2022 0406 by Lucina Torres RN  Outcome: Ongoing  Goal: Ability to maintain a stable neurologic state will improve  Description: Ability to maintain a stable neurologic state will improve  1/15/2022 1716 by David Galan RN  Outcome: Ongoing  1/15/2022 0406 by Lucina Torres RN  Outcome: Ongoing     Problem: Self-Concept:  Goal: Level of anxiety will decrease  Description: Level of anxiety will decrease  Outcome: Ongoing  Goal: Ability to verbalize feelings about condition will improve  Description: Ability to verbalize feelings about condition will improve  Outcome: Ongoing     Problem: Skin Integrity:  Goal: Will show no infection signs and symptoms  Description: Will show no infection signs and symptoms  Outcome: Ongoing  Goal: Absence of new skin breakdown  Description: Absence of new skin breakdown  Outcome: Ongoing

## 2022-01-16 LAB
VALPROIC ACID LEVEL: 121 UG/ML (ref 50–125)
VALPROIC DATE LAST DOSE: NORMAL
VALPROIC DOSE AMOUNT: NORMAL
VALPROIC TIME LAST DOSE: NORMAL

## 2022-01-16 PROCEDURE — 99233 SBSQ HOSP IP/OBS HIGH 50: CPT | Performed by: PSYCHIATRY & NEUROLOGY

## 2022-01-16 PROCEDURE — 99232 SBSQ HOSP IP/OBS MODERATE 35: CPT | Performed by: INTERNAL MEDICINE

## 2022-01-16 PROCEDURE — 6360000002 HC RX W HCPCS: Performed by: NURSE PRACTITIONER

## 2022-01-16 PROCEDURE — 80164 ASSAY DIPROPYLACETIC ACD TOT: CPT

## 2022-01-16 PROCEDURE — 6370000000 HC RX 637 (ALT 250 FOR IP): Performed by: PSYCHIATRY & NEUROLOGY

## 2022-01-16 PROCEDURE — 2580000003 HC RX 258: Performed by: PSYCHIATRY & NEUROLOGY

## 2022-01-16 PROCEDURE — 6360000002 HC RX W HCPCS: Performed by: PSYCHIATRY & NEUROLOGY

## 2022-01-16 PROCEDURE — C9254 INJECTION, LACOSAMIDE: HCPCS | Performed by: PSYCHIATRY & NEUROLOGY

## 2022-01-16 PROCEDURE — 2580000003 HC RX 258: Performed by: NURSE PRACTITIONER

## 2022-01-16 PROCEDURE — 36415 COLL VENOUS BLD VENIPUNCTURE: CPT

## 2022-01-16 PROCEDURE — 96372 THER/PROPH/DIAG INJ SC/IM: CPT

## 2022-01-16 PROCEDURE — 1200000000 HC SEMI PRIVATE

## 2022-01-16 RX ORDER — LACOSAMIDE 100 MG/1
150 TABLET ORAL 2 TIMES DAILY
Status: DISCONTINUED | OUTPATIENT
Start: 2022-01-16 | End: 2022-01-17

## 2022-01-16 RX ADMIN — LACOSAMIDE 150 MG: 100 TABLET, FILM COATED ORAL at 20:49

## 2022-01-16 RX ADMIN — DIVALPROEX SODIUM 750 MG: 500 TABLET, EXTENDED RELEASE ORAL at 07:41

## 2022-01-16 RX ADMIN — ENOXAPARIN SODIUM 30 MG: 100 INJECTION SUBCUTANEOUS at 20:49

## 2022-01-16 RX ADMIN — SODIUM CHLORIDE, PRESERVATIVE FREE 10 ML: 5 INJECTION INTRAVENOUS at 20:49

## 2022-01-16 RX ADMIN — LACOSAMIDE 150 MG: 100 TABLET, FILM COATED ORAL at 15:38

## 2022-01-16 RX ADMIN — ENOXAPARIN SODIUM 30 MG: 100 INJECTION SUBCUTANEOUS at 07:41

## 2022-01-16 RX ADMIN — DEXTROSE MONOHYDRATE 100 MG: 50 INJECTION, SOLUTION INTRAVENOUS at 08:58

## 2022-01-16 ASSESSMENT — PAIN SCALES - GENERAL
PAINLEVEL_OUTOF10: 0

## 2022-01-16 NOTE — PROGRESS NOTES
Occupational Therapy  DATE: 2022    NAME: Lyndsey Marshall  MRN: [id number]   : 1980    Patient not seen this date for Occupational Therapy due to:      [] Cancel by RN or physician due to:    [] Hemodialysis    [] Critical Lab Value Level     [] Blood transfusion in progress    [] Acute or unstable cardiovascular status   _MAP < 55 or more than >115  _HR < 40 or > 130    [] Acute or unstable pulmonary status   -FiO2 > 60%   _RR < 5 or >40    _O2 sats < 85%    [] Strict Bedrest    [] Off Unit for surgery or procedure    [] Off Unit for testing       [] Pending imaging to R/O fracture    [x] Refusal by Patient : Pt. Declined treatment at this time stating \"I feel shakey\". Nursing notified and OT will check back later if time permits. [] Other      []OT being discontinued at this time. Patient independent. No further needs. [] OT being discontinued at this time as the patient has been transferred to hospice care. No further needs.       ALBERTO Rivera

## 2022-01-16 NOTE — FLOWSHEET NOTE
Patient and all of patient's belongings transferred from room 2035 to room 2026. Patient's family updated. Bedside report completed.

## 2022-01-16 NOTE — PROGRESS NOTES
NEUROLOGY INPATIENT PROGRESS NOTE    Patient- Fantasma Singh    MRN -  [id number]   [facility]      - 1980    37 y.o. Subjective: The patient is seen as follow up for seizures. Patient is lethargic at this time. No seizure or urinary incontinence overnight, pt feels that he is completely back to normal, he feels very good, but complaint of a bit palpitation    Overnight, nurse noted that there were at least two incident his HR was in the 140, at the time I saw him today, his HR is consistently in the 110-130 range, but never had hx of high heart rate, his heart rate was normal when he came in    Today, pt is completely back to normal, confirmed by his family       Objective:   BP (!) 133/94   Pulse 90   Temp 98.1 °F (36.7 °C) (Temporal)   Resp 19   Ht 5' 6\" (1.676 m)   Wt 249 lb 3.2 oz (113 kg)   SpO2 100%   BMI 40.22 kg/m²       Intake/Output Summary (Last 24 hours) at 2022 1222  Last data filed at 2022 0919  Gross per 24 hour   Intake 855 ml   Output 200 ml   Net 655 ml       Physical Exam:  General:  awake, sitting on bed,  not in distress. Language is intact. AAO X4  HEENT: pink conjunctiva, unicteric sclera, moist oral mucosa. There is no neck lymphadenopathy. Neck: There is no carotid bruits. The Neck is supple. Neuro: CN 2-12 grossly intact with no focal deficits. Power 5/5 Throughout symmetric,  Long tracts are intact. Cerebellar exam is Intact. Sensory exam is intact to light touch. Gait is normal. No abnormal movement. Osteo: There is no LROM of any of the 4 extremity joints, no joint tenderness. Leg edema none  Skin: no lesions, no rash, warm and moist to touch. Abdomen is soft intact bowel sounds. ROS:    Cardiac: no chest pain. No palpitations.   Renal : no flank pain, no hematuria  Skin: no rash    Medications:     divalproex  750 mg Oral BID    lacosamide (VIMPAT) IVPB  100 mg IntraVENous BID    sodium chloride flush  5-40 mL IntraVENous 2 times per day    enoxaparin  30 mg SubCUTAneous BID    vitamin D  50,000 Units Oral Weekly       Data:   CBC:   No results for input(s): WBC, HGB, PLT in the last 72 hours. BMP:    No results for input(s): NA, K, CL, CO2, BUN, CREATININE, GLUCOSE in the last 72 hours. No results found for this or any previous visit. No results found for this or any previous visit. No results found for this or any previous visit. No results found for this or any previous visit. Results for orders placed during the hospital encounter of 01/11/22    MRI BRAIN WO CONTRAST    Narrative  EXAMINATION:  MRI OF THE BRAIN WITHOUT CONTRAST  1/12/2022 3:40 pm    TECHNIQUE:  Multiplanar multisequence MRI of the brain was performed without the  administration of intravenous contrast.    COMPARISON:  None. HISTORY:  ORDERING SYSTEM PROVIDED HISTORY: altered mental status, blurry vision  TECHNOLOGIST PROVIDED HISTORY:  altered mental status, blurry vision  Reason for Exam: Pt to ER yesterday for AMS, hallucinations, and blurry  vision. Possible hx of seizures. FINDINGS:  The examination is motion degraded. INTRACRANIAL STRUCTURES/VENTRICLES: There is no acute infarct. No mass effect  or midline shift. No evidence of an acute intracranial hemorrhage. The  ventricles and sulci are normal in size and configuration. The  sellar/suprasellar regions appear unremarkable. The normal signal voids  within the major intracranial vessels appear maintained. ORBITS: The visualized portion of the orbits demonstrate no acute abnormality. SINUSES: The visualized paranasal sinuses and mastoid air cells demonstrate  no acute abnormality. BONES/SOFT TISSUES: The bone marrow signal intensity appears normal. The soft  tissues demonstrate no acute abnormality. Impression  No acute abnormality. No results found for this or any previous visit.     No results found for this or any previous visit. No results found for this or any previous visit. Assessment:  Principal Problem:    Seizure disorder ([facility]  Active Problems:    Vitamin D deficiency    Anxiety and depression    Morbid obesity with BMI of 40.0-44.9, adult ([address]    AMS (altered mental status)    New onset seizure ([facility]    Hypokalemia  Resolved Problems:    * No resolved hospital problems. *    39 year old man with new onset seizure 2 years ago, presented with his 2rd and remained post ictal for 3-4 days and had another 4 seizures during this hospital stay, now with sinus tachycardia    Plan:    1. VPA level is 120 today  2. Upon reviewing his vital history since admision, I strongly suspect depakote is likely the culprit causing his to have sinus tachycardia, arrhthymias is a common side effect of depakote  3. As a result, will d/c depakote for now  4. Will use vimpat as the sole agent, increase it to 150mg BID, upon reflection, pt seizures stopped all together after vimpat was added, pt was still having seizure while on depakote, therefore given the current sinus tachycardia, vimpat is probably a better agent for him    5. keppra is avoided because pt stated that he can occasionally get angry, family confirmed it, however, I told pt today, if his seizure remains difficult to treat, adding keppra will be taken into consideration  6. Ativan 1mg PRN for breakthrough sz  7. Will follow closely, I think pt should stay for another to monitor his new tachycardia  8. MRI brain w contrast tomorrow for epilepsy workup  9.  Please call me immediately if there is a seizure    Li Elizabeth MD 1/16/2022 12:22 PM       Rachel Martinez MD  Attending Neurologist/Neurointensivist

## 2022-01-16 NOTE — FLOWSHEET NOTE
Dr. Bari Newman arrived to the patient's bedside and was updated on the patient's current status via RN. Patient remains ST and had a couple of episodes with HR in 140-150's while sleeping today. New order received to discontinue depakote. Dr. Bari Newman stated the depakote may be causing the elevated HR. Plan to increase vimpat (Dr. Bari Newman to place order). Per neurology, patient to remain under our care overnight. Will continue to monitor closely.

## 2022-01-16 NOTE — PLAN OF CARE
Problem: Falls - Risk of:  Goal: Will remain free from falls  Description: Will remain free from falls  1/15/2022 2009 by Aury Dennis RN  Outcome: Ongoing  Note: Pt free from falls, fall risk education reinforced this shift. Problem: Pain:  Goal: Control of acute pain  Description: Control of acute pain  1/15/2022 2009 by Aury Dennis RN  Outcome: Ongoing  Note: Pt denies pain this shift. Problem: Physical Regulation:  Goal: Ability to maintain a stable neurologic state will improve  Description: Ability to maintain a stable neurologic state will improve  1/15/2022 2009 by Aury Dennis RN  Outcome: Ongoing  Note: Pt alert and oriented x 4 this shift. Problem: Skin Integrity:  Goal: Absence of new skin breakdown  Description: Absence of new skin breakdown  1/15/2022 2009 by Aury Dennis RN  Outcome: Ongoing  Note: Skin assessment completed, no new breakdown noted this shift.
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
## 2022-01-16 NOTE — FLOWSHEET NOTE
Dr. Clark arrived to the patient's bedside for evaluation and was updated on the patient's current status via RN. No additional orders at this time. Possible discharge home later today. Will continue to monitor closely.

## 2022-01-16 NOTE — PROGRESS NOTES
Physical Therapy  DATE: 2022    NAME: Kiko De La Garza  MRN: [id number]   : 1980    Patient not seen this date for Physical Therapy due to:      [] Cancel by RN or physician due to:    [] Hemodialysis    [] Critical Lab Value Level     [] Blood transfusion in progress    [] Acute or unstable cardiovascular status   _MAP < 55 or more than >115  _HR < 40 or > 130    [] Acute or unstable pulmonary status   -FiO2 > 60%   _RR < 5 or >40    _O2 sats < 85%    [] Strict Bedrest    [] Off Unit for surgery or procedure    [] Off Unit for testing       [] Pending imaging to R/O fracture    [x] Refusal by Patient      [] Other      [] PT being discontinued at this time. Patient independent. No further needs. [] PT being discontinued at this time as the patient has been transferred to hospice care. No further needs.       [facility], PT

## 2022-01-17 PROCEDURE — 6370000000 HC RX 637 (ALT 250 FOR IP): Performed by: PSYCHIATRY & NEUROLOGY

## 2022-01-17 PROCEDURE — 2580000003 HC RX 258: Performed by: NURSE PRACTITIONER

## 2022-01-17 PROCEDURE — 99233 SBSQ HOSP IP/OBS HIGH 50: CPT | Performed by: PSYCHIATRY & NEUROLOGY

## 2022-01-17 PROCEDURE — 97535 SELF CARE MNGMENT TRAINING: CPT

## 2022-01-17 PROCEDURE — 6360000002 HC RX W HCPCS: Performed by: NURSE PRACTITIONER

## 2022-01-17 PROCEDURE — 1200000000 HC SEMI PRIVATE

## 2022-01-17 PROCEDURE — 97116 GAIT TRAINING THERAPY: CPT

## 2022-01-17 PROCEDURE — 99238 HOSP IP/OBS DSCHRG MGMT 30/<: CPT | Performed by: INTERNAL MEDICINE

## 2022-01-17 PROCEDURE — 96372 THER/PROPH/DIAG INJ SC/IM: CPT

## 2022-01-17 RX ORDER — CLOTRIMAZOLE 1 %
CREAM (GRAM) TOPICAL
Qty: 45 G | Refills: 1 | Status: SHIPPED | OUTPATIENT
Start: 2022-01-17 | End: 2022-02-11 | Stop reason: SDUPTHER

## 2022-01-17 RX ORDER — LACOSAMIDE 150 MG/1
150 TABLET ORAL 2 TIMES DAILY
Qty: 60 TABLET | Refills: 0 | Status: SHIPPED
Start: 2022-01-17 | End: 2022-01-18 | Stop reason: HOSPADM

## 2022-01-17 RX ORDER — LACOSAMIDE 100 MG/1
100 TABLET ORAL 2 TIMES DAILY
Status: DISCONTINUED | OUTPATIENT
Start: 2022-01-17 | End: 2022-01-18 | Stop reason: HOSPADM

## 2022-01-17 RX ADMIN — ENOXAPARIN SODIUM 30 MG: 100 INJECTION SUBCUTANEOUS at 08:41

## 2022-01-17 RX ADMIN — LACOSAMIDE 150 MG: 100 TABLET, FILM COATED ORAL at 08:41

## 2022-01-17 RX ADMIN — SODIUM CHLORIDE, PRESERVATIVE FREE 10 ML: 5 INJECTION INTRAVENOUS at 22:29

## 2022-01-17 RX ADMIN — SODIUM CHLORIDE, PRESERVATIVE FREE 10 ML: 5 INJECTION INTRAVENOUS at 08:42

## 2022-01-17 RX ADMIN — ENOXAPARIN SODIUM 30 MG: 100 INJECTION SUBCUTANEOUS at 22:27

## 2022-01-17 RX ADMIN — LACOSAMIDE 100 MG: 100 TABLET, FILM COATED ORAL at 22:27

## 2022-01-17 ASSESSMENT — PAIN DESCRIPTION - DESCRIPTORS: DESCRIPTORS: ACHING

## 2022-01-17 ASSESSMENT — PAIN - FUNCTIONAL ASSESSMENT: PAIN_FUNCTIONAL_ASSESSMENT: PREVENTS OR INTERFERES SOME ACTIVE ACTIVITIES AND ADLS

## 2022-01-17 ASSESSMENT — PAIN DESCRIPTION - PAIN TYPE: TYPE: ACUTE PAIN

## 2022-01-17 ASSESSMENT — PAIN DESCRIPTION - ONSET: ONSET: ON-GOING

## 2022-01-17 ASSESSMENT — PAIN SCALES - GENERAL: PAINLEVEL_OUTOF10: 10

## 2022-01-17 ASSESSMENT — PAIN DESCRIPTION - ORIENTATION: ORIENTATION: LEFT

## 2022-01-17 ASSESSMENT — PAIN DESCRIPTION - PROGRESSION: CLINICAL_PROGRESSION: NOT CHANGED

## 2022-01-17 ASSESSMENT — PAIN DESCRIPTION - LOCATION: LOCATION: ANKLE

## 2022-01-17 ASSESSMENT — PAIN DESCRIPTION - FREQUENCY: FREQUENCY: CONTINUOUS
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
## 2022-01-17 NOTE — PLAN OF CARE
Problem: Falls - Risk of:  Goal: Will remain free from falls  Description: Will remain free from falls  Outcome: Ongoing  Note: Patient is a fall risk during this admission. Fall risk assessment was performed. Patient is absent of falls. Bed is in the lowest position. Wheels on the bed are locked. Call light and bed side table are within reach. Clutter is removed. Patient was educated to call out when needing assistance or wanting to get out of bed. Patient offered toileting assistance during rounding. Hourly rounds have been performed.     Goal: Absence of physical injury  Description: Absence of physical injury  Outcome: Ongoing     Problem: Safety:  Goal: Free from accidental physical injury  Description: Free from accidental physical injury  Outcome: Ongoing  Goal: Free from intentional harm  Description: Free from intentional harm  Outcome: Ongoing  Goal: Ability to remain free from injury will improve  Description: Ability to remain free from injury will improve  Outcome: Ongoing     Problem: Pain:  Goal: Pain level will decrease  Description: Pain level will decrease  Outcome: Ongoing  Goal: Control of acute pain  Description: Control of acute pain  Outcome: Ongoing     Problem: Health Behavior:  Goal: Ability to manage health-related needs will improve  Description: Ability to manage health-related needs will improve  Outcome: Ongoing     Problem: Physical Regulation:  Goal: Signs of adequate cerebral perfusion will increase  Description: Signs of adequate cerebral perfusion will increase  Outcome: Ongoing  Goal: Ability to maintain a stable neurologic state will improve  Description: Ability to maintain a stable neurologic state will improve  Outcome: Ongoing     Problem: Self-Concept:  Goal: Level of anxiety will decrease  Description: Level of anxiety will decrease  Outcome: Ongoing  Goal: Ability to verbalize feelings about condition will improve  Description: Ability to verbalize feelings about condition will improve  Outcome: Ongoing     Problem: Skin Integrity:  Goal: Will show no infection signs and symptoms  Description: Will show no infection signs and symptoms  Outcome: Ongoing  Goal: Absence of new skin breakdown  Description: Absence of new skin breakdown  Outcome: Ongoing

## 2022-01-17 NOTE — PROGRESS NOTES
NEUROLOGY INPATIENT PROGRESS NOTE    Patient- Analy Mora    MRN -  [id number]   Acct # - [de-identified]      - 1980    37 y.o. Subjective: The patient is seen as follow up for seizures. Patient is lethargic at this time. No seizure or urinary incontinence overnight, pt feels that he is completely back to normal, he feels very good, but complaint of a bit lightheaded today      Tachycardia events completely resolved. Awaiting for MRI brain w con to be done      Objective:   /71   Pulse 92   Temp 97.8 °F (36.6 °C) (Oral)   Resp 18   Ht 5' 6\" (1.676 m)   Wt 238 lb 0.1 oz (108 kg)   SpO2 100%   BMI 38.42 kg/m²       Intake/Output Summary (Last 24 hours) at 2022 1305  Last data filed at 2022 6810  Gross per 24 hour   Intake 480 ml   Output 450 ml   Net 30 ml       Physical Exam:  General:  awake, sitting on bed,  not in distress. Language is intact. AAO X4  HEENT: pink conjunctiva, unicteric sclera, moist oral mucosa. There is no neck lymphadenopathy. Neck: There is no carotid bruits. The Neck is supple. Neuro: CN 2-12 grossly intact with no focal deficits. Power 5/5 Throughout symmetric,  Long tracts are intact. Cerebellar exam is Intact. Sensory exam is intact to light touch. Gait is normal. No abnormal movement. Osteo: There is no LROM of any of the 4 extremity joints, no joint tenderness. Leg edema none  Skin: no lesions, no rash, warm and moist to touch. Abdomen is soft intact bowel sounds. ROS:    Cardiac: no chest pain. No palpitations. Renal : no flank pain, no hematuria  Skin: no rash    Medications:     lacosamide  100 mg Oral BID    sodium chloride flush  5-40 mL IntraVENous 2 times per day    enoxaparin  30 mg SubCUTAneous BID    vitamin D  50,000 Units Oral Weekly       Data:   CBC:   No results for input(s): WBC, HGB, PLT in the last 72 hours.   BMP:    No results for input(s): NA, K, CL, CO2, BUN, CREATININE, GLUCOSE in the last 72 hours. No results found for this or any previous visit. No results found for this or any previous visit. No results found for this or any previous visit. No results found for this or any previous visit. Results for orders placed during the hospital encounter of 01/11/22    MRI BRAIN WO CONTRAST    Narrative  EXAMINATION:  MRI OF THE BRAIN WITHOUT CONTRAST  1/12/2022 3:40 pm    TECHNIQUE:  Multiplanar multisequence MRI of the brain was performed without the  administration of intravenous contrast.    COMPARISON:  None. HISTORY:  ORDERING SYSTEM PROVIDED HISTORY: altered mental status, blurry vision  TECHNOLOGIST PROVIDED HISTORY:  altered mental status, blurry vision  Reason for Exam: Pt to ER yesterday for AMS, hallucinations, and blurry  vision. Possible hx of seizures. FINDINGS:  The examination is motion degraded. INTRACRANIAL STRUCTURES/VENTRICLES: There is no acute infarct. No mass effect  or midline shift. No evidence of an acute intracranial hemorrhage. The  ventricles and sulci are normal in size and configuration. The  sellar/suprasellar regions appear unremarkable. The normal signal voids  within the major intracranial vessels appear maintained. ORBITS: The visualized portion of the orbits demonstrate no acute abnormality. SINUSES: The visualized paranasal sinuses and mastoid air cells demonstrate  no acute abnormality. BONES/SOFT TISSUES: The bone marrow signal intensity appears normal. The soft  tissues demonstrate no acute abnormality. Impression  No acute abnormality. No results found for this or any previous visit. No results found for this or any previous visit. No results found for this or any previous visit.         Assessment:  Principal Problem:    Seizure disorder ([address]  Active Problems:    Vitamin D deficiency    Anxiety and depression    Morbid obesity with BMI of 40.0-44.9, adult ([address]    AMS (altered mental status) New onset seizure ([address]    Hypokalemia  Resolved Problems:    * No resolved hospital problems. *      Short Summary of seizure meds:  - lamictal 25mg was started on day 1, but pt still has seizures on EEG and actual seizure  - lamictal was discontinued 2nd day and VPA 1g was loaded and placed on 500mg BID, pt still con't to have 3 more seizures and 1 seizure with postictal state on the second EEG of 2.5 hours, vimpat 200mg bolus and 100mg BID was added. After vimpat added, all seizures stopped. - on day 4 patient developed unexplained sinus tachycardia into the 140s, I suspected depakote is the cause of it, his VPA level at that point was 120, I discontinued depakote all together and his heart rate return to normal.      37year old man with new onset seizure 2 years ago, presented with his 3rd and remained post ictal for 3-4 days and had another 4 seizures during this hospital stay, now with sinus tachycardia, resolved after cessation of depakote    Plan:    1. Pt complaint of dizziness (lightheaded), this could be from vimpat, will decrease the dose to 100mg BID    2. keppra is avoided because pt stated that he can occasionally get angry, family confirmed it, however, I told pt today, if his seizure remains difficult to treat, adding keppra will be taken into consideration  3. MRI brain w contrast today  4. Once MRI brain done, pt can be discharged home from the neurological standpoint  5. F/u with Dr. Madisyn Smith in the neurology clinic in 1 month, during this time, pt can follow up with me for quick check up, I recommended pt to follow up with Dr. Madisyn Smith moving forward  6.  Please call me immediately if there is a seizure    Blayne Langston MD 1/17/2022 1:05 PM       Lise Osullivan MD  Attending Neurologist/Neurointensivist
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
## 2022-01-18 ENCOUNTER — APPOINTMENT (OUTPATIENT)
Dept: MRI IMAGING | Age: 42
DRG: 053 | End: 2022-01-18
Payer: MEDICAID

## 2022-01-18 VITALS
BODY MASS INDEX: 38.57 KG/M2 | TEMPERATURE: 98.4 F | HEART RATE: 91 BPM | SYSTOLIC BLOOD PRESSURE: 145 MMHG | RESPIRATION RATE: 16 BRPM | WEIGHT: 240 LBS | OXYGEN SATURATION: 98 % | DIASTOLIC BLOOD PRESSURE: 73 MMHG | HEIGHT: 66 IN

## 2022-01-18 PROCEDURE — 97112 NEUROMUSCULAR REEDUCATION: CPT

## 2022-01-18 PROCEDURE — 70553 MRI BRAIN STEM W/O & W/DYE: CPT

## 2022-01-18 PROCEDURE — 96372 THER/PROPH/DIAG INJ SC/IM: CPT

## 2022-01-18 PROCEDURE — 97116 GAIT TRAINING THERAPY: CPT

## 2022-01-18 PROCEDURE — 6360000004 HC RX CONTRAST MEDICATION: Performed by: PSYCHIATRY & NEUROLOGY

## 2022-01-18 PROCEDURE — 6360000002 HC RX W HCPCS: Performed by: NURSE PRACTITIONER

## 2022-01-18 PROCEDURE — A9579 GAD-BASE MR CONTRAST NOS,1ML: HCPCS | Performed by: PSYCHIATRY & NEUROLOGY

## 2022-01-18 PROCEDURE — 6370000000 HC RX 637 (ALT 250 FOR IP): Performed by: PSYCHIATRY & NEUROLOGY

## 2022-01-18 PROCEDURE — 99232 SBSQ HOSP IP/OBS MODERATE 35: CPT | Performed by: INTERNAL MEDICINE

## 2022-01-18 PROCEDURE — 97530 THERAPEUTIC ACTIVITIES: CPT

## 2022-01-18 PROCEDURE — 2580000003 HC RX 258: Performed by: NURSE PRACTITIONER

## 2022-01-18 PROCEDURE — 99233 SBSQ HOSP IP/OBS HIGH 50: CPT | Performed by: PSYCHIATRY & NEUROLOGY

## 2022-01-18 RX ORDER — LACOSAMIDE 100 MG/1
100 TABLET ORAL 2 TIMES DAILY
Qty: 60 TABLET | Refills: 0 | Status: SHIPPED | OUTPATIENT
Start: 2022-01-18 | End: 2022-01-19 | Stop reason: SDUPTHER

## 2022-01-18 RX ADMIN — SODIUM CHLORIDE, PRESERVATIVE FREE 10 ML: 5 INJECTION INTRAVENOUS at 09:22

## 2022-01-18 RX ADMIN — GADOTERIDOL 20 ML: 279.3 INJECTION, SOLUTION INTRAVENOUS at 09:06

## 2022-01-18 RX ADMIN — ENOXAPARIN SODIUM 30 MG: 100 INJECTION SUBCUTANEOUS at 08:28

## 2022-01-18 RX ADMIN — LACOSAMIDE 100 MG: 100 TABLET, FILM COATED ORAL at 09:22

## 2022-01-18 NOTE — PLAN OF CARE
Problem: Falls - Risk of:  Goal: Will remain free from falls  Description: Will remain free from falls  1/17/2022 2341 by Anselmo Marshall RN  Outcome: Ongoing  Note: Siderails up x 2  Hourly rounding  Call light in reach  Instructed to call for assist before attempting out of bed. Remains free from falls and accidental injury at this time   Floor free from obstacles  Bed is locked and in lowest position  Adequate lighting provided  Bed alarm on, [place] star and Stay with Me signs posted         Problem: Falls - Risk of:  Goal: Absence of physical injury  Description: Absence of physical injury  Outcome: Ongoing     Problem: Safety:  Goal: Free from accidental physical injury  Description: Free from accidental physical injury  1/17/2022 2341 by Anselmo Marshall RN  Outcome: Ongoing     Problem: Safety:  Goal: Free from intentional harm  Description: Free from intentional harm  Outcome: Ongoing     Problem: Safety:  Goal: Ability to remain free from injury will improve  Description: Ability to remain free from injury will improve  Outcome: Ongoing     Problem: Pain:  Goal: Pain level will decrease  Description: Pain level will decrease  Outcome: Ongoing  Note: Pain level assessment complete.    Patient educated on pain scale and control interventions  PRN pain medication given per patient request  Patient instructed to call out with new onset of pain or unrelieved pain       Problem: Pain:  Goal: Control of acute pain  Description: Control of acute pain  Outcome: Ongoing     Problem: Health Behavior:  Goal: Ability to manage health-related needs will improve  Description: Ability to manage health-related needs will improve  Outcome: Ongoing     Problem: Physical Regulation:  Goal: Signs of adequate cerebral perfusion will increase  Description: Signs of adequate cerebral perfusion will increase  Outcome: Ongoing     Problem: Physical Regulation:  Goal: Ability to maintain a stable neurologic state will improve  Description: Ability to maintain a stable neurologic state will improve  1/17/2022 2341 by Ameya Rogers RN  Outcome: Ongoing    Problem: Self-Concept:  Goal: Level of anxiety will decrease  Description: Level of anxiety will decrease  Outcome: Ongoing     Problem: Self-Concept:  Goal: Ability to verbalize feelings about condition will improve  Description: Ability to verbalize feelings about condition will improve  Outcome: Ongoing     Problem: Skin Integrity:  Goal: Will show no infection signs and symptoms  Description: Will show no infection signs and symptoms  1/17/2022 2341 by Ameya Rogers RN  Outcome: Ongoing     Problem: Skin Integrity:  Goal: Absence of new skin breakdown  Description: Absence of new skin breakdown  Outcome: Ongoing Implemented All Universal Safety Interventions:  [place] to call system. Call bell, personal items and telephone within reach. Instruct patient to call for assistance. Room bathroom lighting operational. Non-slip footwear when patient is off stretcher. Physically safe environment: no spills, clutter or unnecessary equipment. Stretcher in lowest position, wheels locked, appropriate side rails in place.

## 2022-01-18 NOTE — CARE COORDINATION
Hospital F/U appointment scheduled with Dr. Daphnie Leonard 3-23 at 11:20am (1st available)  VM left with Dr. Anselmo Sullivan office to schedule sooner appt prior to seeing Dr. Daphnie Leonard.

## 2022-01-18 NOTE — PROGRESS NOTES
Pt discharged to home with belongings. Discharge instructions given. AVS understood and signed. Pt denies having any further questions at this time. Patient/family state they have everything they were admitted with. Patient informed of follow up appointment.
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
## 2022-01-18 NOTE — PROGRESS NOTES
NEUROLOGY INPATIENT PROGRESS NOTE    Patient- Kostas Deutsch    MRN -  [id number]   Acct # - [de-identified]      - 1980    37 y.o. Subjective: The patient is seen as follow up for seizures. Patient is lethargic at this time. No seizure or urinary incontinence overnight, pt feels good, no more lightheaded feeling after vimpat decreased to 100mg BID      Tachycardia events completely resolved as well. Objective:   BP (!) 145/73   Pulse 91   Temp 98.4 °F (36.9 °C) (Oral)   Resp 16   Ht 5' 6\" (1.676 m)   Wt 240 lb (108.9 kg)   SpO2 98%   BMI 38.74 kg/m²       Intake/Output Summary (Last 24 hours) at 2022 1504  Last data filed at 2022 0517  Gross per 24 hour   Intake    Output 500 ml   Net -500 ml       Physical Exam:  General:  awake, sitting on bed,  not in distress. Language is intact. AAO X4  HEENT: pink conjunctiva, unicteric sclera, moist oral mucosa. There is no neck lymphadenopathy. Neck: There is no carotid bruits. The Neck is supple. Neuro: CN 2-12 grossly intact with no focal deficits. Power 5/5 Throughout symmetric,  Long tracts are intact. Cerebellar exam is Intact. Sensory exam is intact to light touch. Gait is normal. No abnormal movement. Osteo: There is no LROM of any of the 4 extremity joints, no joint tenderness. Leg edema none  Skin: no lesions, no rash, warm and moist to touch. Abdomen is soft intact bowel sounds. ROS:    Cardiac: no chest pain. No palpitations. Renal : no flank pain, no hematuria  Skin: no rash    Medications:     lacosamide  100 mg Oral BID    sodium chloride flush  5-40 mL IntraVENous 2 times per day    enoxaparin  30 mg SubCUTAneous BID    vitamin D  50,000 Units Oral Weekly       Data:   CBC:   No results for input(s): WBC, HGB, PLT in the last 72 hours. BMP:    No results for input(s): NA, K, CL, CO2, BUN, CREATININE, GLUCOSE in the last 72 hours.         No results found for this or any previous visit. No results found for this or any previous visit. No results found for this or any previous visit. No results found for this or any previous visit. Results for orders placed during the hospital encounter of 01/11/22    MRI BRAIN WO CONTRAST    Narrative  EXAMINATION:  MRI OF THE BRAIN WITHOUT CONTRAST  1/12/2022 3:40 pm    TECHNIQUE:  Multiplanar multisequence MRI of the brain was performed without the  administration of intravenous contrast.    COMPARISON:  None. HISTORY:  ORDERING SYSTEM PROVIDED HISTORY: altered mental status, blurry vision  TECHNOLOGIST PROVIDED HISTORY:  altered mental status, blurry vision  Reason for Exam: Pt to ER yesterday for AMS, hallucinations, and blurry  vision. Possible hx of seizures. FINDINGS:  The examination is motion degraded. INTRACRANIAL STRUCTURES/VENTRICLES: There is no acute infarct. No mass effect  or midline shift. No evidence of an acute intracranial hemorrhage. The  ventricles and sulci are normal in size and configuration. The  sellar/suprasellar regions appear unremarkable. The normal signal voids  within the major intracranial vessels appear maintained. ORBITS: The visualized portion of the orbits demonstrate no acute abnormality. SINUSES: The visualized paranasal sinuses and mastoid air cells demonstrate  no acute abnormality. BONES/SOFT TISSUES: The bone marrow signal intensity appears normal. The soft  tissues demonstrate no acute abnormality. Impression  No acute abnormality. No results found for this or any previous visit. No results found for this or any previous visit. No results found for this or any previous visit.         Assessment:  Principal Problem:    Seizure disorder ([address]  Active Problems:    Vitamin D deficiency    Anxiety and depression    Morbid obesity with BMI of 40.0-44.9, adult ([address]    AMS (altered mental status)    New onset seizure ([address]    Hypokalemia  Resolved Problems:    * No resolved hospital problems. *      Short Summary of seizure meds:  - lamictal 25mg was started on day 1, but pt still has seizures on EEG and actual seizure  - lamictal was discontinued 2nd day and VPA 1g was loaded and placed on 500mg BID, pt still con't to have 3 more seizures and 1 seizure with postictal state on the second EEG of 2.5 hours, vimpat 200mg bolus and 100mg BID was added. After vimpat added, all seizures stopped. - on day 4 patient developed unexplained sinus tachycardia into the 140s, I suspected depakote is the cause of it, his VPA level at that point was 120, I discontinued depakote all together and his heart rate return to normal.      37year old man with new onset seizure 2 years ago, presented with his 3rd and remained post ictal for 3-4 days and had another 4 seizures during this hospital stay    Plan:    1. con't vimpat 100mg BID    2. keppra is avoided because pt stated that he can occasionally get angry, family confirmed it, however, I told pt today, if his seizure remains difficult to treat, adding keppra will be taken into consideration  3. MRI brain w contrast is unremarkable  4. Pt can go home today  5. f/u with Dr. Lucio Mojica in the neurology clinic in 2 month, during this time, pt can follow up with me for quick check up, I recommended pt to follow up with Dr. Lucio Mojica moving forward  6.  Please call me immediately if there is a seizure    Blanche Pedersen MD 1/18/2022 3:04 PM       Renetta Mercedes MD  Attending Neurologist/Neurointensivist

## 2022-01-18 NOTE — DISCHARGE SUMMARY
[facility]  Office: [address], DO, Jose De Jesus Barcenas, DO, Fabienne Dunn, DO, Hay Clark, DO, Nigel Cooper MD, Shahbaz Keller MD, Denita Dodd MD, Jelly Moreno MD, Carson Ngo MD, Licha Mayorga MD, Cathi Tarango MD, Shannan Berger, DO, Lindy Cramer, DO, Sonja Fallon MD,  Tracie Terry DO, Josey Peña MD, Vidhi Shepherd MD, Neha Ledbetter MD, Fred Maciel MD, Tawny Brown MD, Bud Brambila MD, Edwin Nuñez MD, Allyson aCry [facility], [facility], CNP, Antonio Guerra, CNP, Brain Hargrove, CNS, Elvira Aceves, [facility], Beryle Fix, CNP, Brian Randolph, CNP, Kali Crespo, CNP, Fabricio Velasquez, CNP, Mayra Pollock PA-C, Malik Diaz, [facility], Jose Eduardo Daugherty, [facility], Negar Hernandez, CNP, Kenn Macario, CNP, Kamla Valdez, CNP, Ana Bragg, CNP, Tyrese Guajardo, CNP, Colin Rehman, [facility]    Discharge Summary     Patient ID: Sloan Guerra  :  1980   MRN: [id number]     ACCOUNT:  [de-identified]   Patient's PCP: ALEXA Pinzon NP  Admit Date: 2022   Discharge Date: 2022    Length of Stay: 3  Code Status:  Full Code  Admitting Physician: No admitting provider for patient encounter. Discharge Physician: Kermit Schilder, MD     Active Discharge Diagnoses:     Hospital Problem Lists:  Principal Problem:    Seizure disorder [facility])  Active Problems:    Vitamin D deficiency    Anxiety and depression    Morbid obesity with BMI of 40.0-44.9, adult ([facility] [address]    AMS (altered mental status)    New onset seizure ([facility] [address]    Hypokalemia  Resolved Problems:    * No resolved hospital problems.  *      Admission Condition:  poor     Discharged Condition: stable    Hospital Stay:   Admitting history:  As documented per  RUFINO:  \"Jefry BESSY Coby is a 41 y.o. Non- / non  male who presents with Altered Mental Status   and is admitted to the hospital for the management of AMS (altered mental status).     Patient had been evaluated at [facility] in the ED for complaints of altered mental status. Patient alla endorses patient had a seizure 2 days ago. Patient has a hx of seizure activity, but currently is not on any medication. Patient complains of feeling though he is drowning at times. He believes he is in the psych [place], has poor attention, and is disoriented to year. Home medications include gabapentin and zoloft. CT of the head was completed at [facility] ED and was unremarkable. He was discharged home from [facility] ED with discharge instructions for schizophrenia, as it was felt that the patient was having a mild psychotic episode. \"     Had confirmed seizures here in hospital    Hospital Course:   Patient back to his baseline  Feels better  No further seizures     Plan:         MRI W, W. Wo contrast unremarkable  Currently on Vimpat 100 mg 2 times daily per neurology plan  No driving till evaluated and  cleared by neurology  Follow-up with neurologist as outpatient as scheduled  Discharge home today          Significant therapeutic interventions: See above notes    Significant Diagnostic Studies:   Labs / Micro:  CBC:   Lab Results   Component Value Date    WBC 9.0 01/13/2022    RBC 5.10 01/13/2022    HGB 13.2 01/13/2022    HCT 42.2 01/13/2022    MCV 82.7 01/13/2022    MCH 25.9 01/13/2022    MCHC 31.3 01/13/2022    RDW 13.4 01/13/2022     01/13/2022     BMP:    Lab Results   Component Value Date    GLUCOSE 107 01/13/2022     01/13/2022    K 4.0 01/13/2022     01/13/2022    CO2 23 01/13/2022    ANIONGAP 13 01/13/2022    BUN 9 01/13/2022    CREATININE 0.71 01/13/2022    BUNCRER 13 01/13/2022    CALCIUM 9.1 01/13/2022    LABGLOM >60 01/13/2022    GFRAA >60 01/13/2022    GFR      01/13/2022    GFR NOT REPORTED 01/13/2022     HFP:    Lab Results   Component Value Date    PROT 7.7 01/11/2022     CMP:    Lab Results   Component Value Date    GLUCOSE 107 01/13/2022     01/13/2022 K 4.0 01/13/2022     01/13/2022    CO2 23 01/13/2022    BUN 9 01/13/2022    CREATININE 0.71 01/13/2022    ANIONGAP 13 01/13/2022    ALKPHOS 60 01/11/2022    ALT 26 01/11/2022    AST 35 01/11/2022    BILITOT 0.68 01/11/2022    LABALBU 4.2 01/11/2022    ALBUMIN NOT REPORTED 01/11/2022    LABGLOM >60 01/13/2022    GFRAA >60 01/13/2022    GFR      01/13/2022    GFR NOT REPORTED 01/13/2022    PROT 7.7 01/11/2022    CALCIUM 9.1 01/13/2022     PT/INR:  No results found for: PTINR, PROTIME, INR  PTT: No results found for: APTT  FLP:  No results found for: CHOL, TRIG, HDL  U/A:    Lab Results   Component Value Date    COLORU Yellow 01/11/2022    TURBIDITY Clear 01/11/2022    SPECGRAV 1.038 01/11/2022    HGBUR 1+ 01/11/2022    PHUR 6.0 01/11/2022    PROTEINU 1+ 01/11/2022    GLUCOSEU NEGATIVE 01/11/2022    KETUA 2+ 01/11/2022    BILIRUBINUR  01/11/2022     Presumptive positive. Unable to confirm due to unavailability of reagent. UROBILINOGEN Normal 01/11/2022    NITRU NEGATIVE 01/11/2022    LEUKOCYTESUR NEGATIVE 01/11/2022     TSH:    Lab Results   Component Value Date    TSH 1.63 07/15/2020        Radiology:  XR CHEST PORTABLE    Result Date: 1/13/2022  No acute intrathoracic pathology. MRI BRAIN W WO CONTRAST    Result Date: 1/18/2022  1. Minimal T2 hyperintensity within the periaqueductal gray matter, right greater than left. No associated contrast enhancement. 2. Otherwise, no acute intracranial abnormality. No acute infarct. 3. The hippocampal structures appear symmetric. MRI BRAIN WO CONTRAST    Result Date: 1/12/2022  No acute abnormality. Consultations:    Consults:     Final Specialist Recommendations/Findings:   IP CONSULT TO HOSPITALIST  IP CONSULT TO NEUROLOGY  IP CONSULT TO PSYCHIATRY      The patient was seen and examined on day of discharge and this discharge summary is in conjunction with any daily progress note from day of discharge.     Discharge plan:     Disposition: Home    Physician Follow Up:     ALEXA Ponce NP  [address] [facility] [place] [address]  [phone]    In 1 week      Lulu Ferro MD  [facility] [address] [facility]  [phone]    Go on 3/23/2022  Appointment 2- at 11:20am (1st available)       Requiring Further Evaluation/Follow Up POST HOSPITALIZATION/Incidental Findings: Follow-up with neurology as scheduled    Diet: regular diet    Activity: As tolerated    Instructions to Patient: No driving till cleared by neurology    Discharge Medications:      Medication List      START taking these medications    lacosamide 100 MG Tabs tablet  Commonly known as: VIMPAT  Take 1 tablet by mouth 2 times daily for 30 days. CONTINUE taking these medications    acetaminophen 500 MG tablet  Commonly known as: TYLENOL     clotrimazole 1 % cream  Commonly known as: Lotrimin AF  Apply topically 2 times daily. sertraline 50 MG tablet  Commonly known as: Zoloft  Take 1.5 tablets by mouth daily        STOP taking these medications    oxyCODONE-acetaminophen  MG per tablet  Commonly known as: PERCOCET           Where to Get Your Medications      These medications were sent to [facility] [address] [place] [address]    Phone: [phone]   clotrimazole 1 % cream  lacosamide 100 MG Tabs tablet         No discharge procedures on file. Time Spent on discharge is  31 mins in patient examination, evaluation, counseling as well as medication reconciliation, prescriptions for required medications, discharge plan and follow up. Electronically signed by   Aneesh Marie MD  1/18/2022  5:11 PM      Thank you ALEXA Martinez NP for the opportunity to be involved in this patient's care.

## 2022-01-18 NOTE — PLAN OF CARE
Problem: Falls - Risk of:  Goal: Will remain free from falls  Description: Will remain free from falls  Outcome: Ongoing  Note: Siderails up x 2  Hourly rounding  Call light in reach  Instructed to call for assist before attempting out of bed. Remains free from falls and accidental injury at this time   Floor free from obstacles  Bed is locked and in lowest position  Adequate lighting provided  Bed alarm on, [place] star and Stay with Me signs posted  Goal: Absence of physical injury  Description: Absence of physical injury  Outcome: Ongoing     Problem: Safety:  Goal: Free from accidental physical injury  Description: Free from accidental physical injury  Outcome: Ongoing  Goal: Free from intentional harm  Description: Free from intentional harm  Outcome: Ongoing  Goal: Ability to remain free from injury will improve  Description: Ability to remain free from injury will improve  Outcome: Ongoing     Problem: Pain:  Goal: Pain level will decrease  Description: Pain level will decrease  Outcome: Ongoing  Note: Pain level assessment complete.    Patient educated on pain scale and control interventions  PRN pain medication given per patient request  Patient instructed to call out with new onset of pain or unrelieved pain    Goal: Control of acute pain  Description: Control of acute pain  Outcome: Ongoing     Problem: Health Behavior:  Goal: Ability to manage health-related needs will improve  Description: Ability to manage health-related needs will improve  Outcome: Ongoing     Problem: Physical Regulation:  Goal: Signs of adequate cerebral perfusion will increase  Description: Signs of adequate cerebral perfusion will increase  Outcome: Ongoing  Goal: Ability to maintain a stable neurologic state will improve  Description: Ability to maintain a stable neurologic state will improve  Outcome: Ongoing     Problem: Self-Concept:  Goal: Level of anxiety will decrease  Description: Level of anxiety will
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
## 2022-01-19 ENCOUNTER — TELEPHONE (OUTPATIENT)
Dept: FAMILY MEDICINE CLINIC | Age: 42
End: 2022-01-19

## 2022-01-19 DIAGNOSIS — G40.909 SEIZURE DISORDER (HCC): ICD-10-CM

## 2022-01-19 RX ORDER — LACOSAMIDE 100 MG/1
100 TABLET ORAL 2 TIMES DAILY
Qty: 60 TABLET | Refills: 0 | Status: SHIPPED | OUTPATIENT
Start: 2022-01-19 | End: 2022-04-07 | Stop reason: SDUPTHER

## 2022-01-19 NOTE — TELEPHONE ENCOUNTER
Attempted to contact patient to schedule a hospital f/u appt. No answer.   Unable to leave message, vm box is full

## 2022-01-20 NOTE — TELEPHONE ENCOUNTER
I have attempted to contact patient several times to schedule him for a hospital f/u. No answer.   Unable to leave message, vm box is full

## 2022-01-25 NOTE — TELEPHONE ENCOUNTER
Patient called in to inform you of recent admission. Did tell patient we tried reaching out a few times to schedule a follow up. Patient scheduled today.   Future Appointments   Date Time Provider Clarissa Noguera   1/28/2022  3:00 PM ALEXA Forbes NP [facility] [address]   3/23/2022 11:20 AM [facility]   4/4/2022 11:30 AM ALEXA Forbes NP [facility] [address]

## 2022-01-27 PROBLEM — Z09 HOSPITAL DISCHARGE FOLLOW-UP: Status: ACTIVE | Noted: 2022-01-27

## 2022-02-08 ENCOUNTER — TELEPHONE (OUTPATIENT)
Dept: FAMILY MEDICINE CLINIC | Age: 42
End: 2022-02-08

## 2022-02-08 NOTE — TELEPHONE ENCOUNTER
Patient states that he thinks the zoloft is causing his seizures. However, in the same sentence he stated that he had seizures prior to him establishing care with you. I told him that we need to get him in here for a follow up appt to discuss his meds. He said he has to speak with his fiance first and will call us back to make an appt.

## 2022-02-08 NOTE — TELEPHONE ENCOUNTER
----- Message from Jean Manriquez sent at 2/8/2022 12:27 PM EST -----  Subject: Message to Provider    QUESTIONS  Information for Provider? Pt would like a call back as soon as possible   from Harley Ramos regarding his medications.   ---------------------------------------------------------------------------  --------------  Genaro DOMINGUEZ  What is the best way for the office to contact you? Do not leave any   message, patient will call back for answer  Preferred Call Back Phone Number? [phone]  ---------------------------------------------------------------------------  --------------  SCRIPT ANSWERS  Relationship to Patient?  Self

## 2022-02-08 NOTE — TELEPHONE ENCOUNTER
If he would like to stop the medication he can. I would have him cut back to 50 mg (1 pill daily) for 1 week then 1/2 pill for 1 weeks then he can stop it.

## 2022-02-10 ENCOUNTER — TELEPHONE (OUTPATIENT)
Dept: FAMILY MEDICINE CLINIC | Age: 42
End: 2022-02-10

## 2022-02-10 NOTE — TELEPHONE ENCOUNTER
Pt came in late to an appointment. Provider was unable to see him 20 minutes late. He wants to discuss new depression pills and also wants an anti fungus cream ordered and sent to [facility]. He stated that it had previously been ordered and needs more. I told pt that Sonya Barrios would call him with an appointment and discuss what provider decides.

## 2022-02-11 ENCOUNTER — VIRTUAL VISIT (OUTPATIENT)
Dept: FAMILY MEDICINE CLINIC | Age: 42
End: 2022-02-11
Payer: MEDICAID

## 2022-02-11 DIAGNOSIS — G40.909 SEIZURE DISORDER (HCC): Primary | ICD-10-CM

## 2022-02-11 PROCEDURE — 99213 OFFICE O/P EST LOW 20 MIN: CPT | Performed by: NURSE PRACTITIONER

## 2022-02-11 RX ORDER — CLOTRIMAZOLE 1 %
CREAM (GRAM) TOPICAL
Qty: 45 G | Refills: 1 | Status: SHIPPED | OUTPATIENT
Start: 2022-02-11 | End: 2022-02-18

## 2022-02-11 ASSESSMENT — ENCOUNTER SYMPTOMS
CHEST TIGHTNESS: 0
CONSTIPATION: 0
ABDOMINAL DISTENTION: 0
COUGH: 0
SORE THROAT: 0
DIARRHEA: 0
VOMITING: 0
BACK PAIN: 0
ABDOMINAL PAIN: 0
NAUSEA: 0
SHORTNESS OF BREATH: 0
RHINORRHEA: 0

## 2022-02-11 NOTE — PROGRESS NOTES
Venice Goltz, APRN-CNP  [facility]  [address] [address], [address]  [address] [address]  Dept: [phone]  Dept Fax: [phone]     PATIENT ID: Florina Flores is a 37 y.o. male. HPI:  Established patient presents via virtual visit today for f/u on hospital admission. Patient had been evaluated at [facility] in the ED for complaints of altered mental status. Patient alla endorses patient had a seizure 2 days ago. Patient has a hx of seizure activity, but currently is not on any medication. Patient complains of feeling though he is drowning at times. He believes he is in the psych [place], has poor attention, and is disoriented to year. Home medications include gabapentin and zoloft. CT of the head was completed at [facility] ED and was unremarkable. He was discharged home from [facility] ED with discharge instructions for schizophrenia, as it was felt that the patient was having a mild psychotic episode. \" He was then taken to [facility] [place] where he was admitted for further workup. Notes were reviewed and it appears that seizure activity may have been present up to 2 years ago. During his admission he was seen by neurology. Lamictal 25mg was started on day 1 but pt still has seizures on EEG and actual seizure. Lamictal was discontinued 2nd day and VPA 1g was loaded and placed on 500mg BID. Pt still con't to have 3 more seizures and 1 seizure with postictal state on the second EEG of 2.5 hours, vimpat 200mg bolus and 100mg BID was added. After vimpat added, all seizures stopped. On day 4 patient developed unexplained sinus tachycardia into the 140s, I suspected depakote is the cause of it, his VPA level at that point was 120, neurology discontinued depakote all together and his heart rate return to normal. He was discharged 1/18 on Vimpat 100 mg BID.  It was noted that keppra is to be avoided because pt stated that he can occasionally get angry and family confirmed it, however, patient was told, if his seizure remains difficult to treat, adding keppra will be taken into consideration. MRI brain was done and unremarkable. He was instructed to follow up with Dr. Erick Granados in the neurology clinic in 2 months. He was instructed to call immediately if there were any seizure activity. Today he relates that he was told that the zoloft was causing his seizures. He relates that the zoloft caused his heart rate to go up. He also relates that since starting the Zoloft he has been hallucinating and not sleeping at all. Pt denies any fever or chills. Pt today denies any HA, chest pain, or SOB. Pt denies any N/V/D/C or abdominal pain. My previous office notes, labs and diagnostic studies were reviewed prior to and during encounter. The patient's past medical, surgical, social, and family history as well as his current medications and allergies were reviewed as documented in today's encounter by PERLITA Wiggins. Current Outpatient Medications on File Prior to Visit   Medication Sig Dispense Refill    lacosamide (VIMPAT) 100 MG TABS tablet Take 1 tablet by mouth 2 times daily for 30 days. 60 tablet 0    acetaminophen (TYLENOL) 500 MG tablet Take 1,000 mg by mouth every 6 hours as needed      sertraline (ZOLOFT) 50 MG tablet Take 1.5 tablets by mouth daily 135 tablet 1     No current facility-administered medications on file prior to visit. SUBJECTIVE:     Review of Systems   Constitutional: Negative for activity change, fatigue and fever. HENT: Negative for congestion, ear pain, rhinorrhea and sore throat. Respiratory: Negative for cough, chest tightness and shortness of breath. Cardiovascular: Negative for chest pain and palpitations. Gastrointestinal: Negative for abdominal distention, abdominal pain, constipation, diarrhea, nausea and vomiting. Endocrine: Negative for polydipsia, polyphagia and polyuria.    Genitourinary: Negative for difficulty urinating and dysuria. Musculoskeletal: Negative for arthralgias, back pain and myalgias. Skin: Negative for rash. Neurological: Positive for seizures (stable on Vimpat 100 mg BID). Negative for dizziness, weakness, light-headedness and headaches. Hematological: Negative for adenopathy. Psychiatric/Behavioral: Positive for dysphoric mood (not taking Zoloft; reported taking it as needed and not as prescribed (it was ordered to be taken daily)). Negative for agitation and behavioral problems. The patient is not nervous/anxious. OBJECTIVE:  There were no vitals taken during this encounter, as this was a virtual visit. Physical Exam  Vitals reviewed: Vital signs unavailable, as this is a virtual visit. Constitutional:       General: He is not in acute distress. Appearance: Normal appearance. He is not ill-appearing or toxic-appearing. Pulmonary:      Effort: No tachypnea or accessory muscle usage. Comments: Patient able to talk in full sentences without difficulty   Neurological:      General: No focal deficit present. Mental Status: He is alert and oriented to person, place, and time. Psychiatric:         Mood and Affect: Mood normal.         Speech: Speech normal.         Behavior: Behavior normal. Behavior is cooperative. ASSESSMENT:   Diagnosis Orders   1. Seizure disorder ([facility] [address]       PLAN:  1. Seizure disorder [facility])  - Recent hospitalization notes reviewed  - He was discharged 1/18 on Vimpat 100 mg BID. - He reports no seizure activity since being discharged from the hospital. He does relate that he has a follow up with neurology in March. - Patient related that he thinks the Zoloft is what caused his seizures and confusion.   - Patient reported not being able to sleep for days because of the Zoloft.    - Patient reported he was hallucinating because of the Zoloft  - Zoloft 50 mg daily started in April 2021  - Follow up visit June 2021 patient reported doing well with medication but thought that the dose needed to be increased  - Last visit was in January of 2021 and patient did not report any adverse symptoms or side effects of the Zoloft. Actually reported that he was doing well and did not think any dosing adjustments were needed  -  It was my understanding that he was taking the medication daily as prescribed but learned today that he was taking it as needed. - He has not been taking Zoloft since discharge from the hospital despite it being on is \"continue taking as directed\" list  - Will hold off on adding any additional anti-depressants until seen by neurology    - On this date February 11, 2022,  I have spent greater than 50% of this visit reviewing previous notes, test results and/or face to face with the patient discussing the diagnoses, importance of compliance with the treatment plan, counseling, coordinating care as well as documenting on the day of the visit. Ministerio Bustamante, was evaluated through a synchronous (real-time) audio-video encounter. The patient (or guardian if applicable) is aware that this is a billable service. Verbal consent to proceed has been obtained within the past 12 months. The visit was conducted pursuant to the emergency declaration under the [address] [place] [address], [address] [place] [address] authority and the [employer] and [employer] General Act. Patient identification was verified, and a caregiver was present when appropriate. The patient was located in a state where the provider was credentialed to provide care. Total time spent for this encounter: Not billed by time    --ALEXA Benitez NP on 2/11/2022 at 1:40 PM    An electronic signature was used to authenticate this note.

## 2022-02-15 ENCOUNTER — TELEPHONE (OUTPATIENT)
Dept: NEUROLOGY | Age: 42
End: 2022-02-15

## 2022-02-15 NOTE — TELEPHONE ENCOUNTER
Approved. i tried to call pt. but his mailbox was full. I did clal the pharmacy and left a message that it was approved so they can fill it.

## 2022-02-15 NOTE — TELEPHONE ENCOUNTER
Alexandro Ahn called in. He has not gotten the Vimpat that Dr. Vesna Vu prescribed on 1/19. Apparently it needs PA but the pharmacy was not gettng any answer back and he could not locate Dr. Vesna Vu.  he is seeing Dr. Shasta Patel in follow up. I spoke with pharmacist and it does require a pa.  I faxed it to [facility]

## 2022-02-17 NOTE — TELEPHONE ENCOUNTER
Pt was having issues getting his Vimpat, pharmacy was still saying it wasn't approved. I looked at the PA form sent into the [facility] and it was marked for BID but only for #30, insurance only approved #30/30 days. I called the insurance and spoke with Jahaira Weinberg. I explained the situation, she was able to discuss with the pharmacist and they were able to change the approved qty to #60/30. I called [facility] and informed Tawana, pharmacist. She was able to get the medication to go through.

## 2022-02-22 ENCOUNTER — TELEPHONE (OUTPATIENT)
Dept: FAMILY MEDICINE CLINIC | Age: 42
End: 2022-02-22

## 2022-02-22 NOTE — TELEPHONE ENCOUNTER
In the morning he feels like he has fluid on his lungs because he has a lot of mucus in the morning when he wakes up and has to cough it up. He denies any other symptoms.

## 2022-02-26 PROBLEM — Z09 HOSPITAL DISCHARGE FOLLOW-UP: Status: RESOLVED | Noted: 2022-01-27 | Resolved: 2022-02-26

## 2022-03-03 ENCOUNTER — NURSE TRIAGE (OUTPATIENT)
Dept: OTHER | Facility: CLINIC | Age: 42
End: 2022-03-03

## 2022-03-03 NOTE — TELEPHONE ENCOUNTER
Received call from Jose Hannah at [facility] with [employer]. Subjective: Caller states \"It's been going on for a long time now. When I wake up in the morning I've been having a lot of phlegm in my chest. I cough it up and stuff. \"     Current Symptoms: phlegm in chest-, cough, difficulty breathing at times in the morning when wakes up-NOT currently, breathing gets better after coughs thick, yellow phlegm up    No hx of lung condition    No hx of blood clot    Hx of 9 surgeries on leg- last in 2020    Had exposure to mold in home previously living in- 2 months ago    Onset: \"quite a while\"- several months    Associated Symptoms: NA    Pain Severity: Denies    Temperature: Denies    What has been tried: Mucinex- helping some    LMP: NA Pregnant: NA    Recommended disposition: See PCP within 3 Days. Writer did advise patient to be seen at [facility], [facility] or ED if unable to get appointment in office in the next 3 days. Care advice provided, patient verbalizes understanding; denies any other questions or concerns; instructed to call back for any new or worsening symptoms. Patient/Caller agrees with recommended disposition; writer provided warm transfer to [facility] at [facility] for appointment scheduling. Patient not on the line when went to transfer to [facility] will call patient back to schedule. Attention Provider: Thank you for allowing me to participate in the care of your patient. The patient was connected to triage in response to information provided to the ECC/PSC. Please do not respond through this encounter as the response is not directed to a shared pool.     Reason for Disposition   Cough has been present for > 3 weeks    Protocols used: COUGH - ACUTE PRODUCTIVE-ADULT-AH

## 2022-03-15 ENCOUNTER — TELEPHONE (OUTPATIENT)
Dept: FAMILY MEDICINE CLINIC | Age: 42
End: 2022-03-15

## 2022-03-15 NOTE — TELEPHONE ENCOUNTER
Can you put in this referral. Thanks. Rendering Text In Billing: The biopsy specimen was grossed and processed into a slide.

## 2022-03-23 ENCOUNTER — OFFICE VISIT (OUTPATIENT)
Dept: NEUROLOGY | Age: 42
End: 2022-03-23
Payer: MEDICAID

## 2022-03-23 VITALS
WEIGHT: 253.4 LBS | HEIGHT: 66 IN | BODY MASS INDEX: 40.72 KG/M2 | SYSTOLIC BLOOD PRESSURE: 126 MMHG | DIASTOLIC BLOOD PRESSURE: 81 MMHG | HEART RATE: 73 BPM

## 2022-03-23 DIAGNOSIS — G40.909 SEIZURE DISORDER (HCC): ICD-10-CM

## 2022-03-23 PROCEDURE — 99214 OFFICE O/P EST MOD 30 MIN: CPT | Performed by: PSYCHIATRY & NEUROLOGY
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
## 2022-03-29 ENCOUNTER — TELEPHONE (OUTPATIENT)
Dept: NEUROLOGY | Age: 42
End: 2022-03-29

## 2022-03-29 NOTE — TELEPHONE ENCOUNTER
Call placed to the patient to schedule the LTME Dr. Marcell Padron had ordered. Voicemail recording states that the mailbox is full.

## 2022-04-07 ENCOUNTER — OFFICE VISIT (OUTPATIENT)
Dept: PRIMARY CARE CLINIC | Age: 42
End: 2022-04-07
Payer: MEDICAID

## 2022-04-07 VITALS
DIASTOLIC BLOOD PRESSURE: 80 MMHG | BODY MASS INDEX: 41.59 KG/M2 | SYSTOLIC BLOOD PRESSURE: 118 MMHG | OXYGEN SATURATION: 97 % | RESPIRATION RATE: 13 BRPM | WEIGHT: 258.8 LBS | HEART RATE: 63 BPM | HEIGHT: 66 IN

## 2022-04-07 DIAGNOSIS — G40.909 SEIZURE DISORDER (HCC): ICD-10-CM

## 2022-04-07 DIAGNOSIS — L30.9 DERMATITIS: ICD-10-CM

## 2022-04-07 DIAGNOSIS — F41.9 ANXIETY AND DEPRESSION: Primary | ICD-10-CM

## 2022-04-07 DIAGNOSIS — F32.A ANXIETY AND DEPRESSION: Primary | ICD-10-CM

## 2022-04-07 PROCEDURE — 99204 OFFICE O/P NEW MOD 45 MIN: CPT | Performed by: NURSE PRACTITIONER

## 2022-04-07 RX ORDER — DIAPER,BRIEF,INFANT-TODD,DISP
EACH MISCELLANEOUS
Qty: 20 G | Refills: 0 | Status: SHIPPED | OUTPATIENT
Start: 2022-04-07 | End: 2022-04-14

## 2022-04-07 ASSESSMENT — ENCOUNTER SYMPTOMS
SHORTNESS OF BREATH: 0
BACK PAIN: 0
COUGH: 0
ABDOMINAL PAIN: 0

## 2022-04-07 ASSESSMENT — PATIENT HEALTH QUESTIONNAIRE - PHQ9
2. FEELING DOWN, DEPRESSED OR HOPELESS: 1
SUM OF ALL RESPONSES TO PHQ QUESTIONS 1-9: 2
SUM OF ALL RESPONSES TO PHQ QUESTIONS 1-9: 2
SUM OF ALL RESPONSES TO PHQ9 QUESTIONS 1 & 2: 2
SUM OF ALL RESPONSES TO PHQ QUESTIONS 1-9: 2
1. LITTLE INTEREST OR PLEASURE IN DOING THINGS: 1
SUM OF ALL RESPONSES TO PHQ QUESTIONS 1-9: 2
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
## 2022-04-07 NOTE — TELEPHONE ENCOUNTER
Patient calling for refill of Vimpat.       Medication active on med list yes      Date of last fill: 1/19/22  verified on 4/7/2022   verified by [facility] LPN      Date of last appointment 3/23/22    Next Visit Date:  7/14/2022

## 2022-04-08 RX ORDER — LACOSAMIDE 100 MG/1
100 TABLET ORAL 2 TIMES DAILY
Qty: 60 TABLET | Refills: 2 | Status: SHIPPED | OUTPATIENT
Start: 2022-04-08 | End: 2022-09-13

## 2022-04-26 ENCOUNTER — HOSPITAL ENCOUNTER (OUTPATIENT)
Age: 42
Setting detail: OBSERVATION
LOS: 1 days | Discharge: HOME | End: 2022-04-30
Attending: PSYCHIATRY & NEUROLOGY | Admitting: PSYCHIATRY & NEUROLOGY
Payer: MEDICAID

## 2022-04-26 PROBLEM — R56.9 SEIZURE-LIKE ACTIVITY (HCC): Status: ACTIVE | Noted: 2022-04-26

## 2022-04-26 PROCEDURE — G0378 HOSPITAL OBSERVATION PER HR: HCPCS

## 2022-04-26 PROCEDURE — 6370000000 HC RX 637 (ALT 250 FOR IP): Performed by: STUDENT IN AN ORGANIZED HEALTH CARE EDUCATION/TRAINING PROGRAM

## 2022-04-26 PROCEDURE — 2580000003 HC RX 258: Performed by: STUDENT IN AN ORGANIZED HEALTH CARE EDUCATION/TRAINING PROGRAM

## 2022-04-26 PROCEDURE — 96372 THER/PROPH/DIAG INJ SC/IM: CPT

## 2022-04-26 PROCEDURE — 6360000002 HC RX W HCPCS: Performed by: STUDENT IN AN ORGANIZED HEALTH CARE EDUCATION/TRAINING PROGRAM

## 2022-04-26 PROCEDURE — 95700 EEG CONT REC W/VID EEG TECH: CPT

## 2022-04-26 PROCEDURE — 95711 VEEG 2-12 HR UNMONITORED: CPT

## 2022-04-26 RX ORDER — SODIUM CHLORIDE 0.9 % (FLUSH) 0.9 %
5-40 SYRINGE (ML) INJECTION PRN
Status: DISCONTINUED | OUTPATIENT
Start: 2022-04-26 | End: 2022-04-30 | Stop reason: HOSPADM

## 2022-04-26 RX ORDER — LACOSAMIDE 100 MG/1
100 TABLET ORAL 2 TIMES DAILY
Status: DISCONTINUED | OUTPATIENT
Start: 2022-04-26 | End: 2022-04-27

## 2022-04-26 RX ORDER — SODIUM CHLORIDE 9 MG/ML
INJECTION, SOLUTION INTRAVENOUS PRN
Status: DISCONTINUED | OUTPATIENT
Start: 2022-04-26 | End: 2022-04-30 | Stop reason: HOSPADM

## 2022-04-26 RX ORDER — ENOXAPARIN SODIUM 100 MG/ML
40 INJECTION SUBCUTANEOUS DAILY
Status: DISCONTINUED | OUTPATIENT
Start: 2022-04-26 | End: 2022-04-30 | Stop reason: HOSPADM

## 2022-04-26 RX ORDER — ACETAMINOPHEN 650 MG/1
650 SUPPOSITORY RECTAL EVERY 6 HOURS PRN
Status: DISCONTINUED | OUTPATIENT
Start: 2022-04-26 | End: 2022-04-30 | Stop reason: HOSPADM

## 2022-04-26 RX ORDER — ONDANSETRON 4 MG/1
4 TABLET, ORALLY DISINTEGRATING ORAL EVERY 8 HOURS PRN
Status: DISCONTINUED | OUTPATIENT
Start: 2022-04-26 | End: 2022-04-30 | Stop reason: HOSPADM

## 2022-04-26 RX ORDER — SODIUM CHLORIDE 0.9 % (FLUSH) 0.9 %
5-40 SYRINGE (ML) INJECTION EVERY 12 HOURS SCHEDULED
Status: DISCONTINUED | OUTPATIENT
Start: 2022-04-26 | End: 2022-04-30 | Stop reason: HOSPADM

## 2022-04-26 RX ORDER — POLYETHYLENE GLYCOL 3350 17 G/17G
17 POWDER, FOR SOLUTION ORAL DAILY PRN
Status: DISCONTINUED | OUTPATIENT
Start: 2022-04-26 | End: 2022-04-30 | Stop reason: HOSPADM

## 2022-04-26 RX ORDER — LORAZEPAM 2 MG/ML
2 INJECTION INTRAMUSCULAR EVERY 4 HOURS PRN
Status: DISCONTINUED | OUTPATIENT
Start: 2022-04-26 | End: 2022-04-30 | Stop reason: HOSPADM

## 2022-04-26 RX ORDER — ACETAMINOPHEN 325 MG/1
650 TABLET ORAL EVERY 6 HOURS PRN
Status: DISCONTINUED | OUTPATIENT
Start: 2022-04-26 | End: 2022-04-30 | Stop reason: HOSPADM

## 2022-04-26 RX ORDER — ONDANSETRON 2 MG/ML
4 INJECTION INTRAMUSCULAR; INTRAVENOUS EVERY 6 HOURS PRN
Status: DISCONTINUED | OUTPATIENT
Start: 2022-04-26 | End: 2022-04-30 | Stop reason: HOSPADM

## 2022-04-26 RX ADMIN — LACOSAMIDE 100 MG: 100 TABLET, FILM COATED ORAL at 21:00

## 2022-04-26 RX ADMIN — SODIUM CHLORIDE, PRESERVATIVE FREE 10 ML: 5 INJECTION INTRAVENOUS at 21:00

## 2022-04-26 RX ADMIN — ENOXAPARIN SODIUM 40 MG: 100 INJECTION SUBCUTANEOUS at 16:55

## 2022-04-26 NOTE — PROGRESS NOTES
Pt arrived to unit and admitted to room. Connected to telemetry. Pt alert and oriented. Vital signs stable.
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
## 2022-04-26 NOTE — TELEPHONE ENCOUNTER
We received a call from [facility] stating that the room was available for the patient's LTME admission. Call placed to the patient to inform him of this, however VM is full. Second attempt made to speak with the patient and same message received.

## 2022-04-27 LAB
ABSOLUTE EOS #: 0.09 K/UL (ref 0–0.44)
ABSOLUTE IMMATURE GRANULOCYTE: <0.03 K/UL (ref 0–0.3)
ABSOLUTE LYMPH #: 1.89 K/UL (ref 1.1–3.7)
ABSOLUTE MONO #: 0.24 K/UL (ref 0.1–1.2)
ANION GAP SERPL CALCULATED.3IONS-SCNC: 6 MMOL/L (ref 9–17)
BASOPHILS # BLD: 1 % (ref 0–2)
BASOPHILS ABSOLUTE: 0.04 K/UL (ref 0–0.2)
BUN BLDV-MCNC: 12 MG/DL (ref 6–20)
CALCIUM SERPL-MCNC: 8.8 MG/DL (ref 8.6–10.4)
CHLORIDE BLD-SCNC: 103 MMOL/L (ref 98–107)
CO2: 27 MMOL/L (ref 20–31)
CREAT SERPL-MCNC: 0.86 MG/DL (ref 0.7–1.2)
EOSINOPHILS RELATIVE PERCENT: 2 % (ref 1–4)
GFR AFRICAN AMERICAN: >60 ML/MIN
GFR NON-AFRICAN AMERICAN: >60 ML/MIN
GFR SERPL CREATININE-BSD FRML MDRD: ABNORMAL ML/MIN/{1.73_M2}
GLUCOSE BLD-MCNC: 86 MG/DL (ref 70–99)
HCT VFR BLD CALC: 42.3 % (ref 40.7–50.3)
HEMOGLOBIN: 12.9 G/DL (ref 13–17)
IMMATURE GRANULOCYTES: 1 %
LYMPHOCYTES # BLD: 49 % (ref 24–43)
MCH RBC QN AUTO: 26.1 PG (ref 25.2–33.5)
MCHC RBC AUTO-ENTMCNC: 30.5 G/DL (ref 28.4–34.8)
MCV RBC AUTO: 85.5 FL (ref 82.6–102.9)
MONOCYTES # BLD: 6 % (ref 3–12)
NRBC AUTOMATED: 0 PER 100 WBC
PDW BLD-RTO: 13.3 % (ref 11.8–14.4)
PLATELET # BLD: 244 K/UL (ref 138–453)
PMV BLD AUTO: 11.4 FL (ref 8.1–13.5)
POTASSIUM SERPL-SCNC: 4.2 MMOL/L (ref 3.7–5.3)
RBC # BLD: 4.95 M/UL (ref 4.21–5.77)
SEG NEUTROPHILS: 41 % (ref 36–65)
SEGMENTED NEUTROPHILS ABSOLUTE COUNT: 1.58 K/UL (ref 1.5–8.1)
SODIUM BLD-SCNC: 136 MMOL/L (ref 135–144)
WBC # BLD: 3.9 K/UL (ref 3.5–11.3)

## 2022-04-27 PROCEDURE — 2580000003 HC RX 258: Performed by: STUDENT IN AN ORGANIZED HEALTH CARE EDUCATION/TRAINING PROGRAM

## 2022-04-27 PROCEDURE — 96372 THER/PROPH/DIAG INJ SC/IM: CPT

## 2022-04-27 PROCEDURE — 85025 COMPLETE CBC W/AUTO DIFF WBC: CPT

## 2022-04-27 PROCEDURE — G0378 HOSPITAL OBSERVATION PER HR: HCPCS

## 2022-04-27 PROCEDURE — 6360000002 HC RX W HCPCS: Performed by: STUDENT IN AN ORGANIZED HEALTH CARE EDUCATION/TRAINING PROGRAM

## 2022-04-27 PROCEDURE — 94761 N-INVAS EAR/PLS OXIMETRY MLT: CPT

## 2022-04-27 PROCEDURE — 99219 PR INITIAL OBSERVATION CARE/DAY 50 MINUTES: CPT | Performed by: PSYCHIATRY & NEUROLOGY

## 2022-04-27 PROCEDURE — 95720 EEG PHY/QHP EA INCR W/VEEG: CPT | Performed by: PSYCHIATRY & NEUROLOGY

## 2022-04-27 PROCEDURE — 6370000000 HC RX 637 (ALT 250 FOR IP): Performed by: STUDENT IN AN ORGANIZED HEALTH CARE EDUCATION/TRAINING PROGRAM

## 2022-04-27 PROCEDURE — 95714 VEEG EA 12-26 HR UNMNTR: CPT

## 2022-04-27 PROCEDURE — 80048 BASIC METABOLIC PNL TOTAL CA: CPT

## 2022-04-27 RX ORDER — LACOSAMIDE 50 MG/1
50 TABLET ORAL 2 TIMES DAILY
Status: DISCONTINUED | OUTPATIENT
Start: 2022-04-27 | End: 2022-04-30 | Stop reason: HOSPADM

## 2022-04-27 RX ADMIN — LACOSAMIDE 50 MG: 50 TABLET, FILM COATED ORAL at 21:53

## 2022-04-27 RX ADMIN — ENOXAPARIN SODIUM 40 MG: 100 INJECTION SUBCUTANEOUS at 10:15

## 2022-04-27 RX ADMIN — LACOSAMIDE 100 MG: 100 TABLET, FILM COATED ORAL at 10:14

## 2022-04-27 RX ADMIN — SODIUM CHLORIDE, PRESERVATIVE FREE 10 ML: 5 INJECTION INTRAVENOUS at 21:54

## 2022-04-27 RX ADMIN — ACETAMINOPHEN 650 MG: 325 TABLET ORAL at 18:10

## 2022-04-27 RX ADMIN — SODIUM CHLORIDE, PRESERVATIVE FREE 10 ML: 5 INJECTION INTRAVENOUS at 10:14

## 2022-04-27 ASSESSMENT — PAIN DESCRIPTION - ORIENTATION: ORIENTATION: LEFT

## 2022-04-27 ASSESSMENT — PAIN SCALES - GENERAL: PAINLEVEL_OUTOF10: 9

## 2022-04-27 ASSESSMENT — PAIN DESCRIPTION - LOCATION: LOCATION: FOOT

## 2022-04-27 NOTE — CARE COORDINATION
Case Management Initial Discharge Plan  Kelechi Valenzuela,             Met with:patient to discuss discharge plans. Information verified: address, contacts, phone number, , insurance Yes  Insurance Provider: Medicaid    Emergency Contact/Next of Kin name & number: spouse Rosaura Chacon [phone]  Who are involved in patient's support system? family    PCP: ALEXA Rothman CNP  Date of last visit: 2 weeks ago      Discharge Planning    Living Arrangements:   apartment with spouse     Home has multiple stories/apartment  2 stairs to climb to get into front door, 3 flights stairs to climb to reach second floor  Location of bedroom/bathroom 3rd floor, main level of apt    Patient able to perform ADL's:Independent    Current Services (outpatient & in home) none  DME equipment: cane  DME provider: n/a    Is patient receiving oral anticoagulation therapy? No    If indicated:   Physician managing anticoagulation treatment: n/a  Where does patient obtain lab work for ATC treatment? n/a    Does patient have any issues/concerns obtaining medications? No  If yes, what are patient's concerns? Is there a preferred Pharmacy after hours or on weekends? No    If yes, which pharmacy? Potential Assistance Needed:       Patient agreeable to home care: No  [place] of choice provided:  n/a    Prior SNF/Rehab Placement and Facility: yes  Agreeable to SNF/Rehab: No  [place] of choice provided: n/a     Evaluation: n/a    Expected Discharge date:       Patient expects to be discharged to:        If home: is the family and/or caregiver wiling & able to provide support at home? yes  Who will be providing this support? family    Follow Up Appointment: Best Day/ Time:      Transportation provider: family  Transportation arrangements needed for discharge: No    Readmission Risk              Risk of Unplanned Readmission:  11             Does patient have a readmission risk score greater than 14?: No  If yes, follow-up appointment must be made within 7 days of discharge.      Goals of Care: safety      Educated patient on transitional options, provided freedom of choice and are agreeable with plan      Discharge Plan: home with family, no needs          Electronically signed by Dae Mares RN on 4/27/22 at 3:45 PM EDT

## 2022-04-27 NOTE — PLAN OF CARE
Problem: Discharge Planning  Goal: Discharge to home or other facility with appropriate resources  4/26/2022 2148 by Rudi Jeewll RN  Outcome: Progressing  4/26/2022 1912 by Demetrius Mendoza RN  Outcome: Progressing     Problem: Safety - Adult  Goal: Free from fall injury  4/26/2022 2148 by Rudi Jewell RN  Outcome: Progressing  4/26/2022 1912 by Demetrius Mendoza RN  Outcome: Progressing

## 2022-04-27 NOTE — PROCEDURES
Referring physician: Dr. Heydi Shipley  Date: 4/27/2022  Start Time: 4/26/2022 @1640  End Time: 4/27/2022 @ 1640    Indication  Patient with seizures. Introduction  This continuous video-EEG was acquired using a [employer] workstation at 256 samples/s. Electrodes were placed according to the International 10-20 system. Automated spike and seizure detection algorithms were applied. Video was recorded during this study. Description  During the maximal alert state, a well-regulated 9 Hz posterior dominant rhythm was seen which was symmetrical and attenuated to eye opening. No consistent focal slowing or interhemispheric asymmetry was noted. Normal sleep structures were observed. There were no interictal epileptiform discharges or electrographic seizures. Events  No events reported or recorded. Impression  Normal continuous video-EEG. EKG lead did not show clear arrhythmia, if still in concern consider formal EKG or correlation with telemetry. No epileptiform discharges were identified. Please note the absence of   such activity in this record cannot conclusively rule out an epileptic   disorder. If such is still clinically suspected, a repeat study with   prolonged sampling may be helpful. Gurwinder Nam MD  Epilepsy Board Certified. Neurology Board Certified.     Electronically Signed

## 2022-04-27 NOTE — FLOWSHEET NOTE
SPIRITUAL CARE DEPARTMENT - [facility]  PROGRESS NOTE    Shift date: 4/27/22  Shift day: Wednesday   Shift # 2    Room # 0502/0502-01   Name: Tyrel Hyatt            Age: 37 y.o. Gender: male          [facility]: None  Place of Adventism: Unknown    Referral: Routine Visit    Admit Date & Time: 4/26/2022  3:40 PM    PATIENT/EVENT DESCRIPTION:  Tyrel Hyatt is a 37 y.o. male in room 502 of . Patient is experiencing seizures and that is why he was admitted. Patient has been diagnosed with seizure disorder. SPIRITUAL ASSESSMENT/INTERVENTION:   rounded on 5A and met and conversed with patient. The patient communicated how he has received seizures. Medical is assessing and running tests to see what is wrong. Patient was grateful for the visit and speaking with him.  fostered hope and brought spiritual encouragement to the patient. Patient appears to be in good spirits.  dropped off prayers cards: prayer for the sick and prayer for healing to the patient.  also concluded the encounter with a closing prayer. SPIRITUAL CARE FOLLOW-UP PLAN:  Chaplains will remain available to offer spiritual and emotional support as needed. Electronically signed by Geraldine Parrish, on 4/27/2022 at 6:08 PM.  [facility]  [phone]       04/27/22 9616   Encounter Summary   Encounter Overview/Reason  Initial Encounter   Service Provided For: Patient   Referral/Consult From: [employer] Other (Comment)   Last Encounter  04/27/22   Complexity of Encounter Moderate   Begin Time 1736   End Time  1746   Total Time Calculated 10 min   Encounter    Type Initial Screen/Assessment   Assessment/Intervention/Outcome   Assessment Calm;Coping; Hopeful   Intervention Active listening;Nurtured Hope;Prayer (assurance of)/[place];Sustaining Presence/Ministry of presence   Outcome Acceptance;Comfort;Engaged in conversation;Peace; Receptive; Less anxious, Less agitated;Venting emotion   Plan and Referrals   Plan/Referrals No future visits requested

## 2022-04-27 NOTE — PLAN OF CARE
Problem: Discharge Planning  Goal: Discharge to home or other facility with appropriate resources  4/27/2022 1040 by Zaina Sky RN  Outcome: Progressing     Problem: Safety - Adult  Goal: Free from fall injury  4/27/2022 1040 by Zaina Sky RN  Outcome: Progressing

## 2022-04-28 PROCEDURE — 95714 VEEG EA 12-26 HR UNMNTR: CPT

## 2022-04-28 PROCEDURE — 6360000002 HC RX W HCPCS: Performed by: STUDENT IN AN ORGANIZED HEALTH CARE EDUCATION/TRAINING PROGRAM

## 2022-04-28 PROCEDURE — 6370000000 HC RX 637 (ALT 250 FOR IP): Performed by: STUDENT IN AN ORGANIZED HEALTH CARE EDUCATION/TRAINING PROGRAM

## 2022-04-28 PROCEDURE — 96372 THER/PROPH/DIAG INJ SC/IM: CPT

## 2022-04-28 PROCEDURE — 2580000003 HC RX 258: Performed by: STUDENT IN AN ORGANIZED HEALTH CARE EDUCATION/TRAINING PROGRAM

## 2022-04-28 PROCEDURE — G0378 HOSPITAL OBSERVATION PER HR: HCPCS

## 2022-04-28 PROCEDURE — 94761 N-INVAS EAR/PLS OXIMETRY MLT: CPT

## 2022-04-28 PROCEDURE — 99225 PR SBSQ OBSERVATION CARE/DAY 25 MINUTES: CPT | Performed by: PSYCHIATRY & NEUROLOGY

## 2022-04-28 PROCEDURE — 95720 EEG PHY/QHP EA INCR W/VEEG: CPT | Performed by: PSYCHIATRY & NEUROLOGY

## 2022-04-28 RX ORDER — CHOLECALCIFEROL (VITAMIN D3) 125 MCG
5 CAPSULE ORAL NIGHTLY PRN
Status: DISCONTINUED | OUTPATIENT
Start: 2022-04-28 | End: 2022-04-30 | Stop reason: HOSPADM

## 2022-04-28 RX ADMIN — SODIUM CHLORIDE, PRESERVATIVE FREE 10 ML: 5 INJECTION INTRAVENOUS at 21:07

## 2022-04-28 RX ADMIN — ACETAMINOPHEN 650 MG: 325 TABLET ORAL at 08:04

## 2022-04-28 RX ADMIN — Medication 5 MG: at 23:23

## 2022-04-28 RX ADMIN — ACETAMINOPHEN 650 MG: 325 TABLET ORAL at 21:10

## 2022-04-28 RX ADMIN — LACOSAMIDE 50 MG: 50 TABLET, FILM COATED ORAL at 08:04

## 2022-04-28 RX ADMIN — ENOXAPARIN SODIUM 40 MG: 100 INJECTION SUBCUTANEOUS at 08:04

## 2022-04-28 RX ADMIN — SODIUM CHLORIDE, PRESERVATIVE FREE 10 ML: 5 INJECTION INTRAVENOUS at 08:04

## 2022-04-28 ASSESSMENT — PAIN DESCRIPTION - LOCATION
LOCATION: HEAD
LOCATION: LEG
LOCATION: HEAD
LOCATION_2: LEG

## 2022-04-28 ASSESSMENT — PAIN DESCRIPTION - ORIENTATION
ORIENTATION: LEFT
ORIENTATION_2: LEFT
ORIENTATION: ANTERIOR

## 2022-04-28 ASSESSMENT — PAIN DESCRIPTION - DESCRIPTORS
DESCRIPTORS: THROBBING
DESCRIPTORS: ACHING

## 2022-04-28 ASSESSMENT — PAIN SCALES - GENERAL
PAINLEVEL_OUTOF10: 0
PAINLEVEL_OUTOF10: 8
PAINLEVEL_OUTOF10: 4
PAINLEVEL_OUTOF10: 8
PAINLEVEL_OUTOF10: 8

## 2022-04-28 ASSESSMENT — PAIN DESCRIPTION - INTENSITY: RATING_2: 8

## 2022-04-28 NOTE — PROCEDURES
Referring physician: Dr. Garcia Nino  Date: 4/28/2022  Start Time: 4/27/2022 @1640  End Time: 4/28/2022 @ 1640    Indication  Patient with seizures. Introduction  This continuous video-EEG was acquired using a [employer] workstation at 256 samples/s. Electrodes were placed according to the International 10-20 system. Automated spike and seizure detection algorithms were applied. Video was recorded during this study. Description  During the maximal alert state, a well-regulated 9 Hz posterior dominant rhythm was seen which was symmetrical and attenuated to eye opening. No consistent focal slowing or interhemispheric asymmetry was noted. Normal sleep structures were observed. There were no interictal epileptiform discharges or electrographic seizures. Events  No events reported or recorded. Impression  Normal continuous video-EEG. EKG lead did not show clear arrhythmia, if still in concern consider formal EKG or correlation with telemetry. No epileptiform discharges were identified. Please note the absence of   such activity in this record cannot conclusively rule out an epileptic   disorder. If such is still clinically suspected, a repeat study with   prolonged sampling may be helpful. Orlando Delatorre MD  Epilepsy Board Certified. Neurology Board Certified.     Electronically Signed
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
## 2022-04-29 PROCEDURE — 99225 PR SBSQ OBSERVATION CARE/DAY 25 MINUTES: CPT | Performed by: PSYCHIATRY & NEUROLOGY

## 2022-04-29 PROCEDURE — 6370000000 HC RX 637 (ALT 250 FOR IP): Performed by: STUDENT IN AN ORGANIZED HEALTH CARE EDUCATION/TRAINING PROGRAM

## 2022-04-29 PROCEDURE — G0378 HOSPITAL OBSERVATION PER HR: HCPCS

## 2022-04-29 PROCEDURE — 96372 THER/PROPH/DIAG INJ SC/IM: CPT

## 2022-04-29 PROCEDURE — 6360000002 HC RX W HCPCS: Performed by: STUDENT IN AN ORGANIZED HEALTH CARE EDUCATION/TRAINING PROGRAM

## 2022-04-29 PROCEDURE — 95720 EEG PHY/QHP EA INCR W/VEEG: CPT | Performed by: PSYCHIATRY & NEUROLOGY

## 2022-04-29 PROCEDURE — 95714 VEEG EA 12-26 HR UNMNTR: CPT

## 2022-04-29 PROCEDURE — 2580000003 HC RX 258: Performed by: STUDENT IN AN ORGANIZED HEALTH CARE EDUCATION/TRAINING PROGRAM

## 2022-04-29 RX ADMIN — ACETAMINOPHEN 650 MG: 325 TABLET ORAL at 15:44

## 2022-04-29 RX ADMIN — ENOXAPARIN SODIUM 40 MG: 100 INJECTION SUBCUTANEOUS at 08:27

## 2022-04-29 RX ADMIN — SODIUM CHLORIDE, PRESERVATIVE FREE 10 ML: 5 INJECTION INTRAVENOUS at 08:28

## 2022-04-29 RX ADMIN — ACETAMINOPHEN 650 MG: 325 TABLET ORAL at 21:57

## 2022-04-29 RX ADMIN — Medication 5 MG: at 21:57

## 2022-04-29 RX ADMIN — SODIUM CHLORIDE, PRESERVATIVE FREE 10 ML: 5 INJECTION INTRAVENOUS at 20:51

## 2022-04-29 ASSESSMENT — PAIN SCALES - GENERAL
PAINLEVEL_OUTOF10: 8
PAINLEVEL_OUTOF10: 8
PAINLEVEL_OUTOF10: 4
PAINLEVEL_OUTOF10: 10

## 2022-04-29 ASSESSMENT — PAIN DESCRIPTION - PAIN TYPE: TYPE: ACUTE PAIN

## 2022-04-29 ASSESSMENT — PAIN DESCRIPTION - ORIENTATION
ORIENTATION: LEFT
ORIENTATION: LEFT

## 2022-04-29 ASSESSMENT — PAIN DESCRIPTION - DESCRIPTORS
DESCRIPTORS: ACHING

## 2022-04-29 ASSESSMENT — PAIN DESCRIPTION - LOCATION
LOCATION: HEAD
LOCATION_2: HEAD
LOCATION: LEG
LOCATION: LEG

## 2022-04-29 ASSESSMENT — PAIN DESCRIPTION - INTENSITY: RATING_2: 8

## 2022-04-29 ASSESSMENT — PAIN DESCRIPTION - FREQUENCY: FREQUENCY: CONTINUOUS

## 2022-04-29 ASSESSMENT — PAIN DESCRIPTION - ONSET: ONSET: ON-GOING

## 2022-04-29 NOTE — PROCEDURES
Referring physician: Dr. Galaviz [place]  Date: 4/29/2022  Start Time: 4/28/2022 @1640  End Time: 4/29/2022 @ 1640    Indication  Patient with seizures. Introduction  This continuous video-EEG was acquired using a [employer] workstation at 256 samples/s. Electrodes were placed according to the International 10-20 system. Automated spike and seizure detection algorithms were applied. Video was recorded during this study. Description  During the maximal alert state, a well-regulated 9 Hz posterior dominant rhythm was seen which was symmetrical and attenuated to eye opening. No consistent focal slowing or interhemispheric asymmetry was noted. Normal sleep structures were observed. There were no interictal epileptiform discharges or electrographic seizures. Events  No events reported or recorded. Impression  Normal continuous video-EEG. EKG lead did not show clear arrhythmia, if still in concern consider formal EKG or correlation with telemetry. No epileptiform discharges were identified. Please note the absence of   such activity in this record cannot conclusively rule out an epileptic   disorder. If such is still clinically suspected, a repeat study with   prolonged sampling may be helpful. Mario Baires MD  Epilepsy Board Certified. Neurology Board Certified.     Electronically Signed

## 2022-04-29 NOTE — PLAN OF CARE
Problem: Discharge Planning  Goal: Discharge to home or other facility with appropriate resources  Outcome: Progressing     Problem: Safety - Adult  Goal: Free from fall injury  Outcome: Progressing     Problem: Neurosensory - Adult  Goal: Achieves stable or improved neurological status  Outcome: Progressing  Goal: Absence of seizures  Outcome: Progressing  Goal: Remains free of injury related to seizures activity  Outcome: Progressing  Goal: Achieves maximal functionality and self care  Outcome: Progressing     Problem: Pain  Goal: Verbalizes/displays adequate comfort level or baseline comfort level  Outcome: Progressing
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
## 2022-04-30 VITALS
DIASTOLIC BLOOD PRESSURE: 70 MMHG | HEIGHT: 66 IN | RESPIRATION RATE: 18 BRPM | OXYGEN SATURATION: 98 % | BODY MASS INDEX: 41.5 KG/M2 | HEART RATE: 72 BPM | WEIGHT: 258.2 LBS | TEMPERATURE: 98.1 F | SYSTOLIC BLOOD PRESSURE: 114 MMHG

## 2022-04-30 PROCEDURE — 6370000000 HC RX 637 (ALT 250 FOR IP): Performed by: STUDENT IN AN ORGANIZED HEALTH CARE EDUCATION/TRAINING PROGRAM

## 2022-04-30 PROCEDURE — 95720 EEG PHY/QHP EA INCR W/VEEG: CPT | Performed by: PSYCHIATRY & NEUROLOGY

## 2022-04-30 PROCEDURE — 95711 VEEG 2-12 HR UNMONITORED: CPT

## 2022-04-30 PROCEDURE — G0378 HOSPITAL OBSERVATION PER HR: HCPCS

## 2022-04-30 RX ADMIN — ACETAMINOPHEN 650 MG: 325 TABLET ORAL at 11:44

## 2022-04-30 ASSESSMENT — ENCOUNTER SYMPTOMS
SHORTNESS OF BREATH: 0
VOICE CHANGE: 0
WHEEZING: 0
ABDOMINAL PAIN: 0
PHOTOPHOBIA: 0
TROUBLE SWALLOWING: 0

## 2022-04-30 ASSESSMENT — PAIN DESCRIPTION - LOCATION: LOCATION: HEAD

## 2022-04-30 ASSESSMENT — PAIN SCALES - GENERAL: PAINLEVEL_OUTOF10: 8

## 2022-04-30 ASSESSMENT — PAIN DESCRIPTION - DESCRIPTORS: DESCRIPTORS: ACHING

## 2022-04-30 ASSESSMENT — PAIN DESCRIPTION - ORIENTATION: ORIENTATION: MID

## 2022-04-30 NOTE — PROCEDURES
Referring physician: Dr. Toribio Cárdenas  Date: 4/30/2022  Start Time: 4/29/2022 @1640  End Time: 4/30/2022 @ 1250    Indication  Patient with seizures. Introduction  This continuous video-EEG was acquired using a [employer] workstation at 256 samples/s. Electrodes were placed according to the International 10-20 system. Automated spike and seizure detection algorithms were applied. Video was recorded during this study. Description  During the maximal alert state, a well-regulated 9 Hz posterior dominant rhythm was seen which was symmetrical and attenuated to eye opening. No consistent focal slowing or interhemispheric asymmetry was noted. Normal sleep structures were observed. There were no interictal epileptiform discharges or electrographic seizures. Events  No events reported or recorded. Impression  Normal continuous video-EEG. EKG lead did not show clear arrhythmia, if still in concern consider formal EKG or correlation with telemetry. No epileptiform discharges were identified. Please note the absence of   such activity in this record cannot conclusively rule out an epileptic   disorder. If such is still clinically suspected, a repeat study with   prolonged sampling may be helpful. Dionicio De La O MD  Epilepsy Board Certified. Neurology Board Certified.     Electronically Signed

## 2022-04-30 NOTE — PLAN OF CARE
Problem: Discharge Planning  Goal: Discharge to home or other facility with appropriate resources  4/30/2022 1349 by Anish Bey RN  Outcome: Completed  4/30/2022 0709 by Anish Bey, RN  Outcome: Progressing

## 2022-04-30 NOTE — FLOWSHEET NOTE
Pt instructed to wait for me to finish discharge instructions . IV discontinued. When I returned to room, pt had left on own, so no discharge instructions were given.

## 2022-04-30 NOTE — DISCHARGE SUMMARY
[facility]     Department of Neurology    INPATIENT DISCHARGE SUMMARY        Patient Identification:  Ghislaine Chery is a 37 y.o. male. :  1980  MRN: [id number]     Acct: [de-identified]   Admit Date:  2022  Discharge date and time: 2022  2:13 PM   Attending Provider: No att. providers found                                     Admission Diagnoses:   Seizure disorder ([facility] [G40.909]  Se[facility] ([facility] [R56.9]    Discharge Diagnoses:   Principal Problem:    Seizure disorder ([facility]  Active Problems:    Seizure-like activity ([facility]  Resolved Problems:    * No resolved hospital problems. *       Consults:   none    Brief Inpatient course: The patient is a 37 y.o. male with significant past medical history of absence seizures, daily eyelid myoclonia, who is admitted for elective LTME monitoring. Per his neurologists office note. Prior to admission patient was on vimpat 100mg bid and was slowly taken off to capture episodes. -: LTM EEG unremarkable for any ictal or interictal activity. Continue to taper down Vimpat and stopped since . Plan to discharge tomorrow if LTM is unremarkable. AAO x 3 with no deficits. Patient was updated about LTME results and we agreed upon resuming meds with outpatient f/u in clinic. Plan to dc today. Patient is stable from neurological standpoint to be discharged. Procedures:  LTME    Any Hospital Acquired Infections: none    Discharge Functional Status:  stable    Disposition: home    Patient Instructions:   No driving for at least 6 months.   No heavy lifting for at least 6 months  No bathing or swimming unsupervised  Avoid locking doors, it prevents some to help you if needed  Avoid stairs unsupervised  Avoid cooking unsupervised      Discharge Medications:  Current Discharge Medication List      CONTINUE these medications which have NOT CHANGED    Details   lacosamide (VIMPAT) 100 MG TABS tablet Take 1 tablet by mouth 2 times daily for 90 days. Qty: 60 tablet, Refills: 2    Associated Diagnoses: Seizure disorder (HCC)      acetaminophen (TYLENOL) 500 MG tablet Take 1,000 mg by mouth every 6 hours as needed             Activity: No driving for at least 6 months. No heavy lifting for at least 6 months  No bathing or swimming unsupervised  Avoid locking doors, it prevents some to help you if needed  Avoid stairs unsupervised  Avoid cooking unsupervised      Restrictions: No driving for at least 6 months. No heavy lifting for at least 6 months  No bathing or swimming unsupervised  Avoid locking doors, it prevents some to help you if needed  Avoid stairs unsupervised  Avoid cooking unsupervised      Diet: regular diet and low fat, low cholesterol diet    Follow-up:    Jennifer Fontenot MD  [place] [address]  [phone]    In 4 weeks  Post hospital follow up. LTME negative. Vimpat resumed and discharged. medication adjustment in clinic.        Follow up labs: none    Follow up imaging: none    Note that over 30 minutes was spent in preparing discharge papers, discussing discharge with patient, medication review, etc.      Meme Golden MD,  Department of Neurology  [address]  5/3/2022, 8:51 AM

## 2022-04-30 NOTE — PLAN OF CARE
Problem: Discharge Planning  Goal: Discharge to home or other facility with appropriate resources  4/30/2022 1349 by Hiram Jo RN  Outcome: Completed  4/30/2022 0709 by Hiram Jo RN  Outcome: Progressing

## 2022-04-30 NOTE — DISCHARGE INSTR - DIET
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
## 2022-05-03 ENCOUNTER — TELEPHONE (OUTPATIENT)
Dept: PRIMARY CARE CLINIC | Age: 42
End: 2022-05-03

## 2022-05-03 NOTE — TELEPHONE ENCOUNTER
[facility] Initial Follow Up Call    Outreach made within 2 business days of discharge: Yes    Patient: Kelley Yuen Patient : 1980   MRN: [id number]  Reason for Admission: There are no discharge diagnoses documented for the most recent discharge. Discharge Date: 22       Spoke with: left message    Discharge department/facility: [place] [address] Interactive Patient Contact:  Called and left message asking patient to contact the office to schedule f/u appointment.     Scheduled appointment with PCP within 7-14 days    Follow Up  Future Appointments   Date Time Provider Clarissa Noguera   2022 10:40 AM Reji Beckman MD Neuro Spec [id number]   10/7/2022  1:40 PM ALEXA Cote - CNP [facility]       Niall Alex MA

## 2022-05-06 DIAGNOSIS — G57.72 COMPLEX REGIONAL PAIN SYNDROME TYPE 2 OF LEFT LOWER EXTREMITY: Primary | ICD-10-CM

## 2022-05-17 PROCEDURE — 99217 PR OBSERVATION CARE DISCHARGE MANAGEMENT: CPT | Performed by: PSYCHIATRY & NEUROLOGY

## 2022-07-11 ENCOUNTER — TELEPHONE (OUTPATIENT)
Dept: NEUROLOGY | Age: 42
End: 2022-07-11

## 2022-07-11 DIAGNOSIS — F41.9 ANXIETY AND DEPRESSION: Primary | ICD-10-CM

## 2022-07-11 DIAGNOSIS — F32.A ANXIETY AND DEPRESSION: Primary | ICD-10-CM

## 2022-07-11 NOTE — TELEPHONE ENCOUNTER
Since the Vimpat is working, I hate to change the medication. If he is agreeable.   I will prescribe 50 mg of trazodone at bedtime daily to help him sleep

## 2022-07-11 NOTE — TELEPHONE ENCOUNTER
Liz Dasilva called the office and stated that he has been taking Vimpat 100 mg twice per day for the past couple of months and it has been working for him, he does however state that it has affected his sleep, he has trouble falling asleep and staying sleep. He is only ever able to sleep for about an hour at a time and this has caused high anxiety as well. Is there anything he is able to try, to help with these symptoms? Please address. Thank you.

## 2022-07-12 RX ORDER — TRAZODONE HYDROCHLORIDE 50 MG/1
50 TABLET ORAL NIGHTLY PRN
Qty: 30 TABLET | Refills: 5 | Status: SHIPPED | OUTPATIENT
Start: 2022-07-12 | End: 2022-09-23

## 2022-07-12 RX ORDER — TRAZODONE HYDROCHLORIDE 50 MG/1
50 TABLET ORAL NIGHTLY
COMMUNITY
End: 2022-07-13 | Stop reason: SDUPTHER

## 2022-07-12 RX ORDER — TRAZODONE HYDROCHLORIDE 50 MG/1
50 TABLET ORAL NIGHTLY
Qty: 30 TABLET | Refills: 0 | Status: CANCELLED | OUTPATIENT
Start: 2022-07-12 | End: 2022-08-11

## 2022-07-12 NOTE — TELEPHONE ENCOUNTER
I called and relayed previously stated information to Rabia Mahmood, he is agreeable to starting trazodone. He asked that the prescription be sent to the [facility].      Prescription attached, please review and send,

## 2022-07-18 NOTE — TELEPHONE ENCOUNTER
Aaron Condon called the office and stated that the Trazodone that was recently prescribed to him has been helping him for sleep but upon waking he is still having difficulty with bad anxiety. He would like to know if something can be given to specifically help him with that. Please address.

## 2022-08-16 ENCOUNTER — TELEPHONE (OUTPATIENT)
Dept: BEHAVIORAL/MENTAL HEALTH CLINIC | Age: 42
End: 2022-08-16

## 2022-09-12 DIAGNOSIS — G40.909 SEIZURE DISORDER (HCC): ICD-10-CM

## 2022-09-13 RX ORDER — LACOSAMIDE 100 MG/1
TABLET ORAL
Qty: 60 TABLET | Refills: 3 | Status: SHIPPED | OUTPATIENT
Start: 2022-09-13 | End: 2023-01-11

## 2022-09-13 NOTE — TELEPHONE ENCOUNTER
Pharmacy requesting refill of Vimpat.       Medication active on med list yes      Date of last fill: 4/8/22 for #60 and 2 refills  verified on 9/13/2022    verified by Franki Kidd., TREVOR      Date of last appointment: 3/23/2022    Next Visit Date:  12/15/2022

## 2022-09-14 ENCOUNTER — TELEMEDICINE (OUTPATIENT)
Dept: BEHAVIORAL/MENTAL HEALTH CLINIC | Age: 42
End: 2022-09-14
Payer: MEDICAID

## 2022-09-14 DIAGNOSIS — F33.1 MODERATE EPISODE OF RECURRENT MAJOR DEPRESSIVE DISORDER (HCC): Primary | ICD-10-CM

## 2022-09-14 DIAGNOSIS — F41.1 GENERALIZED ANXIETY DISORDER: ICD-10-CM

## 2022-09-14 PROCEDURE — 90791 PSYCH DIAGNOSTIC EVALUATION: CPT | Performed by: SOCIAL WORKER

## 2022-09-19 ENCOUNTER — TELEPHONE (OUTPATIENT)
Dept: NEUROLOGY | Age: 42
End: 2022-09-19

## 2022-09-19 DIAGNOSIS — G40.909 SEIZURE DISORDER (HCC): ICD-10-CM

## 2022-09-19 RX ORDER — LACOSAMIDE 100 MG/1
TABLET ORAL
Qty: 60 TABLET | OUTPATIENT
Start: 2022-09-19 | End: 2023-01-17

## 2022-09-20 NOTE — TELEPHONE ENCOUNTER
I would strongly recommend the patient to follow-up with his psychiatrist for medications for anxiety. The patient believes he is in danger to himself or suicidal, he should seek immediate medical attention.   Thank you

## 2022-09-21 ENCOUNTER — TELEPHONE (OUTPATIENT)
Dept: PRIMARY CARE CLINIC | Age: 42
End: 2022-09-21

## 2022-09-21 NOTE — TELEPHONE ENCOUNTER
I called and spoke with Li Alves. He said that the person he had a virtual visit with doesn't prescribe medications but said they could provide him with a referral.  He was supposed to be speaking with them again this week or next. I encouraged him to call [facility] his PCP to discuss with them. I also offered to give him numbers for [facility] or another psychiatrist and also offered the ER if he is severely depressed or having suicidal thoughts. He said he will call Cj sandoval.

## 2022-09-21 NOTE — TELEPHONE ENCOUNTER
Patient states that he has been dealing with high levels of anxiety which is causing him to have trouble sleeping at night. Patient reports also having chest heaviness due to the anxiety and low sleep obtained. Patient saw psych but they are unable to prescribe him any meds at this time. Patient is wondering if PCP could prescribe a medication to help his anxiety to help him sleep at night or if he would need to be seen in office.  Please advise

## 2022-09-22 NOTE — TELEPHONE ENCOUNTER
Per [facility] [address]Micangélica Juarez appt can be telephone\"    Scheduled patient for telephone visit on 9/23/2022 @8:30am

## 2022-09-23 ENCOUNTER — SCHEDULED TELEPHONE ENCOUNTER (OUTPATIENT)
Dept: PRIMARY CARE CLINIC | Age: 42
End: 2022-09-23
Payer: MEDICAID

## 2022-09-23 DIAGNOSIS — F32.A ANXIETY AND DEPRESSION: Primary | ICD-10-CM

## 2022-09-23 DIAGNOSIS — F51.01 PRIMARY INSOMNIA: ICD-10-CM

## 2022-09-23 DIAGNOSIS — F41.9 ANXIETY AND DEPRESSION: Primary | ICD-10-CM

## 2022-09-23 PROCEDURE — 98967 PH1 ASSMT&MGMT NQHP 11-20: CPT | Performed by: NURSE PRACTITIONER

## 2022-09-23 RX ORDER — AMITRIPTYLINE HYDROCHLORIDE 50 MG/1
50 TABLET, FILM COATED ORAL NIGHTLY
Qty: 30 TABLET | Refills: 0 | Status: SHIPPED | OUTPATIENT
Start: 2022-09-23 | End: 2022-09-28
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
## 2022-09-23 NOTE — PROGRESS NOTES
Presents via telephone for anxiety/depression and insomnia and PTSD    Not sleeping well at night, approx 2 hr per night  Feels related to anxiety and feelings of PTSD  Meeting with counselor regularly and does find this helpful  Trial of trazodone per neurology, states this made his symptoms worse and he stopped using the med  Has tried OTC sleep aids without relief  Requesting alternate sleep aid today  Discussed use of elavil/risks and benefits  Willing to trial med    Denies any seizure activity since LOV    Denies any other problems/concerns    This visit occurred via telephone, he consented prior  He was at his home and I was in the office  The visit lasted 11 minutes

## 2022-09-28 ENCOUNTER — TELEMEDICINE (OUTPATIENT)
Dept: PRIMARY CARE CLINIC | Age: 42
End: 2022-09-28
Payer: MEDICAID

## 2022-09-28 ENCOUNTER — TELEMEDICINE (OUTPATIENT)
Dept: BEHAVIORAL/MENTAL HEALTH CLINIC | Age: 42
End: 2022-09-28
Payer: MEDICAID

## 2022-09-28 DIAGNOSIS — F41.9 ANXIETY AND DEPRESSION: ICD-10-CM

## 2022-09-28 DIAGNOSIS — F51.01 PRIMARY INSOMNIA: ICD-10-CM

## 2022-09-28 DIAGNOSIS — J06.9 UPPER RESPIRATORY TRACT INFECTION, UNSPECIFIED TYPE: Primary | ICD-10-CM

## 2022-09-28 DIAGNOSIS — F32.A ANXIETY AND DEPRESSION: ICD-10-CM

## 2022-09-28 DIAGNOSIS — F41.1 GENERALIZED ANXIETY DISORDER: ICD-10-CM

## 2022-09-28 DIAGNOSIS — F33.1 MODERATE EPISODE OF RECURRENT MAJOR DEPRESSIVE DISORDER (HCC): Primary | ICD-10-CM

## 2022-09-28 PROCEDURE — 99214 OFFICE O/P EST MOD 30 MIN: CPT | Performed by: NURSE PRACTITIONER

## 2022-09-28 PROCEDURE — 90832 PSYTX W PT 30 MINUTES: CPT | Performed by: SOCIAL WORKER

## 2022-09-28 RX ORDER — BUPROPION HYDROCHLORIDE 75 MG/1
75 TABLET ORAL 2 TIMES DAILY
Qty: 60 TABLET | Refills: 0 | Status: SHIPPED | OUTPATIENT
Start: 2022-09-28 | End: 2022-10-27

## 2022-09-28 RX ORDER — AMOXICILLIN AND CLAVULANATE POTASSIUM 875; 125 MG/1; MG/1
1 TABLET, FILM COATED ORAL 2 TIMES DAILY
Qty: 20 TABLET | Refills: 0 | Status: SHIPPED | OUTPATIENT
Start: 2022-09-28 | End: 2022-10-08

## 2022-10-05 NOTE — PROGRESS NOTES
BEHAVIORAL HEALTH FOLLOW UP  Sasha Fuentes Consultant      Visit Date: 9/28/2022   Time of appointment:  11am   Time spent with Patient: 35 minutes. This is patient's second appointment. Pt and/or guardian was educated on the model of service to include a short-term intervention focused approach and as well as the limits of confidentiality (i.e. abuse reporting, suicide intervention, etc.). Pt and/or guardian indicated understanding. Pt and/or guardian provided informed consent for the behavioral health program.  Visit completed via audio over the telephone and consent for virtual visit was given. .   Patient Location: privately in pt home, [facility]  Provider Location: [facility], [facility]    PCP: ALEXA Erickson CNP    Reason for Consult: Follow-up       Jordana Mancilla is a 40 y.o. male who presents for follow up of depression and anxiety. He reports symptoms about the same. Has referral for psychiatry and plans to schedule this. Continues to mostly isolate, tries to go on walks as able, experiencing a lot of anxiety and panic attacks and discusses ways he tries to manage them. OBJECTIVE  Affective and emotional state appeared unable to assess affect due to phone visit. Suicidal thoughts or behaviors not present  Homicidal thoughts or behaviors not present  Hygiene was unable to assess due to phone visit  Dress was unable to assess due to phone visit  Behavior was Unable to assess due to phone visit  Attitude was Cooperative. Eye-contact was unable to assess due to phone visit. Speech is described as normal rate, normal volume, and well articulated  Thought processes were logical without evidence of delusions, hallucinations, obsessions, or sadi  Pt was oriented to person, place, time, and general circumstances with recent memory:  good and remote memory:  good.   Insight and judgment are estimated to be good     PHQ Scores 4/7/2022 4/30/2021 8/17/2020   PHQ2 Score 2 6 0   PHQ9 Score 2 20 0     Interpretation of Total Score Depression Severity: 1-4 = Minimal depression, 5-9 = Mild depression, 10-14 = Moderate depression, 15-19 = Moderately severe depression, 20-27 = Severe depression      ASSESSMENT  Bailey Rashid presented to the appointment today for follow up and treatment of depression and anxiety. He is currently deemed no risk to self or others. The main focus or themes of the visit today included reduction in anxiety, worry and panic. Lena Cline continues to develop capacity for self-care and life management. He would benefit from further behavioral health consultation to address depression and anxiety. [facility] [facility] recommended trauma specific therapy and provided name of Genie Aschoff based on pt insurance and ability to provide EMDR services. Pt will reach out to see if she has availability. We reviewed current coping strategies and educated pt on using other grounding techniques to help reduce anxiety/panic . Lena Cline was in agreement with recommendations. DIAGNOSIS  Lena Cline was seen today for follow-up. Diagnoses and all orders for this visit:    Moderate episode of recurrent major depressive disorder ([facility] [address] [address]    Generalized anxiety disorder        INTERVENTION  Therapeutic strategies included training in mindfulness and grounding techniques to reduce worry, panic and anxiety, exploring and encouraging use of practices that can support symptom management and personal growth in daily life , and engaging pt in identifying their strengths and resources. PLAN  Follow up in 2 weeks with [facility]  Is interactive complexity present?  No  Reason:  N/A  Additional Supporting Information:  N/A       Electronically signed by Salima Fields on 10/5/2022 at 12:51 PM

## 2022-10-13 ENCOUNTER — TELEPHONE (OUTPATIENT)
Dept: PRIMARY CARE CLINIC | Age: 42
End: 2022-10-13

## 2022-10-13 NOTE — TELEPHONE ENCOUNTER
----- Message from Alejandra Saldana sent at 10/13/2022  2:05 PM EDT -----  Subject: Medication Problem    Medication: Other - amitriptyline   Dosage: 50mg  Ordering Provider: aline wiley    Question/Problem: this medication is making the pt hallucinate when he   takes it.   Additional Information for Provider:     Pharmacy: [facility] [place] [id number], [address] [phone] [facility] [phone]    ---------------------------------------------------------------------------  --------------  Keke Orlando INFO  [phone]; OK to leave message on voicemail  ---------------------------------------------------------------------------  --------------    SCRIPT ANSWERS  Relationship to Patient: Self

## 2022-10-18 ENCOUNTER — TELEPHONE (OUTPATIENT)
Dept: BEHAVIORAL/MENTAL HEALTH CLINIC | Age: 42
End: 2022-10-18

## 2022-10-25 ENCOUNTER — TELEMEDICINE (OUTPATIENT)
Dept: BEHAVIORAL/MENTAL HEALTH CLINIC | Age: 42
End: 2022-10-25
Payer: MEDICAID

## 2022-10-25 DIAGNOSIS — F41.1 GENERALIZED ANXIETY DISORDER: Primary | ICD-10-CM

## 2022-10-25 PROCEDURE — 90832 PSYTX W PT 30 MINUTES: CPT | Performed by: SOCIAL WORKER

## 2022-10-27 ENCOUNTER — TELEPHONE (OUTPATIENT)
Dept: PRIMARY CARE CLINIC | Age: 42
End: 2022-10-27

## 2022-10-27 ENCOUNTER — TELEMEDICINE (OUTPATIENT)
Dept: PRIMARY CARE CLINIC | Age: 42
End: 2022-10-27
Payer: MEDICAID

## 2022-10-27 DIAGNOSIS — F41.9 ANXIETY AND DEPRESSION: ICD-10-CM

## 2022-10-27 DIAGNOSIS — F32.A ANXIETY AND DEPRESSION: ICD-10-CM

## 2022-10-27 DIAGNOSIS — Z00.00 ENCOUNTER FOR GENERAL ADULT MEDICAL EXAMINATION W/O ABNORMAL FINDINGS: Primary | ICD-10-CM

## 2022-10-27 PROCEDURE — 99396 PREV VISIT EST AGE 40-64: CPT | Performed by: NURSE PRACTITIONER

## 2022-10-27 ASSESSMENT — ENCOUNTER SYMPTOMS
SHORTNESS OF BREATH: 0
ABDOMINAL PAIN: 0
COUGH: 0
BACK PAIN: 0

## 2022-10-27 NOTE — TELEPHONE ENCOUNTER
Call to pt to let him know I made his appointment with [facility]     Friday 11/25 @ 10:30 intake over the phone   Monday 11/28 @ 9:00 assessment in person @ [address]     Pt is informed and given Rylie Scanlon
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
## 2022-11-01 NOTE — PROGRESS NOTES
BEHAVIORAL HEALTH FOLLOW UP  [place] Sasha Pedersen Consultant      Visit Date: 10/25/2022   Time of appointment:  12pm   Time spent with Patient: 30 minutes. This is patient's third appointment. Pt and/or guardian was educated on the model of service to include a short-term intervention focused approach and as well as the limits of confidentiality (i.e. abuse reporting, suicide intervention, etc.). Pt and/or guardian indicated understanding. Pt and/or guardian provided informed consent for the behavioral health program.  Visit completed via audio over the telephone and consent for virtual visit was given. .   Patient Location: privately in pt home, [facility]  Provider Location: [facility] office, [facility]    PCP: ALEXA Mariano CNP    Reason for Consult: Follow-up       Russ Zamorano is a 40 y.o. male who presents for follow up of depression and anxiety. He reports anxiety continues to persist. States he has been trying to push self to engage in activities and tasks even when it's difficult. States that in some ways he is feeling better (lessened depression) and feels that if he can get the anxiety, particularly panic, under control he will be satisfied. States hasn't contacted EMDR therapist. States that he feels his primary desire for treatment is medication that will manage his symptoms better. Reviewed psychiatry and gave pt several local providers that should take his insurance. Pt wrote them down during the visit. Reviewed strategies that help and further educated pt on importance of engaging in the activities he is currently avoiding due to the anxiety as an important aspect of reducing the anxiety overall. OBJECTIVE  Affective and emotional state appeared unable to assess affect due to phone visit.    Suicidal thoughts or behaviors not present  Homicidal thoughts or behaviors not present  Hygiene was unable to assess due to phone visit  Dress was unable to assess due to phone visit  Behavior was Unable to assess due to phone visit  Attitude was Cooperative. Eye-contact was unable to assess due to phone visit. Speech is described as normal rate, normal volume, and well articulated  Thought processes were logical without evidence of delusions, hallucinations, obsessions, or sadi  Pt was oriented to person, place, time, and general circumstances with recent memory:  good and remote memory:  good. Insight and judgment are estimated to be good     PHQ Scores 4/7/2022 4/30/2021 8/17/2020   PHQ2 Score 2 6 0   PHQ9 Score 2 20 0     Interpretation of Total Score Depression Severity: 1-4 = Minimal depression, 5-9 = Mild depression, 10-14 = Moderate depression, 15-19 = Moderately severe depression, 20-27 = Severe depression      ASSESSMENT  Jorge Alberto Christian presented to the appointment today for follow up and treatment of depression and anxiety. He is currently deemed no risk to self or others. The main focus or themes of the visit today included reduction in anxiety, worry and panic. Kiana Childs continues to develop capacity for self-care and life management. He would benefit from further behavioral health consultation to address depression and anxiety . Kiana Childs was in agreement with recommendations. DIAGNOSIS  Kiana Childs was seen today for follow-up. Diagnoses and all orders for this visit:    Generalized anxiety disorder        INTERVENTION  Therapeutic strategies included encouragement of expression of emotion, development of self-care mindset and commitment to self-care behaviors, Motivational Interviewing to increase patient confidence and compliance with adhering to behavioral change plan, training in nervous system self-regulation and relaxation, and practiced/planned exposure practices to reduce unhelpful avoidances.     PLAN  Pt will schedule and attend appointment with medical psychiatric provider  Follow up in 4 weeks with [facility]      INTERACTIVE COMPLEXITY  Is interactive complexity present?  No  Reason:  N/A  Additional Supporting Information:  N/A       Electronically signed by Tena Jewell on 11/1/2022 at 8:56 AM

## 2022-11-23 ENCOUNTER — TELEPHONE (OUTPATIENT)
Dept: NEUROLOGY | Age: 42
End: 2022-11-23

## 2022-11-23 DIAGNOSIS — G47.09 OTHER INSOMNIA: Primary | ICD-10-CM

## 2022-11-23 RX ORDER — TEMAZEPAM 15 MG/1
15 CAPSULE ORAL NIGHTLY PRN
Qty: 14 CAPSULE | Refills: 0 | Status: SHIPPED | OUTPATIENT
Start: 2022-11-23 | End: 2022-12-07

## 2022-11-23 NOTE — TELEPHONE ENCOUNTER
Abi Watkins called in concerned about Selena Montoya because he has been awake for 4 days. She said they Doll Picket it is his Vimpat causing the insomnia. She stated after writer informed her that if this is a new symptom it may be caused by something other than a medication he has been on for over 6 months. She stated this was a continued problem the patient has been having. The patient stated in the background he has been having insomnia issues for 12 months. Please advise what they can do so he can possibly get some sleep.

## 2022-11-23 NOTE — TELEPHONE ENCOUNTER
Dr Kassandra Garcia advised he can give him 14 temazepam and he should consult with a sleep specialist.  I called Cat Monroe back and gave him the message. I told him he prescribed #14 and to only use them as needed. I also advised he can see Courtney Sawant in our office and he said he is waiting for his doctor to refer him to a sleep specialist.  He appreciated the help.

## 2022-11-23 NOTE — TELEPHONE ENCOUNTER
Josh Menon called in himself. He said he can't thinkn or relax. Gets very tensed. He is wondering about what he can do for sleep.

## 2022-12-19 DIAGNOSIS — G47.09 OTHER INSOMNIA: ICD-10-CM

## 2022-12-19 NOTE — TELEPHONE ENCOUNTER
Pharmacy requesting refill of temazepam (RESTORIL) 15 MG capsule      Medication active on med list yes      Date of last Rx: 11/23/2022 with 0 refills          verified by PERLITA FIGUEROA      Date of last appointment 03/23/2022    Next Visit Date:  Visit date not found    Patient called in and stated that he is unable to get into the sleep clinic until February. He would like to know if you would be willing to refill this medication since it does help him sleep. Patient stated that his seizure medication can keep him up for days at a time. Please advise.

## 2022-12-20 DIAGNOSIS — G47.09 OTHER INSOMNIA: ICD-10-CM

## 2022-12-21 RX ORDER — TEMAZEPAM 15 MG/1
15 CAPSULE ORAL NIGHTLY PRN
Qty: 30 CAPSULE | Refills: 0 | Status: SHIPPED | OUTPATIENT
Start: 2022-12-21 | End: 2023-01-20

## 2022-12-21 RX ORDER — TEMAZEPAM 15 MG/1
CAPSULE ORAL
Qty: 14 CAPSULE | OUTPATIENT
Start: 2022-12-21

## 2022-12-21 NOTE — TELEPHONE ENCOUNTER
Patient called in and stated that he is unable to get into the sleep clinic until February. He would like to know if you would be willing to refill this medication since it does help him sleep. Patient stated that his seizure medication can keep him up for days at a time. Please advise.

## 2023-01-19 DIAGNOSIS — G47.09 OTHER INSOMNIA: ICD-10-CM

## 2023-01-19 RX ORDER — TEMAZEPAM 15 MG/1
CAPSULE ORAL
Qty: 30 CAPSULE | OUTPATIENT
Start: 2023-01-19

## 2023-01-20 DIAGNOSIS — G47.09 OTHER INSOMNIA: ICD-10-CM

## 2023-01-20 RX ORDER — TEMAZEPAM 15 MG/1
CAPSULE ORAL
Qty: 30 CAPSULE | OUTPATIENT
Start: 2023-01-20

## 2023-01-23 DIAGNOSIS — G40.909 SEIZURE DISORDER (HCC): ICD-10-CM

## 2023-01-23 RX ORDER — TEMAZEPAM 15 MG/1
15 CAPSULE ORAL NIGHTLY PRN
Qty: 30 CAPSULE | Refills: 0 | Status: SHIPPED | OUTPATIENT
Start: 2023-01-23 | End: 2023-02-22

## 2023-01-23 NOTE — TELEPHONE ENCOUNTER
Pharmacy requesting refill of lacosamide (VIMPAT) 100 MG TABS tablet       Medication active on med list yes      Date of last Rx: 09/13/2022 with 3 refills          verified by PERLITA FIGUEROA      Date of last appointment 03/30/2022    Next Visit Date:  5/2/2023

## 2023-01-23 NOTE — TELEPHONE ENCOUNTER
Pharmacy requesting refill of temazepam (RESTORIL) 15 MG capsule      Medication active on med list yes      Date of last Rx: 12/21/2022 with 0 refills          verified by PERLITA FIGUEROA      Date of last appointment 03/23/2022    Next Visit Date:  5/2/2023

## 2023-01-27 RX ORDER — LACOSAMIDE 100 MG/1
TABLET ORAL
Qty: 60 TABLET | Refills: 5 | Status: SHIPPED | OUTPATIENT
Start: 2023-01-27 | End: 2023-07-27

## 2023-02-03 RX ORDER — TRAZODONE HYDROCHLORIDE 50 MG/1
TABLET ORAL
Qty: 30 TABLET | Refills: 2 | Status: SHIPPED | OUTPATIENT
Start: 2023-02-03

## 2023-02-03 NOTE — TELEPHONE ENCOUNTER
Pharmacy requesting refill of traZODone (DESYREL) 50 MG tablet       Medication active on med list yes      Date of last Rx: 07/12/2022 with 5 refills  by Maine Arcos          verified by PERLITA FIGUEROA      Date of last appointment 03/30/2022    Next Visit Date:  5/2/2023

## 2023-02-14 ENCOUNTER — TELEPHONE (OUTPATIENT)
Dept: NEUROLOGY | Age: 43
End: 2023-02-14

## 2023-02-14 DIAGNOSIS — G47.09 OTHER INSOMNIA: ICD-10-CM

## 2023-02-14 RX ORDER — TEMAZEPAM 15 MG/1
15 CAPSULE ORAL NIGHTLY PRN
Qty: 30 CAPSULE | Refills: 0 | Status: CANCELLED | OUTPATIENT
Start: 2023-02-14 | End: 2023-03-16

## 2023-02-14 NOTE — TELEPHONE ENCOUNTER
Jesus Aguilar called in requesting a refill on his Temazepam. I told him that it was a little early. He is due for refill on 2/22/23. I advised him Dr. Dheeraj Boyle gives him #30 so that should be lasting him 30 days. He would like us to send a refill when Dr. Dheeraj Boyle approves.

## 2023-02-15 DIAGNOSIS — G47.09 OTHER INSOMNIA: ICD-10-CM

## 2023-02-15 RX ORDER — TEMAZEPAM 15 MG/1
CAPSULE ORAL
Qty: 30 CAPSULE | OUTPATIENT
Start: 2023-02-15

## 2023-02-17 ENCOUNTER — TELEMEDICINE (OUTPATIENT)
Dept: PRIMARY CARE CLINIC | Age: 43
End: 2023-02-17
Payer: MEDICAID

## 2023-02-17 DIAGNOSIS — F51.01 PRIMARY INSOMNIA: ICD-10-CM

## 2023-02-17 DIAGNOSIS — J06.9 UPPER RESPIRATORY TRACT INFECTION, UNSPECIFIED TYPE: Primary | ICD-10-CM

## 2023-02-17 PROCEDURE — 99214 OFFICE O/P EST MOD 30 MIN: CPT | Performed by: NURSE PRACTITIONER

## 2023-02-17 RX ORDER — AZITHROMYCIN 250 MG/1
TABLET, FILM COATED ORAL
Qty: 6 TABLET | Refills: 0 | Status: SHIPPED | OUTPATIENT
Start: 2023-02-17 | End: 2023-02-27

## 2023-02-17 RX ORDER — AMOXICILLIN AND CLAVULANATE POTASSIUM 875; 125 MG/1; MG/1
1 TABLET, FILM COATED ORAL 2 TIMES DAILY
Qty: 20 TABLET | Refills: 0 | Status: SHIPPED | OUTPATIENT
Start: 2023-02-17 | End: 2023-02-17

## 2023-02-17 ASSESSMENT — ENCOUNTER SYMPTOMS
SINUS PRESSURE: 1
COUGH: 1
ABDOMINAL PAIN: 0
SHORTNESS OF BREATH: 0
SINUS PAIN: 1
BACK PAIN: 0
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
## 2023-02-20 DIAGNOSIS — G47.09 OTHER INSOMNIA: ICD-10-CM

## 2023-02-20 NOTE — TELEPHONE ENCOUNTER
Please help process a prescription refill for this patient. Needs a refill of temazepam   15 mg capsule.

## 2023-02-21 RX ORDER — TEMAZEPAM 15 MG/1
15 CAPSULE ORAL NIGHTLY PRN
Qty: 30 CAPSULE | Refills: 0 | Status: SHIPPED | OUTPATIENT
Start: 2023-02-21 | End: 2023-03-23

## 2023-02-21 NOTE — TELEPHONE ENCOUNTER
I phoned the refill in.   Also he has been scheduled to see Dr. Karey Duong for sleep as Dr. Yaa Sun advised him previously he wanted him to be seeing a sleep specialist.

## 2023-02-21 NOTE — TELEPHONE ENCOUNTER
Mr. Haider Steele was advised last week he was given 30 tablets on 1/23 and he should have had enough until 2/22.

## 2023-03-01 NOTE — TELEPHONE ENCOUNTER
Assessment & Plan     Frequent urination  Urinalysis without evidence of infection, blood or protein.  Prior work-up has been unremarkable other than slightly elevated creatinine.  He reports that prior to this he was drinking a gallon of coffee a day.  Now drinking 1 cup/day.  Trying to stay well-hydrated.  Recheck BMP today.  Also recheck hepatic panel has not been recently checked.  --Refer to urology for further evaluation of urine frequency with unremarkable initial work-up.  - UA macro with reflex to Microscopic and Culture - Clinc Collect  - Adult Urology  Referral  - Basic metabolic panel  (Ca, Cl, CO2, Creat, Gluc, K, Na, BUN)  - Hepatic panel (Albumin, ALT, AST, Bili, Alk Phos, TP)    Elevated serum creatinine  Recheck today.   - Adult Urology  Referral  - Basic metabolic panel  (Ca, Cl, CO2, Creat, Gluc, K, Na, BUN)    The risks, benefits and treatment options of prescribed medications or other treatments have been discussed with the patient. The patient verbalized their understanding and should call or follow up if no improvement or if they develop further problems.    Follow up with Urology.     Barrera Muse, [facility]    Tobi Gao is a 38 year old, presenting for the following health issues:  Urinary Problem      History of Present Illness       Reason for visit:  Frequent urination / other related symptoms    He eats 2-3 servings of fruits and vegetables daily.He consumes 0 sweetened beverage(s) daily.He exercises with enough effort to increase his heart rate 30 to 60 minutes per day.  He exercises with enough effort to increase his heart rate 6 days per week.   He is taking medications regularly.     38 year old male who presents to clinic for urinary concerns.     Recent lab work on 1/12/2023 satisfactory and included lipid panel, [id number], CBC, TSH, magnesium, phosphorus.    BMP with slight elevation of creatinine at 1.38.     Has been  Pt called in stating that he had a virtual visit with psych last week, 9/14/22. He said that they didn't prescribe anything yet for his anxiety. He is not sure what to do at this point. He said that it gets so bad sometimes that he can not breath. He is having a hard time talking to his family. He is asking if we can provide him with medication for his anxiety. Please advise, thanks. "trying to stay well hydrated.   Drinks one cup of coffee in the morning, when last had labs checked was drinking quite a bit of coffee.       Genitourinary - Male  Onset/Duration: 1 year  Description:   Dysuria (painful urination): No}  Hematuria (blood in urine): No  Frequency: YES  Waking at night to urinate: YES- 2-3 times  Hesitancy (delay in urine): YES  Retention (unable to empty): YES  Decrease in urinary flow: YES  Incontinence: No  Progression of Symptoms:  worsening  Accompanying Signs & Symptoms:  Fever: No  Back/Flank pain: YES  Urethral discharge: No  Testicle lumps/masses/pain: No  Nausea and/or vomiting: No  Abdominal pain: YES  History:   History of frequent UTI s: No  History of kidney stones: No  History of hernias: YES  Personal or Family history of Prostate problems: No  Sexually active: YES  Precipitating or alleviating factors: had some blood tests last year and never followed up with uronogy and repeat blood test.  Therapies tried and outcome: none    PSA 0.29 7/26/2022    Patient pleasant on the right upper quadrant pain that is persistent for over a year.  The passes underwent ultrasound which was unremarkable and also CBC and hepatic panel which was also unremarkable.    Patient reports drinking episode of vinegar in the morning and at night is he thought that this was helping with his digestion issues.  This could be a culprit for his urinary frequency.    Review of Systems   Constitutional, HEENT, cardiovascular, pulmonary, gi and gu systems are negative, except as otherwise noted.      Objective    /80 (BP Location: Left arm, Patient Position: Chair, Cuff Size: Adult Regular)   Pulse 70   Temp 98  F (36.7  C) (Tympanic)   Resp 18   Ht 1.787 m (5' 10.37\")   Wt 82.6 kg (182 lb)   SpO2 98%   BMI 25.84 kg/m    Body mass index is 25.84 kg/m .  Physical Exam   General: alert, cooperative, no acute distress   CV: RRR, no murmur  Resp: non-labored breathing, clear to auscultation, " no wheezing or rales   Abdomen: Soft, non-tender, no guarding.  No flank pain.  Reyes testing negative.  Melara testing negative.  Extremities: No peripheral edema, calves non-tender.

## 2023-03-23 DIAGNOSIS — G47.09 OTHER INSOMNIA: ICD-10-CM

## 2023-03-23 RX ORDER — TEMAZEPAM 15 MG/1
CAPSULE ORAL
Qty: 30 CAPSULE | Refills: 0 | Status: SHIPPED | OUTPATIENT
Start: 2023-03-23 | End: 2023-04-22

## 2023-03-23 NOTE — TELEPHONE ENCOUNTER
Patient requesting refill of Temazepam.      Medication active on med list yes      Date of last fill: 2/21/23  with 0 refills verified on 3/23/23  verified by KELLY RN      Date of last appointment 3/23/22    Next Visit Date:  4/4/2023  to see Dr. Adal Tapia for his sleep issues.

## 2023-04-04 ENCOUNTER — OFFICE VISIT (OUTPATIENT)
Dept: NEUROLOGY | Age: 43
End: 2023-04-04
Payer: MEDICAID

## 2023-04-04 VITALS
HEIGHT: 66 IN | DIASTOLIC BLOOD PRESSURE: 68 MMHG | WEIGHT: 228 LBS | BODY MASS INDEX: 36.64 KG/M2 | SYSTOLIC BLOOD PRESSURE: 112 MMHG | HEART RATE: 60 BPM

## 2023-04-04 DIAGNOSIS — G47.09 OTHER INSOMNIA: Primary | ICD-10-CM

## 2023-04-04 DIAGNOSIS — G47.30 SLEEP APNEA, UNSPECIFIED TYPE: ICD-10-CM

## 2023-04-04 PROCEDURE — 99215 OFFICE O/P EST HI 40 MIN: CPT | Performed by: STUDENT IN AN ORGANIZED HEALTH CARE EDUCATION/TRAINING PROGRAM

## 2023-04-04 RX ORDER — TEMAZEPAM 15 MG/1
CAPSULE ORAL
Qty: 30 CAPSULE | Refills: 1 | Status: SHIPPED | OUTPATIENT
Start: 2023-04-04 | End: 2024-04-18

## 2023-04-04 RX ORDER — AMOXICILLIN AND CLAVULANATE POTASSIUM 875; 125 MG/1; MG/1
TABLET, FILM COATED ORAL
COMMUNITY
Start: 2023-02-17
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
## 2023-04-04 NOTE — PATIENT INSTRUCTIONS
Recommned Cognitive behavorial therapy for insomnia    [email or web address]    You have insomnia:    1. You should sleep in only one location which is your bedroom. Do not watch TV, use computer or read in your bedroom. You want to associate your bedroom with sleep only. You should do these things in a different room. 2. Your sleep environment should be dark with no light exposure. 3. When you wake in the morning you should exposure yourself to bright light. 4. No naps    7. Do not watch the clock    8. No caffeine after noontime    9. Try to exercise in the late afternoon or early evening as many days a week as possible. Other suggestions:   1. Have a wind down time of at least 30 minutes before you get into bed. 2. If it has been 20 minutes and you can't sleep, get out of bed and do something boring in a different room and only come back to bed when sleepy  3.. Write down all the things that bother you or things you have to do before you go to bed so you are not thinking about them during your sleep time.

## 2023-04-21 DIAGNOSIS — G47.09 OTHER INSOMNIA: ICD-10-CM

## 2023-04-21 RX ORDER — TEMAZEPAM 15 MG/1
CAPSULE ORAL
Qty: 30 CAPSULE | OUTPATIENT
Start: 2023-04-21

## 2023-05-02 ENCOUNTER — TELEMEDICINE (OUTPATIENT)
Dept: NEUROLOGY | Age: 43
End: 2023-05-02
Payer: MEDICAID

## 2023-05-02 DIAGNOSIS — G40.909 SEIZURE DISORDER (HCC): Primary | ICD-10-CM

## 2023-05-02 PROCEDURE — 99214 OFFICE O/P EST MOD 30 MIN: CPT | Performed by: PSYCHIATRY & NEUROLOGY

## 2023-05-02 NOTE — PROGRESS NOTES
and will take the medication daily. He denies any seizures in the past year. Neurological Workup:  EEG on 04/27/22: Normal continuous video-EEG. EKG lead did not show clear arrhythmia, if still in concern consider formal EKG or correlation with telemetry. No epileptiform discharges were identified. Please note the absence of   such activity in this record cannot conclusively rule out an epileptic   disorder. If such is still clinically suspected, a repeat study with prolonged sampling may be helpful. MRI Brain W WO Contrast on 01/18/22: Minimal T2 hyperintensity within the periaqueductal gray matter, right greater than left. No associated contrast enhancement. Otherwise, no acute intracranial abnormality. No acute infarct. The hippocampal structures appear symmetric. EEG awake and asleep 1/14/22:   consistent with the interictal expression of a localization-related epilepsy and indicates that this patient is at increased risk of focal as well as secondarily generalized tonic-clonic seizures. The focal slowing is a sign of focal cortical neuronal dysfunction in the involved region. Such an EEG picture indicate patient is likely seizure    EEG awake and asleep 1/12/22: consistent with the interictal expression of a localization-related epilepsy and indicates that this patient is at increased risk of focal as well as secondarily generalized tonic-clonic seizures. The focal slowing is a sign of focal cortical neuronal dysfunction in the involved region. The background slowing is consistent with a mild encephalopathy. No electrographic seizures were recorded. Bj Beauchamp MRI brain W WO contrast 1/18/22:  1. Minimal T2 hyperintensity within the periaqueductal gray matter, right   greater than left. No associated contrast enhancement. 2. Otherwise, no acute intracranial abnormality. No acute infarct. 3. The hippocampal structures appear symmetric.      MRI brain WO contrast 1/12/22: No acute abnormality    Past

## 2023-06-20 DIAGNOSIS — G47.09 OTHER INSOMNIA: ICD-10-CM

## 2023-06-20 RX ORDER — TEMAZEPAM 15 MG/1
CAPSULE ORAL
Qty: 30 CAPSULE | Refills: 1 | Status: SHIPPED | OUTPATIENT
Start: 2023-06-20 | End: 2023-08-19

## 2023-06-20 NOTE — TELEPHONE ENCOUNTER
Pharmacy requesting refill of Temazepam 15 mg.       Medication active on med list: yes      Date of last fill: 4/4/23 for #30 and 1 refill  verified on 6/20/2023    verified by Thai Otto.TREVOR      Date of last appointment: 5/2/2023    Next Visit Date: 6m f/u - schedule currently unavailable

## 2023-06-22 ENCOUNTER — TELEPHONE (OUTPATIENT)
Dept: NEUROLOGY | Age: 43
End: 2023-06-22

## 2023-06-27 ENCOUNTER — OFFICE VISIT (OUTPATIENT)
Dept: ORTHOPEDIC SURGERY | Age: 43
End: 2023-06-27

## 2023-06-27 VITALS — OXYGEN SATURATION: 100 % | RESPIRATION RATE: 16 BRPM | BODY MASS INDEX: 34.56 KG/M2 | WEIGHT: 228 LBS | HEIGHT: 68 IN

## 2023-06-27 DIAGNOSIS — M25.572 LEFT ANKLE PAIN, UNSPECIFIED CHRONICITY: Primary | ICD-10-CM

## 2023-06-27 DIAGNOSIS — Z98.1 ARTHRODESIS STATUS: ICD-10-CM

## 2023-06-27 DIAGNOSIS — M19.079 ARTHRITIS OF SUBTALAR JOINT: Primary | ICD-10-CM

## 2023-06-27 DIAGNOSIS — Z96.9 RETAINED ORTHOPEDIC HARDWARE: ICD-10-CM

## 2023-06-27 RX ORDER — TRIAMCINOLONE ACETONIDE 40 MG/ML
40 INJECTION, SUSPENSION INTRA-ARTICULAR; INTRAMUSCULAR ONCE
Status: COMPLETED | OUTPATIENT
Start: 2023-06-27 | End: 2023-06-27

## 2023-06-27 RX ORDER — LIDOCAINE HYDROCHLORIDE 10 MG/ML
2 INJECTION, SOLUTION INFILTRATION; PERINEURAL ONCE
Status: COMPLETED | OUTPATIENT
Start: 2023-06-27 | End: 2023-06-27

## 2023-06-27 RX ADMIN — LIDOCAINE HYDROCHLORIDE 2 ML: 10 INJECTION, SOLUTION INFILTRATION; PERINEURAL at 12:30

## 2023-06-27 RX ADMIN — TRIAMCINOLONE ACETONIDE 40 MG: 40 INJECTION, SUSPENSION INTRA-ARTICULAR; INTRAMUSCULAR at 12:30

## 2023-06-29 ENCOUNTER — TELEPHONE (OUTPATIENT)
Dept: ORTHOPEDIC SURGERY | Age: 43
End: 2023-06-29

## 2023-06-29 DIAGNOSIS — Z98.1 ARTHRODESIS STATUS: Primary | ICD-10-CM

## 2023-06-29 DIAGNOSIS — M19.079 ARTHRITIS OF SUBTALAR JOINT: ICD-10-CM

## 2023-06-29 DIAGNOSIS — Z96.9 RETAINED ORTHOPEDIC HARDWARE: ICD-10-CM

## 2023-08-21 DIAGNOSIS — G47.09 OTHER INSOMNIA: ICD-10-CM

## 2023-08-21 RX ORDER — TEMAZEPAM 15 MG/1
CAPSULE ORAL
Qty: 30 CAPSULE | Refills: 1 | Status: SHIPPED | OUTPATIENT
Start: 2023-08-21 | End: 2023-10-20

## 2023-08-21 NOTE — TELEPHONE ENCOUNTER
Patient calling for refill of Temazepam.      Medication active on med list: no      Date of last fill: 6/20/23 for #30 and 1 refill  verified on 8/21/2023    verified by Anna Whelan LPN      Date of last appointment: 4/4/2023    Next Visit Date:  10/4/2023

## 2023-10-04 ENCOUNTER — TELEMEDICINE (OUTPATIENT)
Dept: NEUROLOGY | Age: 43
End: 2023-10-04
Payer: MEDICAID

## 2023-10-04 DIAGNOSIS — G47.09 OTHER INSOMNIA: Primary | ICD-10-CM

## 2023-10-04 PROCEDURE — 99214 OFFICE O/P EST MOD 30 MIN: CPT | Performed by: STUDENT IN AN ORGANIZED HEALTH CARE EDUCATION/TRAINING PROGRAM

## 2023-10-04 NOTE — PROGRESS NOTES
the day    3. Exercise as many days as possible    4. He  has been educated to avoid caffeine in the afternoon. 6. No naps or resting during the day. 7. Keep a sleep diary    Heri Jeong, was evaluated through a synchronous (real-time) audio-video encounter. The patient (or guardian if applicable) is aware that this is a billable service, which includes applicable co-pays. This Virtual Visit was conducted with patient's (and/or legal guardian's) consent. Patient identification was verified, and a caregiver was present when appropriate. The patient was located at Home: [address] [place] [address]  Provider was located at [facility] (Appt Dept): [address] [place] [address],  [address] [facility]      Total time spent for this encounter: Not billed by time    --Kerry Levi MD on 10/4/2023 at 11:11 AM    An electronic signature was used to authenticate this note.      Kerry Levi MD   Neurology & Sleep Medicine  [facility]

## 2023-10-18 DIAGNOSIS — G47.09 OTHER INSOMNIA: ICD-10-CM

## 2023-10-19 RX ORDER — TEMAZEPAM 15 MG/1
CAPSULE ORAL
Qty: 30 CAPSULE | Refills: 1 | Status: SHIPPED | OUTPATIENT
Start: 2023-10-20 | End: 2023-12-19

## 2023-10-19 NOTE — TELEPHONE ENCOUNTER
Pharmacy requesting refill of Temazepam.      Medication active on med list: yes      Date of last fill: 8/21/23 for #30 and 1 refill  verified on 10/19/2023    verified by Harshad Madrigal LPN      Date of last appointment: 10/4/2023    Next Visit Date:  4/4/2024

## 2023-10-23 ENCOUNTER — INITIAL CONSULT (OUTPATIENT)
Dept: PAIN MANAGEMENT | Age: 43
End: 2023-10-23
Payer: MEDICAID

## 2023-10-23 VITALS — BODY MASS INDEX: 37.35 KG/M2 | WEIGHT: 232.4 LBS | HEIGHT: 66 IN

## 2023-10-23 DIAGNOSIS — G89.29 OTHER CHRONIC PAIN: ICD-10-CM

## 2023-10-23 DIAGNOSIS — G89.29 CHRONIC PAIN OF LEFT ANKLE: ICD-10-CM

## 2023-10-23 DIAGNOSIS — M25.572 CHRONIC PAIN OF LEFT ANKLE: ICD-10-CM

## 2023-10-23 DIAGNOSIS — Z98.1 HX OF ANKLE FUSION: Primary | ICD-10-CM

## 2023-10-23 PROCEDURE — 99204 OFFICE O/P NEW MOD 45 MIN: CPT | Performed by: PAIN MEDICINE
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
## 2023-11-06 ENCOUNTER — TELEPHONE (OUTPATIENT)
Dept: ORTHOPEDIC SURGERY | Age: 43
End: 2023-11-06

## 2023-11-06 NOTE — TELEPHONE ENCOUNTER
Can you call the patient for me and inform him that I faxed over his clinical notes with the original referral, and just faxed them again. They should have everything that they need. It was his last clinical note from June of this year. If he is wanting all of his records sent over, he will need to fill out a medical records request and have medical records fax all of his notes to their office. Thank you.

## 2023-11-06 NOTE — TELEPHONE ENCOUNTER
Patient called to follow up on his pain management referral for the [facility]. He states they received his referral,  however, they are still waiting for office notes along with imaging to be sent prior to scheduling.    Patient is asking for a return call as soon as possible.    Thank you

## 2023-11-09 NOTE — TELEPHONE ENCOUNTER
11/9/23: Spoke with patient and advised notes and referral sent over by Alyssia. Patient verbalized understanding.

## 2023-12-21 ENCOUNTER — TELEPHONE (OUTPATIENT)
Dept: PRIMARY CARE CLINIC | Age: 43
End: 2023-12-21

## 2023-12-21 NOTE — TELEPHONE ENCOUNTER
Jefry Tenorio was contacted by Ashley Matias MA, a Community Health Navigator, regarding a Social Determinants of Health referral.     A message was left with the writer's contact information.  [phone]    Follow-up attempt.

## 2023-12-28 NOTE — TELEPHONE ENCOUNTER
Jefry Tenorio was contacted by Ashley Matias MA, a Community Health Navigator, regarding a Social Determinants of Health referral.     A message was left with the writer's contact information.  [phone]    Final follow-up attempt. Writer left voicemail to contact office by January 4, 2023 for SDOH services, after that time patient will need to contact PCP for new referral.

## 2024-01-08 NOTE — TELEPHONE ENCOUNTER
Jefry Tenorio was contacted by Ashley Matias MA, a Community Health Navigator, regarding a Social Determinants of Health referral.     Writer made three attempts to reach out to patient leaving voicemail with contact information and no response. Writer will close referral at this time, patient will need to contact PCP for new referral.    Closed referral

## 2024-01-29 ENCOUNTER — TELEPHONE (OUTPATIENT)
Dept: PRIMARY CARE CLINIC | Age: 44
End: 2024-01-29

## 2024-01-29 DIAGNOSIS — F51.01 PRIMARY INSOMNIA: ICD-10-CM

## 2024-01-29 DIAGNOSIS — R23.9 SKIN CHANGE: Primary | ICD-10-CM

## 2024-01-29 NOTE — TELEPHONE ENCOUNTER
Called Pt and asked reason for referral. He said a couple months ago he went into the hospital for cyst on under arm. Said since then he now has one on leg and they told him to follow up with one. Said its a cyst now in his back right leg, please advise

## 2024-01-29 NOTE — TELEPHONE ENCOUNTER
----- Message from Janette Moreno sent at 1/29/2024 11:49 AM EST -----  Subject: Referral Request    Reason for referral request? PT needs a referral for dermatologist   Provider patient wants to be referred to(if known):     Provider Phone Number(if known):    Additional Information for Provider? Please call PT back to go over   referral information and any questions   ---------------------------------------------------------------------------  --------------  CALL BACK INFO    [phone]; OK to leave message on voicemail  ---------------------------------------------------------------------------  --------------

## 2024-02-14 DIAGNOSIS — R21 RASH: Primary | ICD-10-CM

## 2024-02-14 RX ORDER — PERMETHRIN 50 MG/G
CREAM TOPICAL
Status: CANCELLED | OUTPATIENT
Start: 2024-02-14

## 2024-02-19 ENCOUNTER — TELEPHONE (OUTPATIENT)
Dept: PRIMARY CARE CLINIC | Age: 44
End: 2024-02-19

## 2024-02-19 NOTE — TELEPHONE ENCOUNTER
I am unsure who else would cover  Please have him check with insurance for recommendation of sleep specialist

## 2024-02-19 NOTE — TELEPHONE ENCOUNTER
Pt is asking about pulmonary referral. Looks like Dr. mcghee office closed the referral due to no show hx, please place new referral somewhere else, thank you

## 2024-02-26 ENCOUNTER — OFFICE VISIT (OUTPATIENT)
Dept: FAMILY MEDICINE CLINIC | Age: 44
End: 2024-02-26
Payer: MEDICAID

## 2024-02-26 VITALS
SYSTOLIC BLOOD PRESSURE: 128 MMHG | OXYGEN SATURATION: 99 % | WEIGHT: 232 LBS | BODY MASS INDEX: 37.45 KG/M2 | RESPIRATION RATE: 16 BRPM | HEART RATE: 68 BPM | DIASTOLIC BLOOD PRESSURE: 74 MMHG

## 2024-02-26 DIAGNOSIS — B86 SCABIES: Primary | ICD-10-CM

## 2024-02-26 PROCEDURE — 99213 OFFICE O/P EST LOW 20 MIN: CPT | Performed by: NURSE PRACTITIONER

## 2024-02-26 RX ORDER — PERMETHRIN 50 MG/G
CREAM TOPICAL
Qty: 60 G | Refills: 0 | Status: SHIPPED | OUTPATIENT
Start: 2024-02-26

## 2024-02-26 RX ORDER — PREDNISONE 20 MG/1
20 TABLET ORAL DAILY
Qty: 5 TABLET | Refills: 0 | Status: SHIPPED | OUTPATIENT
Start: 2024-02-26 | End: 2024-03-02

## 2024-02-26 RX ORDER — IVERMECTIN 3 MG/1
200 TABLET ORAL ONCE
Qty: 7 TABLET | Refills: 0 | Status: SHIPPED | OUTPATIENT
Start: 2024-02-26 | End: 2024-02-26

## 2024-02-26 ASSESSMENT — ENCOUNTER SYMPTOMS
RHINORRHEA: 0
VOMITING: 0
ABDOMINAL PAIN: 0
SORE THROAT: 0
NAUSEA: 0
TROUBLE SWALLOWING: 0
SHORTNESS OF BREATH: 0
COUGH: 0
WHEEZING: 0
BACK PAIN: 0

## 2024-02-26 NOTE — PROGRESS NOTES
ANKLE Left 10/15/2020    LEFT ANKLE FUSION WITH BONE GRAFT- PARAGON 28 performed by Nirav Gonzalez MD at [facility] OR    HIP SURGERY Left used bone for left ankle surgery       Family History   Problem Relation Age of Onset    Heart Attack Maternal Grandmother        Social History     Tobacco Use    Smoking status: Former     Current packs/day: 0.00     Types: Cigarettes     Quit date:      Years since quittin.1     Passive exposure: Never    Smokeless tobacco: Never   Substance Use Topics    Alcohol use: Yes     Comment: social        Prior to Visit Medications    Medication Sig Taking? Authorizing Provider   predniSONE (DELTASONE) 20 MG tablet Take 1 tablet by mouth daily for 5 days Yes Stefanie Feldman APRN - CNP   permethrin (ELIMITE) 5 % cream Massage into skin from head to feet, leave on 8-14 hours, wash off.  May repeat in 14 days. Yes Stefanie Feldman APRN - CNP   ivermectin 3 MG tablet Take 7 tablets by mouth once for 1 dose Yes Stefanie Feldman APRN - CNP   temazepam (RESTORIL) 15 MG capsule TAKE 1 CAPSULE BY MOUTH EVERY NIGHT AS NEEDED FOR SLEEP OR INSOMNIA. MAX DAILY AMOUNT: 15 MG Yes Myla Hicks MD   lacosamide (VIMPAT) 100 MG TABS tablet TAKE 1 TABLETS BY MOUTH TWICE DAILY Yes Bertin Palm MD       No Known Allergies      Subjective:      Review of Systems   Constitutional:  Negative for chills and fever.   HENT:  Negative for congestion, ear pain, postnasal drip, rhinorrhea, sore throat and trouble swallowing.    Respiratory:  Negative for cough, shortness of breath and wheezing.    Cardiovascular:  Negative for chest pain and palpitations.   Gastrointestinal:  Negative for abdominal pain, nausea and vomiting.   Genitourinary:  Negative for dysuria and frequency.   Musculoskeletal:  Positive for arthralgias, gait problem and joint swelling. Negative for back pain and myalgias.   Skin:  Positive for rash.   Neurological:  Negative for dizziness, tingling, numbness and headaches.

## 2024-04-24 DIAGNOSIS — G47.09 OTHER INSOMNIA: ICD-10-CM

## 2024-04-24 RX ORDER — TEMAZEPAM 15 MG/1
15 CAPSULE ORAL NIGHTLY PRN
Qty: 30 CAPSULE | Refills: 2 | Status: SHIPPED | OUTPATIENT
Start: 2024-04-24 | End: 2024-07-23

## 2024-04-24 NOTE — TELEPHONE ENCOUNTER
Patient calling for refill of Temazepam.      Medication active on med list yes      Date of last fill: 12/20/23  verified on 4/24/2024   verified by SF LPN      Date of last appointment 10/4/23    Next Visit Date:  7/16/2024

## 2024-07-22 DIAGNOSIS — G47.09 OTHER INSOMNIA: ICD-10-CM

## 2024-07-22 RX ORDER — TEMAZEPAM 15 MG/1
15 CAPSULE ORAL NIGHTLY PRN
Qty: 30 CAPSULE | Refills: 2 | Status: SHIPPED | OUTPATIENT
Start: 2024-07-22 | End: 2024-10-20

## 2024-07-22 NOTE — TELEPHONE ENCOUNTER
Previous Dr. Hicks patient; Set to see Dr. Palm in November.     Pharmacy requesting refill of temazepam 15mg.      Medication active on med list yes      Date of last Rx: 4/24/2024 with 2 refills          verified by TREVOR SKAGGS      Date of last appointment 10/4/2023    Next Visit Date:  11/26/2024

## 2024-09-10 NOTE — PROGRESS NOTES
Pt slept well throughout the night; restless at times; would wake up and say a few words then fall back asleep. Pt's heart rate did elevate a few times (up to 140's) while sleeping for a minimal time; no signs of seizure activity with elevated heart rate. Pt incontinent throughout night - voiding and passing stool. Patient continues with one to one care. [FreeTextEntry1] : The patient has a history of CVA in 2012 , S/P CTA which showed moderate carotid disease. 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 . He is now on low dose Atorvastatin and is on Plavix  He is not taking antiplatelet medication. He denies having chest pain. He did not have have his MCOT done. He did have Covid in  The patient is going for multiple orthopedic surgeries. He has some STRONG when he walks up stairs. The patient needs carpal tunnel surgery and right shoulder surgery. The patient has had a nuclear stress test which was negative for ischemia and had echocardiogram which showed normal LV systolic function The patient had an epatch which showed no arrhythmias. He did have a period oif bradycardia during sleep. It is suspected that he had SHAMIKA which is not treated. The patient should be considered at least an intermediate cariad risk undergoing an intermediate risk procedure. In view of this I do suggest this is done in a hospital setting. He should be on Plavix until 5 days prior to surgery. He should not take NSAIDS in view of above history. He needs to be cleared by neurology as well.  He has not had chest pain or SOB . He has remained unchanged from last PAT assessment.

## 2024-10-19 DIAGNOSIS — G47.09 OTHER INSOMNIA: ICD-10-CM

## 2024-10-21 RX ORDER — TEMAZEPAM 15 MG/1
15 CAPSULE ORAL NIGHTLY PRN
Qty: 30 CAPSULE | Refills: 0 | Status: SHIPPED | OUTPATIENT
Start: 2024-10-21 | End: 2024-11-20

## 2024-10-21 NOTE — TELEPHONE ENCOUNTER
Pharmacy requesting refill of Temazepam 15 mg capsules.    Medication active on med list yes    Date of last Rx: 7/22/2024 #30 with 2 refills   verified by MAHOGANY Alarcon    Date of last appointment 10/4/2023    Next Visit Date:  11/26/2024 with Dr. Palm

## 2024-11-08 ENCOUNTER — OFFICE VISIT (OUTPATIENT)
Dept: NEUROLOGY | Age: 44
End: 2024-11-08
Payer: MEDICAID

## 2024-11-08 VITALS
WEIGHT: 213.2 LBS | DIASTOLIC BLOOD PRESSURE: 64 MMHG | HEIGHT: 66 IN | HEART RATE: 63 BPM | SYSTOLIC BLOOD PRESSURE: 110 MMHG | BODY MASS INDEX: 34.27 KG/M2

## 2024-11-08 DIAGNOSIS — G40.909 SEIZURE DISORDER (HCC): ICD-10-CM

## 2024-11-08 PROCEDURE — 99214 OFFICE O/P EST MOD 30 MIN: CPT | Performed by: PSYCHIATRY & NEUROLOGY

## 2024-11-08 RX ORDER — LACOSAMIDE 200 MG/1
200 TABLET ORAL 2 TIMES DAILY
Qty: 180 TABLET | Refills: 3 | Status: SHIPPED | OUTPATIENT
Start: 2024-11-08 | End: 2025-02-06

## 2024-11-08 NOTE — PROGRESS NOTES
[facility] [place]  [address], [address]  [place] [address]  Ph: [phone] or [phone]  FAX: [phone]      Reason for consult: Seizure disorder evaluation  I had the pleasure of seeing Jefry Tenorio, a 44-year-old male, for a neurology consultation to evaluate his history of seizures. Jefry has experienced multiple seizure episodes over the past few years, most notably a hospitalization in 2019 for recurrent generalized seizures and altered mental status. He continues to have focal seizures, including a recent \"near-seizure\" episode triggered by a strong smell and accompanied by eye twitching and a sense of weakness.    Jefry's seizure history dates back to July 2019 when he experienced his first seizure episode. In August 2019, he was admitted following two episodes of sudden loss of consciousness with generalized shaking. The first occurred while parking his car, leading to a brief period of unconsciousness and postictal confusion lasting approximately 15 minutes. The second episode took place outside a shop, where he collapsed and experienced a seizure with eyes rolling back, generalized shaking, tongue biting, and facial lacerations. He denied bowel or bladder incontinence. These events were preceded by recent cessation of Xanax, which he had used for severe anxiety. Notably, his first seizure episode occurred two days after stopping Xanax, raising the possibility of withdrawal-induced seizures.  During his 2019 hospital stay, he was initiated on Keppra 1 g followed by 500 mg BID and tolerated it well without further seizures during the admission. Imaging studies, including a noncontrast CT head and MRI, were unremarkable. A routine EEG was also normal. He was discharged with a gradual tapering plan for Xanax and advised to continue Keppra.  In the years since, Jefry has continued to experience focal seizures, often characterized by eye twitching and a

## 2024-11-19 DIAGNOSIS — G47.09 OTHER INSOMNIA: ICD-10-CM

## 2024-11-19 NOTE — TELEPHONE ENCOUNTER
Pharmacy requesting refill of Temazepam 15 mg capsules.    Medication active on med list yes    Date of last Rx: 10/21/2024 #30 with 0 refills   verified by MAHOGANY Alarcon    Date of last appointment 11/8/2024    Next Visit Date:  Visit date not found       Other (Free Text): I & D Note Text (......Xxx Chief Complaint.): This diagnosis correlates with the Detail Level: Zone

## 2024-11-20 RX ORDER — TEMAZEPAM 15 MG/1
CAPSULE ORAL
Qty: 30 CAPSULE | Refills: 0 | Status: SHIPPED | OUTPATIENT
Start: 2024-11-20 | End: 2024-12-20

## 2024-12-19 DIAGNOSIS — G47.09 OTHER INSOMNIA: ICD-10-CM

## 2024-12-19 NOTE — TELEPHONE ENCOUNTER
Pharmacy requesting refill of Temazepam 15 mg.      Medication active on med list: yes      Date of last fill: 11/20/24 for #30 NR  verified on 12/19/2024    verified by Dea JESUS LPN      Date of last appointment: 11/8/2024    Next Visit Date:  Visit date not found

## 2024-12-23 DIAGNOSIS — F51.01 PRIMARY INSOMNIA: Primary | ICD-10-CM

## 2024-12-23 RX ORDER — TEMAZEPAM 15 MG/1
CAPSULE ORAL
Qty: 30 CAPSULE | Refills: 0 | Status: SHIPPED | OUTPATIENT
Start: 2024-12-23 | End: 2025-01-22

## 2024-12-23 RX ORDER — TEMAZEPAM 15 MG/1
15 CAPSULE ORAL NIGHTLY PRN
Qty: 30 CAPSULE | Refills: 0 | Status: SHIPPED | OUTPATIENT
Start: 2024-12-23 | End: 2025-01-22

## 2024-12-23 NOTE — TELEPHONE ENCOUNTER
Pharmacy requesting refill of Temazepam 15 mg capsules.    Medication active on med list yes    Date of last Rx: 11/20/2024 #30 with 0 refills   verified by MAHOGANY Alarcon    Date of last appointment 11/8/2024    Next Visit Date:  Visit date not found

## 2025-01-22 DIAGNOSIS — F51.01 PRIMARY INSOMNIA: ICD-10-CM

## 2025-01-23 RX ORDER — TEMAZEPAM 15 MG/1
15 CAPSULE ORAL NIGHTLY PRN
Qty: 30 CAPSULE | Refills: 0 | Status: SHIPPED | OUTPATIENT
Start: 2025-01-23 | End: 2025-02-22

## 2025-01-23 NOTE — TELEPHONE ENCOUNTER
Pharmacy requesting refill of Restoril 15 mg capsules.    Medication active on med list yes    Date of last Rx: 12/23/2024 #30 with 0 refills   verified by MAHOGANY Alarcon    Date of last appointment 11/8/2024    Next Visit Date:  Visit date not found

## 2025-02-24 DIAGNOSIS — F51.01 PRIMARY INSOMNIA: ICD-10-CM

## 2025-02-24 NOTE — TELEPHONE ENCOUNTER
Pharmacy requesting refill of Temazepam 15 mg.    Medication active on med list yes    Date of last Rx: 1/23/2025 #30 with 0 refills   verified by MAHOGANY Alarcon    Date of last appointment 11/8/2024    Next Visit Date:  Visit date not found

## 2025-02-25 RX ORDER — TEMAZEPAM 15 MG/1
15 CAPSULE ORAL NIGHTLY PRN
Qty: 30 CAPSULE | Refills: 2 | Status: SHIPPED | OUTPATIENT
Start: 2025-02-25 | End: 2025-05-26

## 2025-05-15 ENCOUNTER — HOSPITAL ENCOUNTER (OUTPATIENT)
Dept: PAIN MANAGEMENT | Age: 45
Discharge: HOME | End: 2025-05-15
Payer: MEDICAID

## 2025-05-15 VITALS — HEIGHT: 66 IN | BODY MASS INDEX: 34.23 KG/M2 | WEIGHT: 213 LBS

## 2025-05-15 DIAGNOSIS — G57.72 COMPLEX REGIONAL PAIN SYNDROME TYPE 2 OF LEFT LOWER EXTREMITY: ICD-10-CM

## 2025-05-15 DIAGNOSIS — M25.572 CHRONIC PAIN OF LEFT ANKLE: Primary | ICD-10-CM

## 2025-05-15 DIAGNOSIS — Z98.1 HX OF ANKLE FUSION: ICD-10-CM

## 2025-05-15 DIAGNOSIS — G89.29 CHRONIC PAIN OF LEFT ANKLE: Primary | ICD-10-CM

## 2025-05-15 PROCEDURE — 99214 OFFICE O/P EST MOD 30 MIN: CPT | Performed by: ANESTHESIOLOGY

## 2025-05-15 PROCEDURE — 99215 OFFICE O/P EST HI 40 MIN: CPT

## 2025-05-15 ASSESSMENT — ENCOUNTER SYMPTOMS
EYES NEGATIVE: 1
RESPIRATORY NEGATIVE: 1
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
## 2025-06-24 DIAGNOSIS — G47.09 OTHER INSOMNIA: ICD-10-CM

## 2025-06-25 NOTE — TELEPHONE ENCOUNTER
Pharmacy requesting refill of Restoril 15mg PRN.      Medication active on med list yes      Date of last fill: 2/25/25 #30 R-2  verified on 6/25/2025   verified by VS LPN      Date of last appointment 11/8/24    Next Visit Date:  Visit date not found    OARRS report unable to be reviewed by writer.

## 2025-06-26 RX ORDER — TEMAZEPAM 15 MG/1
CAPSULE ORAL
Qty: 30 CAPSULE | Refills: 0 | Status: SHIPPED | OUTPATIENT
Start: 2025-06-26 | End: 2025-07-26

## 2025-07-23 DIAGNOSIS — G47.09 OTHER INSOMNIA: ICD-10-CM

## 2025-07-23 NOTE — TELEPHONE ENCOUNTER
Pharmacy requesting refill of Restoril 15mg .      Medication active on med list yes      Date of last fill: 6/26/25 #30 R-0  verified on 7/23/2025   verified by VS LPN      Date of last appointment 11/8/24    Next Visit Date:  Visit date not found    Number on file is not in service.

## 2025-07-25 RX ORDER — TEMAZEPAM 15 MG/1
CAPSULE ORAL
Qty: 30 CAPSULE | Refills: 0 | Status: SHIPPED | OUTPATIENT
Start: 2025-07-25 | End: 2025-08-24

## 2025-08-21 DIAGNOSIS — G47.09 OTHER INSOMNIA: ICD-10-CM

## 2025-08-22 RX ORDER — TEMAZEPAM 15 MG/1
CAPSULE ORAL
Qty: 30 CAPSULE | Refills: 0 | Status: SHIPPED | OUTPATIENT
Start: 2025-08-24 | End: 2025-09-23

## 2025-09-05 ENCOUNTER — CARE COORDINATION (OUTPATIENT)
Dept: CARE COORDINATION | Age: 45
End: 2025-09-05

## 2025-09-05 ENCOUNTER — TELEMEDICINE (OUTPATIENT)
Dept: PRIMARY CARE CLINIC | Age: 45
End: 2025-09-05

## 2025-09-05 DIAGNOSIS — F33.1 MODERATE EPISODE OF RECURRENT MAJOR DEPRESSIVE DISORDER (HCC): Primary | ICD-10-CM

## 2025-09-05 DIAGNOSIS — G89.29 CHRONIC PAIN OF LEFT ANKLE: ICD-10-CM

## 2025-09-05 DIAGNOSIS — G40.909 SEIZURE DISORDER (HCC): ICD-10-CM

## 2025-09-05 DIAGNOSIS — I31.8 PERICARDIAL MASS: ICD-10-CM

## 2025-09-05 DIAGNOSIS — L30.9 DERMATITIS: ICD-10-CM

## 2025-09-05 DIAGNOSIS — R93.5 ABNORMAL COMPUTED TOMOGRAPHY ANGIOGRAPHY (CTA) OF ABDOMEN AND PELVIS: ICD-10-CM

## 2025-09-05 DIAGNOSIS — Z78.9 NEED FOR FOLLOW-UP BY SOCIAL WORKER: ICD-10-CM

## 2025-09-05 DIAGNOSIS — M25.572 CHRONIC PAIN OF LEFT ANKLE: ICD-10-CM

## 2025-09-05 DIAGNOSIS — G57.72 COMPLEX REGIONAL PAIN SYNDROME TYPE 2 OF LEFT LOWER EXTREMITY: ICD-10-CM

## 2025-09-05 SDOH — HEALTH STABILITY: PHYSICAL HEALTH: ON AVERAGE, HOW MANY MINUTES DO YOU ENGAGE IN EXERCISE AT THIS LEVEL?: 0 MIN

## 2025-09-05 SDOH — ECONOMIC STABILITY: INCOME INSECURITY: IN THE LAST 12 MONTHS, WAS THERE A TIME WHEN YOU WERE NOT ABLE TO PAY THE MORTGAGE OR RENT ON TIME?: YES

## 2025-09-05 SDOH — HEALTH STABILITY: PHYSICAL HEALTH: ON AVERAGE, HOW MANY DAYS PER WEEK DO YOU ENGAGE IN MODERATE TO STRENUOUS EXERCISE (LIKE A BRISK WALK)?: 0 DAYS

## 2025-09-05 SDOH — ECONOMIC STABILITY: TRANSPORTATION INSECURITY
IN THE PAST 12 MONTHS, HAS THE LACK OF TRANSPORTATION KEPT YOU FROM MEDICAL APPOINTMENTS OR FROM GETTING MEDICATIONS?: YES

## 2025-09-05 SDOH — ECONOMIC STABILITY: TRANSPORTATION INSECURITY
IN THE PAST 12 MONTHS, HAS LACK OF TRANSPORTATION KEPT YOU FROM MEETINGS, WORK, OR FROM GETTING THINGS NEEDED FOR DAILY LIVING?: YES

## 2025-09-05 ASSESSMENT — SOCIAL DETERMINANTS OF HEALTH (SDOH)
IN A TYPICAL WEEK, HOW MANY TIMES DO YOU TALK ON THE PHONE WITH FAMILY, FRIENDS, OR NEIGHBORS?: MORE THAN THREE TIMES A WEEK
DO YOU BELONG TO ANY CLUBS OR ORGANIZATIONS SUCH AS CHURCH GROUPS UNIONS, FRATERNAL OR ATHLETIC GROUPS, OR SCHOOL GROUPS?: NO
HOW OFTEN DO YOU GET TOGETHER WITH FRIENDS OR RELATIVES?: MORE THAN THREE TIMES A WEEK
ARE YOU MARRIED, WIDOWED, DIVORCED, SEPARATED, NEVER MARRIED, OR LIVING WITH A PARTNER?: NEVER MARRIED
HOW OFTEN DO YOU ATTENT MEETINGS OF THE CLUB OR ORGANIZATION YOU BELONG TO?: NEVER
HOW OFTEN DO YOU ATTEND CHURCH OR RELIGIOUS SERVICES?: 1 TO 4 TIMES PER YEAR

## 2025-09-05 ASSESSMENT — ENCOUNTER SYMPTOMS
COUGH: 0
ABDOMINAL PAIN: 0
SHORTNESS OF BREATH: 0
BACK PAIN: 0

## (undated) DEVICE — 3M™ STERI-DRAPE™ INCISE DRAPE 1050 (60CM X 45CM): Brand: STERI-DRAPE™

## (undated) DEVICE — PADDING CAST W6INXL4YD POLY POR SPUN DACRON SYN VERSATILE

## (undated) DEVICE — DRAPE,UNDERBUTTOCKS,PCH,STERILE: Brand: MEDLINE

## (undated) DEVICE — CONTAINER,SPECIMEN,OR STERILE,4OZ: Brand: MEDLINE

## (undated) DEVICE — DRAPE C ARM UNIV W41XL74IN CLR PLAS XR VELC CLSR POLY STRP

## (undated) DEVICE — DRILL,  3.6 X 160MM, SOLID, MEASURING, LONG, AO: Brand: BABY GORILLA®/GORILLA® PLATING SYSTEM

## (undated) DEVICE — GOWN,SIRUS,NON REINFRCD,LARGE,SET IN SL: Brand: MEDLINE

## (undated) DEVICE — PADDING,UNDERCAST,COTTON, 4"X4YD STERILE: Brand: MEDLINE

## (undated) DEVICE — C-ARMOR C-ARM EQUIPMENT COVERS CLEAR STERILE UNIVERSAL FIT 12 PER CASE: Brand: C-ARMOR

## (undated) DEVICE — DRAPE,REIN 53X77,STERILE: Brand: MEDLINE

## (undated) DEVICE — INTENDED FOR TISSUE SEPARATION, AND OTHER PROCEDURES THAT REQUIRE A SHARP SURGICAL BLADE TO PUNCTURE OR CUT.: Brand: BARD-PARKER ® CARBON RIB-BACK BLADES

## (undated) DEVICE — DRAPE,U/ SHT,SPLIT,PLAS,STERIL: Brand: MEDLINE

## (undated) DEVICE — GLOVE SURG SZ 7 CRM LTX FREE POLYISOPRENE POLYMER BEAD ANTI

## (undated) DEVICE — SST TWIST DRILL, AO TYPE, 2MM DIA. X 75MM: Brand: MICROAIRE®

## (undated) DEVICE — THIN OFFSET (13.3 X 0.38 X 42.0MM)

## (undated) DEVICE — BLANKET WRM W29.9XL79.1IN UP BODY FORC AIR MISTRAL-AIR

## (undated) DEVICE — TOWEL,OR,DSP,ST,BLUE,DLX,XR,4/PK,20PK/CS: Brand: MEDLINE

## (undated) DEVICE — SMARTGOWN SURGICAL GOWN, 3XL, LONG: Brand: CONVERTORS

## (undated) DEVICE — COUNTERSINK, 7.0 HEADED
Type: IMPLANTABLE DEVICE | Site: ANKLE | Status: NON-FUNCTIONAL
Brand: MONSTER® SCREW SYSTEM
Removed: 2020-10-15

## (undated) DEVICE — BNDG,ELSTC,MATRIX,STRL,6"X5YD,LF,HOOK&LP: Brand: MEDLINE

## (undated) DEVICE — DRILL, 4.6 X 220MM, CANNULATED, 3/16" SQ. CONNECTION: Brand: MONSTER® SCREW SYSTEM

## (undated) DEVICE — GARMENT,MEDLINE,DVT,INT,CALF,MED, GEN2: Brand: MEDLINE

## (undated) DEVICE — Device

## (undated) DEVICE — GAUZE,SPONGE,FLUFF,6"X6.75",STRL,5/TRAY: Brand: MEDLINE

## (undated) DEVICE — 5.5MM OVAL SOLID CARBIDE BUR LONG

## (undated) DEVICE — SPONGE LAP W18XL18IN WHT COT 4 PLY FLD STRUNG RADPQ DISP ST

## (undated) DEVICE — ZIMMER® STERILE DISPOSABLE TOURNIQUET CUFF WITH PLC, DUAL PORT, SINGLE BLADDER, 42 IN. (107 CM)

## (undated) DEVICE — BONE FENESTRATION PERFORATOR: Brand: PHANTOM HINDFOOT TTC/TC NAIL SYSTEM

## (undated) DEVICE — GLOVE SURG SZ 85 L12IN FNGR THK79MIL GRN LTX FREE

## (undated) DEVICE — YANKAUER,FLEXIBLE HANDLE,REGLR CAPACITY: Brand: MEDLINE INDUSTRIES, INC.

## (undated) DEVICE — APPLICATOR MEDICATED 26 CC SOLUTION HI LT ORNG CHLORAPREP

## (undated) DEVICE — STRIP WND CLSR W1 4XL4IN POLYAMIDE MACROPOROUS NONWOVEN H2O

## (undated) DEVICE — NDL CNTR 40CT FM MAG: Brand: MEDLINE INDUSTRIES, INC.

## (undated) DEVICE — DRESSING PETRO W3XL8IN OIL EMUL N ADH GZ KNIT IMPREG CELOS

## (undated) DEVICE — STRIP SKIN CLSR W0.25XL4IN WHT SPUNBOUND FBR NYL HI ADH

## (undated) DEVICE — TUBING, SUCTION, 1/4" X 12', STRAIGHT: Brand: MEDLINE

## (undated) DEVICE — GLOVE SURG SZ 8 CRM LTX FREE POLYISOPRENE POLYMER BEAD ANTI

## (undated) DEVICE — GLOVE SURG SZ 75 CRM LTX FREE POLYISOPRENE POLYMER BEAD ANTI

## (undated) DEVICE — HALF OLIVE WIRE, LARGE OLIVE (7MM), Ø1.60MM X 9CM, SMOOTH
Type: IMPLANTABLE DEVICE | Site: ANKLE | Status: NON-FUNCTIONAL
Brand: BABY GORILLA/GORILLA PLATING SYSTEM
Removed: 2020-10-15

## (undated) DEVICE — SKIN PREP TRAY W/CHG: Brand: MEDLINE INDUSTRIES, INC.

## (undated) DEVICE — PAD,ABDOMINAL,5"X9",ST,LF,25/BX: Brand: MEDLINE INDUSTRIES, INC.

## (undated) DEVICE — DRILL,  2.8 X 160MM, SOLID, MEASURING, LONG, AO: Brand: BABY GORILLA®/GORILLA® PLATING SYSTEM

## (undated) DEVICE — GOWN,SURGICAL,AURORA,SLEEVE: Brand: MEDLINE

## (undated) DEVICE — 5.0MM ROUND SOLID CARBIDE BUR MEDIUM

## (undated) DEVICE — BANDAGE COMPR W6INXL12FT SMOOTH FOR LIMB EXSANG ESMARCH

## (undated) DEVICE — SPLINT CAST W5XL30IN GRN STRENGTH PLSTR OF PARIS FAST SET

## (undated) DEVICE — GLOVE SURG SZ 85 CRM LTX FREE POLYISOPRENE POLYMER BEAD ANTI

## (undated) DEVICE — SYRINGE IRRIG 60ML SFT PLIABLE BLB EZ TO GRP 1 HND USE W/

## (undated) DEVICE — 4.0MM ROUND SOLID CARBIDE BUR MEDIUM

## (undated) DEVICE — 4.0MM OVAL SOLID CARBIDE BUR MEDIUM

## (undated) DEVICE — SWAB CULT CLR BLU PLAS RAYON LIQ AMIES AERB ANAERB FRIC CAP

## (undated) DEVICE — 3M™ STERI-DRAPE™ U-DRAPE 1015: Brand: STERI-DRAPE™

## (undated) DEVICE — 3.0MM ROUND SOLID CARBIDE BUR MEDIUM

## (undated) DEVICE — K-WIRE, SINGLE ENDED TROCAR TIP, SMOOTH, 2.3 X 230MM
Type: IMPLANTABLE DEVICE | Site: ANKLE | Status: NON-FUNCTIONAL
Brand: MONSTER SCREW SYSTEM
Removed: 2020-10-15

## (undated) DEVICE — GLOVE SURG SZ 65 CRM LTX FREE POLYISOPRENE POLYMER BEAD ANTI